# Patient Record
Sex: MALE | Race: BLACK OR AFRICAN AMERICAN | Employment: FULL TIME | URBAN - METROPOLITAN AREA
[De-identification: names, ages, dates, MRNs, and addresses within clinical notes are randomized per-mention and may not be internally consistent; named-entity substitution may affect disease eponyms.]

---

## 2018-06-10 ENCOUNTER — APPOINTMENT (OUTPATIENT)
Dept: CT IMAGING | Age: 43
End: 2018-06-10
Attending: PHYSICIAN ASSISTANT
Payer: COMMERCIAL

## 2018-06-10 ENCOUNTER — HOSPITAL ENCOUNTER (EMERGENCY)
Age: 43
Discharge: HOME | End: 2018-06-10
Attending: EMERGENCY MEDICINE
Payer: COMMERCIAL

## 2018-06-10 VITALS
HEART RATE: 57 BPM | RESPIRATION RATE: 16 BRPM | WEIGHT: 257 LBS | DIASTOLIC BLOOD PRESSURE: 108 MMHG | SYSTOLIC BLOOD PRESSURE: 156 MMHG | OXYGEN SATURATION: 99 % | BODY MASS INDEX: 36.79 KG/M2 | HEIGHT: 70 IN | TEMPERATURE: 97.8 F

## 2018-06-10 DIAGNOSIS — R22.0 FACIAL SWELLING: Primary | ICD-10-CM

## 2018-06-10 PROCEDURE — 99283 EMERGENCY DEPT VISIT LOW MDM: CPT

## 2018-06-10 PROCEDURE — 70486 CT MAXILLOFACIAL W/O DYE: CPT

## 2018-06-10 NOTE — ED TRIAGE NOTES
Pt reports having left jaw swelling that began last night @ 2230. Wife gave pt Benadryl 25 mg PO last night with good results. Pt states he woke up this morning with increase in swelling but not as significant as last night. Pt denies dental pain or injury.

## 2018-06-10 NOTE — DISCHARGE INSTRUCTIONS
Salivary Gland Stone: Care Instructions  Your Care Instructions    Salivary glands make saliva, or spit. A salivary gland stone is a piece of hard material, usually calcium, that can form in any of the three main salivary glands in the mouth. Salivary gland stones are also called salivary duct stones. Stones form most often in the gland that releases saliva below the tongue. A stone can block saliva from flowing out of the gland. When saliva backs up behind the stone, it can make the gland swell. The gland swells while you are eating, and then the swelling goes down slowly afterward. The swelling and pain may be under the jaw or in the area in front of the ear, depending on which gland is affected. Your doctor will ask you about your symptoms. He or she may be able to see and feel the gland under your skin or in the floor of your mouth. You may get an imaging test, such as a CT scan or ultrasound. This will help your doctor know if you have a stone and not some other problem. Most stones come out into the mouth on their own. While the stone is in the gland, your doctor may have you take medicine for pain. There are also some things you can do at home to help move the stone. If the stone in your gland hasn't come out within a few weeks, your doctor will discuss treatment options with you. Follow-up care is a key part of your treatment and safety. Be sure to make and go to all appointments, and call your doctor if you are having problems. It's also a good idea to know your test results and keep a list of the medicines you take. How can you care for yourself at home? · Be safe with medicines. Read and follow all instructions on the label. ¨ If the doctor gave you a prescription medicine for pain, take it as prescribed. ¨ If you are not taking a prescription pain medicine, ask your doctor if you can take an over-the-counter medicine. · If your doctor prescribed antibiotics, take them as directed.  Do not stop taking them just because you feel better. You need to take the full course of antibiotics. · Use sugar-free gum or candies such as lemon drops, or suck on a lemon wedge. They increase saliva, which may help push the stone out. · Gently massage the affected gland to help move the stone. When should you call for help? Call your doctor now or seek immediate medical care if:  ? · You have symptoms of infection, such as:  ¨ Increased pain, swelling, warmth, or redness. ¨ Red streaks leading from the salivary gland. ¨ Pus draining from the area where the saliva comes out into the mouth. ¨ A fever. ? Watch closely for changes in your health, and be sure to contact your doctor if:  ? · You do not get better as expected. Where can you learn more? Go to http://rose mary-jennifer.info/. Enter S425 in the search box to learn more about \"Salivary Gland Stone: Care Instructions. \"  Current as of: May 12, 2017  Content Version: 11.4  © 1901-1912 [employer]. Care instructions adapted under license by [employer] (which disclaims liability or warranty for this information). If you have questions about a medical condition or this instruction, always ask your healthcare professional. [address] any warranty or liability for your use of this information.

## 2018-06-10 NOTE — ED NOTES
I have reviewed discharge instructions with the patient. The patient verbalized understanding. Pt ambulatory to exit upon discharge no acute distress noted. Education provided on the follow up care for hypertension.

## 2021-01-01 ENCOUNTER — APPOINTMENT (OUTPATIENT)
Dept: VASCULAR SURGERY | Age: 46
DRG: 870 | End: 2021-01-01
Attending: NURSE PRACTITIONER
Payer: COMMERCIAL

## 2021-01-01 ENCOUNTER — APPOINTMENT (OUTPATIENT)
Dept: GENERAL RADIOLOGY | Age: 46
DRG: 870 | End: 2021-01-01
Attending: ANESTHESIOLOGY
Payer: COMMERCIAL

## 2021-01-01 ENCOUNTER — APPOINTMENT (OUTPATIENT)
Dept: GENERAL RADIOLOGY | Age: 46
DRG: 870 | End: 2021-01-01
Attending: INTERNAL MEDICINE
Payer: COMMERCIAL

## 2021-01-01 ENCOUNTER — APPOINTMENT (OUTPATIENT)
Dept: GENERAL RADIOLOGY | Age: 46
DRG: 870 | End: 2021-01-01
Attending: STUDENT IN AN ORGANIZED HEALTH CARE EDUCATION/TRAINING PROGRAM
Payer: COMMERCIAL

## 2021-01-01 ENCOUNTER — APPOINTMENT (OUTPATIENT)
Dept: NON INVASIVE DIAGNOSTICS | Age: 46
DRG: 870 | End: 2021-01-01
Attending: HOSPITALIST
Payer: COMMERCIAL

## 2021-01-01 ENCOUNTER — APPOINTMENT (OUTPATIENT)
Dept: GENERAL RADIOLOGY | Age: 46
DRG: 870 | End: 2021-01-01
Attending: NURSE PRACTITIONER
Payer: COMMERCIAL

## 2021-01-01 ENCOUNTER — APPOINTMENT (OUTPATIENT)
Dept: GENERAL RADIOLOGY | Age: 46
DRG: 870 | End: 2021-01-01
Payer: COMMERCIAL

## 2021-01-01 ENCOUNTER — HOSPITAL ENCOUNTER (INPATIENT)
Age: 46
LOS: 23 days | DRG: 870 | End: 2021-09-06
Attending: STUDENT IN AN ORGANIZED HEALTH CARE EDUCATION/TRAINING PROGRAM | Admitting: HOSPITALIST
Payer: COMMERCIAL

## 2021-01-01 ENCOUNTER — PATIENT OUTREACH (OUTPATIENT)
Dept: CASE MANAGEMENT | Age: 46
End: 2021-01-01

## 2021-01-01 ENCOUNTER — HOSPITAL ENCOUNTER (EMERGENCY)
Age: 46
Discharge: HOME | DRG: 870 | End: 2021-08-12
Attending: EMERGENCY MEDICINE
Payer: COMMERCIAL

## 2021-01-01 ENCOUNTER — APPOINTMENT (OUTPATIENT)
Dept: CT IMAGING | Age: 46
DRG: 870 | End: 2021-01-01
Attending: STUDENT IN AN ORGANIZED HEALTH CARE EDUCATION/TRAINING PROGRAM
Payer: COMMERCIAL

## 2021-01-01 ENCOUNTER — APPOINTMENT (OUTPATIENT)
Dept: GENERAL RADIOLOGY | Age: 46
DRG: 870 | End: 2021-01-01
Attending: HOSPITALIST
Payer: COMMERCIAL

## 2021-01-01 VITALS
DIASTOLIC BLOOD PRESSURE: 72 MMHG | OXYGEN SATURATION: 96 % | TEMPERATURE: 97.3 F | SYSTOLIC BLOOD PRESSURE: 116 MMHG | HEART RATE: 94 BPM | RESPIRATION RATE: 28 BRPM

## 2021-01-01 VITALS
RESPIRATION RATE: 24 BRPM | SYSTOLIC BLOOD PRESSURE: 68 MMHG | OXYGEN SATURATION: 41 % | WEIGHT: 240.52 LBS | BODY MASS INDEX: 34.43 KG/M2 | DIASTOLIC BLOOD PRESSURE: 25 MMHG | HEIGHT: 70 IN | TEMPERATURE: 97.1 F

## 2021-01-01 DIAGNOSIS — J96.00 ACUTE RESPIRATORY FAILURE DUE TO COVID-19 (HCC): Primary | ICD-10-CM

## 2021-01-01 DIAGNOSIS — U07.1 PNEUMONIA DUE TO COVID-19 VIRUS: ICD-10-CM

## 2021-01-01 DIAGNOSIS — J06.9 ACUTE URI: ICD-10-CM

## 2021-01-01 DIAGNOSIS — U07.1 ACUTE RESPIRATORY FAILURE DUE TO COVID-19 (HCC): Primary | ICD-10-CM

## 2021-01-01 DIAGNOSIS — U07.1 COVID-19: Primary | ICD-10-CM

## 2021-01-01 DIAGNOSIS — R09.02 HYPOXIA: ICD-10-CM

## 2021-01-01 DIAGNOSIS — J12.82 PNEUMONIA DUE TO COVID-19 VIRUS: ICD-10-CM

## 2021-01-01 LAB
ALBUMIN SERPL-MCNC: 1.7 G/DL (ref 3.5–5)
ALBUMIN SERPL-MCNC: 2.2 G/DL (ref 3.5–5)
ALBUMIN SERPL-MCNC: 2.3 G/DL (ref 3.5–5)
ALBUMIN SERPL-MCNC: 2.4 G/DL (ref 3.5–5)
ALBUMIN SERPL-MCNC: 2.5 G/DL (ref 3.5–5)
ALBUMIN SERPL-MCNC: 2.5 G/DL (ref 3.5–5)
ALBUMIN SERPL-MCNC: 2.6 G/DL (ref 3.5–5)
ALBUMIN SERPL-MCNC: 2.8 G/DL (ref 3.5–5)
ALBUMIN SERPL-MCNC: 2.8 G/DL (ref 3.5–5)
ALBUMIN SERPL-MCNC: 2.9 G/DL (ref 3.5–5)
ALBUMIN SERPL-MCNC: 3.2 G/DL (ref 3.5–5)
ALBUMIN SERPL-MCNC: 3.3 G/DL (ref 3.5–5)
ALBUMIN SERPL-MCNC: 3.4 G/DL (ref 3.5–5)
ALBUMIN SERPL-MCNC: 3.5 G/DL (ref 3.5–5)
ALBUMIN SERPL-MCNC: 3.6 G/DL (ref 3.5–5)
ALBUMIN SERPL-MCNC: 3.6 G/DL (ref 3.5–5)
ALBUMIN SERPL-MCNC: 4 G/DL (ref 3.5–5)
ALBUMIN/GLOB SERPL: 0.6 {RATIO} (ref 1.1–2.2)
ALBUMIN/GLOB SERPL: 0.7 {RATIO} (ref 1.1–2.2)
ALBUMIN/GLOB SERPL: 0.8 {RATIO} (ref 1.1–2.2)
ALBUMIN/GLOB SERPL: 0.9 {RATIO} (ref 1.1–2.2)
ALBUMIN/GLOB SERPL: 0.9 {RATIO} (ref 1.1–2.2)
ALBUMIN/GLOB SERPL: 1 {RATIO} (ref 1.1–2.2)
ALBUMIN/GLOB SERPL: 1.1 {RATIO} (ref 1.1–2.2)
ALBUMIN/GLOB SERPL: 1.2 {RATIO} (ref 1.1–2.2)
ALBUMIN/GLOB SERPL: 1.2 {RATIO} (ref 1.1–2.2)
ALBUMIN/GLOB SERPL: 1.3 {RATIO} (ref 1.1–2.2)
ALP SERPL-CCNC: 102 U/L (ref 45–117)
ALP SERPL-CCNC: 112 U/L (ref 45–117)
ALP SERPL-CCNC: 117 U/L (ref 45–117)
ALP SERPL-CCNC: 126 U/L (ref 45–117)
ALP SERPL-CCNC: 136 U/L (ref 45–117)
ALP SERPL-CCNC: 151 U/L (ref 45–117)
ALP SERPL-CCNC: 66 U/L (ref 45–117)
ALP SERPL-CCNC: 68 U/L (ref 45–117)
ALP SERPL-CCNC: 69 U/L (ref 45–117)
ALP SERPL-CCNC: 70 U/L (ref 45–117)
ALP SERPL-CCNC: 75 U/L (ref 45–117)
ALP SERPL-CCNC: 76 U/L (ref 45–117)
ALP SERPL-CCNC: 77 U/L (ref 45–117)
ALP SERPL-CCNC: 78 U/L (ref 45–117)
ALP SERPL-CCNC: 80 U/L (ref 45–117)
ALP SERPL-CCNC: 80 U/L (ref 45–117)
ALP SERPL-CCNC: 81 U/L (ref 45–117)
ALP SERPL-CCNC: 82 U/L (ref 45–117)
ALP SERPL-CCNC: 84 U/L (ref 45–117)
ALP SERPL-CCNC: 88 U/L (ref 45–117)
ALT SERPL-CCNC: 101 U/L (ref 12–78)
ALT SERPL-CCNC: 101 U/L (ref 12–78)
ALT SERPL-CCNC: 107 U/L (ref 12–78)
ALT SERPL-CCNC: 109 U/L (ref 12–78)
ALT SERPL-CCNC: 124 U/L (ref 12–78)
ALT SERPL-CCNC: 125 U/L (ref 12–78)
ALT SERPL-CCNC: 128 U/L (ref 12–78)
ALT SERPL-CCNC: 132 U/L (ref 12–78)
ALT SERPL-CCNC: 133 U/L (ref 12–78)
ALT SERPL-CCNC: 139 U/L (ref 12–78)
ALT SERPL-CCNC: 142 U/L (ref 12–78)
ALT SERPL-CCNC: 160 U/L (ref 12–78)
ALT SERPL-CCNC: 234 U/L (ref 12–78)
ALT SERPL-CCNC: 58 U/L (ref 12–78)
ALT SERPL-CCNC: 60 U/L (ref 12–78)
ALT SERPL-CCNC: 67 U/L (ref 12–78)
ALT SERPL-CCNC: 72 U/L (ref 12–78)
ALT SERPL-CCNC: 74 U/L (ref 12–78)
ALT SERPL-CCNC: 75 U/L (ref 12–78)
ALT SERPL-CCNC: 77 U/L (ref 12–78)
ALT SERPL-CCNC: 83 U/L (ref 12–78)
ALT SERPL-CCNC: 99 U/L (ref 12–78)
ANION GAP SERPL CALC-SCNC: 10 MMOL/L (ref 5–15)
ANION GAP SERPL CALC-SCNC: 3 MMOL/L (ref 5–15)
ANION GAP SERPL CALC-SCNC: 4 MMOL/L (ref 5–15)
ANION GAP SERPL CALC-SCNC: 5 MMOL/L (ref 5–15)
ANION GAP SERPL CALC-SCNC: 6 MMOL/L (ref 5–15)
ANION GAP SERPL CALC-SCNC: 7 MMOL/L (ref 5–15)
ANION GAP SERPL CALC-SCNC: 8 MMOL/L (ref 5–15)
ANION GAP SERPL CALC-SCNC: ABNORMAL MMOL/L (ref 5–15)
APTT PPP: 28.5 SEC (ref 22.1–31)
ARTERIAL PATENCY WRIST A: ABNORMAL
ARTERIAL PATENCY WRIST A: ABNORMAL
ARTERIAL PATENCY WRIST A: NO
ARTERIAL PATENCY WRIST A: POSITIVE
ARTERIAL PATENCY WRIST A: YES
AST SERPL-CCNC: 107 U/L (ref 15–37)
AST SERPL-CCNC: 136 U/L (ref 15–37)
AST SERPL-CCNC: 170 U/L (ref 15–37)
AST SERPL-CCNC: 174 U/L (ref 15–37)
AST SERPL-CCNC: 18 U/L (ref 15–37)
AST SERPL-CCNC: 20 U/L (ref 15–37)
AST SERPL-CCNC: 20 U/L (ref 15–37)
AST SERPL-CCNC: 21 U/L (ref 15–37)
AST SERPL-CCNC: 23 U/L (ref 15–37)
AST SERPL-CCNC: 23 U/L (ref 15–37)
AST SERPL-CCNC: 242 U/L (ref 15–37)
AST SERPL-CCNC: 265 U/L (ref 15–37)
AST SERPL-CCNC: 27 U/L (ref 15–37)
AST SERPL-CCNC: 28 U/L (ref 15–37)
AST SERPL-CCNC: 306 U/L (ref 15–37)
AST SERPL-CCNC: 31 U/L (ref 15–37)
AST SERPL-CCNC: 31 U/L (ref 15–37)
AST SERPL-CCNC: 40 U/L (ref 15–37)
AST SERPL-CCNC: 57 U/L (ref 15–37)
AST SERPL-CCNC: 65 U/L (ref 15–37)
AST SERPL-CCNC: 76 U/L (ref 15–37)
AST SERPL-CCNC: 77 U/L (ref 15–37)
ATRIAL RATE: 104 BPM
BACTERIA SPEC CULT: NORMAL
BASE DEFICIT BLD-SCNC: 1 MMOL/L
BASE DEFICIT BLD-SCNC: 1.1 MMOL/L
BASE DEFICIT BLD-SCNC: 3.4 MMOL/L
BASE DEFICIT BLDA-SCNC: 0.3 MMOL/L
BASE DEFICIT BLDA-SCNC: 1.5 MMOL/L
BASE DEFICIT BLDA-SCNC: 1.5 MMOL/L
BASE DEFICIT BLDA-SCNC: 2.5 MMOL/L
BASE DEFICIT BLDA-SCNC: 2.5 MMOL/L
BASE DEFICIT BLDA-SCNC: 3.1 MMOL/L
BASE DEFICIT BLDA-SCNC: 3.5 MMOL/L
BASE DEFICIT BLDA-SCNC: 3.9 MMOL/L
BASE DEFICIT BLDA-SCNC: 9 MMOL/L
BASE EXCESS BLD CALC-SCNC: 1.2 MMOL/L
BASE EXCESS BLD CALC-SCNC: 1.2 MMOL/L
BASE EXCESS BLD CALC-SCNC: 1.8 MMOL/L
BASE EXCESS BLD CALC-SCNC: 2.1 MMOL/L
BASE EXCESS BLDA CALC-SCNC: 0 MMOL/L
BASE EXCESS BLDA CALC-SCNC: 0.2 MMOL/L
BASE EXCESS BLDA CALC-SCNC: 0.3 MMOL/L
BASE EXCESS BLDA CALC-SCNC: 0.6 MMOL/L
BASE EXCESS BLDA CALC-SCNC: 0.9 MMOL/L
BASE EXCESS BLDA CALC-SCNC: 1.1 MMOL/L
BASE EXCESS BLDA CALC-SCNC: 2.3 MMOL/L
BASOPHILS # BLD: 0 K/UL (ref 0–0.1)
BASOPHILS NFR BLD: 0 % (ref 0–1)
BDY SITE: ABNORMAL
BDY SITE: NORMAL
BILIRUB SERPL-MCNC: 0.4 MG/DL (ref 0.2–1)
BILIRUB SERPL-MCNC: 0.5 MG/DL (ref 0.2–1)
BILIRUB SERPL-MCNC: 0.6 MG/DL (ref 0.2–1)
BILIRUB SERPL-MCNC: 0.6 MG/DL (ref 0.2–1)
BILIRUB SERPL-MCNC: 0.7 MG/DL (ref 0.2–1)
BILIRUB SERPL-MCNC: 0.7 MG/DL (ref 0.2–1)
BILIRUB SERPL-MCNC: 0.8 MG/DL (ref 0.2–1)
BILIRUB SERPL-MCNC: 0.9 MG/DL (ref 0.2–1)
BILIRUB SERPL-MCNC: 0.9 MG/DL (ref 0.2–1)
BILIRUB SERPL-MCNC: 1 MG/DL (ref 0.2–1)
BILIRUB SERPL-MCNC: 1 MG/DL (ref 0.2–1)
BILIRUB SERPL-MCNC: 1.2 MG/DL (ref 0.2–1)
BNP SERPL-MCNC: 58 PG/ML
BODY TEMPERATURE: 37
BODY TEMPERATURE: 37
BUN SERPL-MCNC: 22 MG/DL (ref 6–20)
BUN SERPL-MCNC: 23 MG/DL (ref 6–20)
BUN SERPL-MCNC: 23 MG/DL (ref 6–20)
BUN SERPL-MCNC: 24 MG/DL (ref 6–20)
BUN SERPL-MCNC: 24 MG/DL (ref 6–20)
BUN SERPL-MCNC: 25 MG/DL (ref 6–20)
BUN SERPL-MCNC: 26 MG/DL (ref 6–20)
BUN SERPL-MCNC: 27 MG/DL (ref 6–20)
BUN SERPL-MCNC: 27 MG/DL (ref 6–20)
BUN SERPL-MCNC: 29 MG/DL (ref 6–20)
BUN SERPL-MCNC: 29 MG/DL (ref 6–20)
BUN SERPL-MCNC: 31 MG/DL (ref 6–20)
BUN SERPL-MCNC: 31 MG/DL (ref 6–20)
BUN SERPL-MCNC: 38 MG/DL (ref 6–20)
BUN SERPL-MCNC: 42 MG/DL (ref 6–20)
BUN SERPL-MCNC: 47 MG/DL (ref 6–20)
BUN SERPL-MCNC: 51 MG/DL (ref 6–20)
BUN SERPL-MCNC: 54 MG/DL (ref 6–20)
BUN SERPL-MCNC: 61 MG/DL (ref 6–20)
BUN SERPL-MCNC: 62 MG/DL (ref 6–20)
BUN SERPL-MCNC: 65 MG/DL (ref 6–20)
BUN SERPL-MCNC: 73 MG/DL (ref 6–20)
BUN SERPL-MCNC: 73 MG/DL (ref 6–20)
BUN SERPL-MCNC: 80 MG/DL (ref 6–20)
BUN SERPL-MCNC: 82 MG/DL (ref 6–20)
BUN SERPL-MCNC: 83 MG/DL (ref 6–20)
BUN SERPL-MCNC: 84 MG/DL (ref 6–20)
BUN SERPL-MCNC: 85 MG/DL (ref 6–20)
BUN SERPL-MCNC: 85 MG/DL (ref 6–20)
BUN SERPL-MCNC: 88 MG/DL (ref 6–20)
BUN SERPL-MCNC: 89 MG/DL (ref 6–20)
BUN SERPL-MCNC: 92 MG/DL (ref 6–20)
BUN SERPL-MCNC: 97 MG/DL (ref 6–20)
BUN/CREAT SERPL: 12 (ref 12–20)
BUN/CREAT SERPL: 19 (ref 12–20)
BUN/CREAT SERPL: 19 (ref 12–20)
BUN/CREAT SERPL: 20 (ref 12–20)
BUN/CREAT SERPL: 21 (ref 12–20)
BUN/CREAT SERPL: 22 (ref 12–20)
BUN/CREAT SERPL: 22 (ref 12–20)
BUN/CREAT SERPL: 23 (ref 12–20)
BUN/CREAT SERPL: 24 (ref 12–20)
BUN/CREAT SERPL: 25 (ref 12–20)
BUN/CREAT SERPL: 25 (ref 12–20)
BUN/CREAT SERPL: 26 (ref 12–20)
BUN/CREAT SERPL: 26 (ref 12–20)
BUN/CREAT SERPL: 27 (ref 12–20)
BUN/CREAT SERPL: 29 (ref 12–20)
BUN/CREAT SERPL: 29 (ref 12–20)
BUN/CREAT SERPL: 30 (ref 12–20)
BUN/CREAT SERPL: 32 (ref 12–20)
BUN/CREAT SERPL: 35 (ref 12–20)
BUN/CREAT SERPL: 39 (ref 12–20)
BUN/CREAT SERPL: 41 (ref 12–20)
BUN/CREAT SERPL: 42 (ref 12–20)
BUN/CREAT SERPL: 42 (ref 12–20)
BUN/CREAT SERPL: 43 (ref 12–20)
BUN/CREAT SERPL: 43 (ref 12–20)
BUN/CREAT SERPL: 44 (ref 12–20)
BUN/CREAT SERPL: 44 (ref 12–20)
CA-I BLD-SCNC: 1.14 MMOL/L (ref 1.12–1.32)
CA-I BLD-SCNC: 1.22 MMOL/L (ref 1.13–1.32)
CA-I BLD-SCNC: 1.28 MMOL/L (ref 1.12–1.32)
CALCIUM SERPL-MCNC: 6.4 MG/DL (ref 8.5–10.1)
CALCIUM SERPL-MCNC: 7.4 MG/DL (ref 8.5–10.1)
CALCIUM SERPL-MCNC: 7.8 MG/DL (ref 8.5–10.1)
CALCIUM SERPL-MCNC: 8 MG/DL (ref 8.5–10.1)
CALCIUM SERPL-MCNC: 8.1 MG/DL (ref 8.5–10.1)
CALCIUM SERPL-MCNC: 8.2 MG/DL (ref 8.5–10.1)
CALCIUM SERPL-MCNC: 8.2 MG/DL (ref 8.5–10.1)
CALCIUM SERPL-MCNC: 8.3 MG/DL (ref 8.5–10.1)
CALCIUM SERPL-MCNC: 8.4 MG/DL (ref 8.5–10.1)
CALCIUM SERPL-MCNC: 8.5 MG/DL (ref 8.5–10.1)
CALCIUM SERPL-MCNC: 8.5 MG/DL (ref 8.5–10.1)
CALCIUM SERPL-MCNC: 8.6 MG/DL (ref 8.5–10.1)
CALCIUM SERPL-MCNC: 8.7 MG/DL (ref 8.5–10.1)
CALCIUM SERPL-MCNC: 8.8 MG/DL (ref 8.5–10.1)
CALCIUM SERPL-MCNC: 8.8 MG/DL (ref 8.5–10.1)
CALCIUM SERPL-MCNC: 9.1 MG/DL (ref 8.5–10.1)
CALCULATED P AXIS, ECG09: 21 DEGREES
CALCULATED R AXIS, ECG10: -33 DEGREES
CALCULATED T AXIS, ECG11: 28 DEGREES
CHLORIDE SERPL-SCNC: 101 MMOL/L (ref 97–108)
CHLORIDE SERPL-SCNC: 102 MMOL/L (ref 97–108)
CHLORIDE SERPL-SCNC: 104 MMOL/L (ref 97–108)
CHLORIDE SERPL-SCNC: 105 MMOL/L (ref 97–108)
CHLORIDE SERPL-SCNC: 105 MMOL/L (ref 97–108)
CHLORIDE SERPL-SCNC: 106 MMOL/L (ref 97–108)
CHLORIDE SERPL-SCNC: 107 MMOL/L (ref 97–108)
CHLORIDE SERPL-SCNC: 107 MMOL/L (ref 97–108)
CHLORIDE SERPL-SCNC: 108 MMOL/L (ref 97–108)
CHLORIDE SERPL-SCNC: 108 MMOL/L (ref 97–108)
CHLORIDE SERPL-SCNC: 109 MMOL/L (ref 97–108)
CHLORIDE SERPL-SCNC: 110 MMOL/L (ref 97–108)
CHLORIDE SERPL-SCNC: 111 MMOL/L (ref 97–108)
CHLORIDE SERPL-SCNC: 112 MMOL/L (ref 97–108)
CHLORIDE SERPL-SCNC: 112 MMOL/L (ref 97–108)
CHLORIDE SERPL-SCNC: 115 MMOL/L (ref 97–108)
CHLORIDE SERPL-SCNC: 99 MMOL/L (ref 97–108)
CHLORIDE SERPL-SCNC: 99 MMOL/L (ref 97–108)
CHLORIDE UR-SCNC: 55 MMOL/L
CK SERPL-CCNC: 187 U/L (ref 39–308)
CO2 SERPL-SCNC: 20 MMOL/L (ref 21–32)
CO2 SERPL-SCNC: 20 MMOL/L (ref 21–32)
CO2 SERPL-SCNC: 21 MMOL/L (ref 21–32)
CO2 SERPL-SCNC: 22 MMOL/L (ref 21–32)
CO2 SERPL-SCNC: 22 MMOL/L (ref 21–32)
CO2 SERPL-SCNC: 23 MMOL/L (ref 21–32)
CO2 SERPL-SCNC: 24 MMOL/L (ref 21–32)
CO2 SERPL-SCNC: 24 MMOL/L (ref 21–32)
CO2 SERPL-SCNC: 25 MMOL/L (ref 21–32)
CO2 SERPL-SCNC: 25 MMOL/L (ref 21–32)
CO2 SERPL-SCNC: 26 MMOL/L (ref 21–32)
CO2 SERPL-SCNC: 27 MMOL/L (ref 21–32)
CO2 SERPL-SCNC: 28 MMOL/L (ref 21–32)
CO2 SERPL-SCNC: 28 MMOL/L (ref 21–32)
CO2 SERPL-SCNC: 29 MMOL/L (ref 21–32)
CO2 SERPL-SCNC: 31 MMOL/L (ref 21–32)
COHGB MFR BLD: 0.3 % (ref 1–2)
COHGB MFR BLD: 0.4 % (ref 1–2)
COHGB MFR BLD: 0.5 % (ref 1–2)
COHGB MFR BLD: 0.8 % (ref 1–2)
COHGB MFR BLD: 0.8 % (ref 1–2)
COMMENT, HOLDF: NORMAL
COVID-19 RAPID TEST, COVR: DETECTED
CREAT SERPL-MCNC: 0.74 MG/DL (ref 0.7–1.3)
CREAT SERPL-MCNC: 0.89 MG/DL (ref 0.7–1.3)
CREAT SERPL-MCNC: 0.94 MG/DL (ref 0.7–1.3)
CREAT SERPL-MCNC: 0.98 MG/DL (ref 0.7–1.3)
CREAT SERPL-MCNC: 0.99 MG/DL (ref 0.7–1.3)
CREAT SERPL-MCNC: 0.99 MG/DL (ref 0.7–1.3)
CREAT SERPL-MCNC: 1.02 MG/DL (ref 0.7–1.3)
CREAT SERPL-MCNC: 1.04 MG/DL (ref 0.7–1.3)
CREAT SERPL-MCNC: 1.08 MG/DL (ref 0.7–1.3)
CREAT SERPL-MCNC: 1.1 MG/DL (ref 0.7–1.3)
CREAT SERPL-MCNC: 1.13 MG/DL (ref 0.7–1.3)
CREAT SERPL-MCNC: 1.15 MG/DL (ref 0.7–1.3)
CREAT SERPL-MCNC: 1.16 MG/DL (ref 0.7–1.3)
CREAT SERPL-MCNC: 1.3 MG/DL (ref 0.7–1.3)
CREAT SERPL-MCNC: 1.8 MG/DL (ref 0.7–1.3)
CREAT SERPL-MCNC: 1.85 MG/DL (ref 0.7–1.3)
CREAT SERPL-MCNC: 1.89 MG/DL (ref 0.7–1.3)
CREAT SERPL-MCNC: 1.91 MG/DL (ref 0.7–1.3)
CREAT SERPL-MCNC: 1.94 MG/DL (ref 0.7–1.3)
CREAT SERPL-MCNC: 1.94 MG/DL (ref 0.7–1.3)
CREAT SERPL-MCNC: 2.01 MG/DL (ref 0.7–1.3)
CREAT SERPL-MCNC: 2.03 MG/DL (ref 0.7–1.3)
CREAT SERPL-MCNC: 2.07 MG/DL (ref 0.7–1.3)
CREAT SERPL-MCNC: 2.1 MG/DL (ref 0.7–1.3)
CREAT SERPL-MCNC: 2.16 MG/DL (ref 0.7–1.3)
CREAT SERPL-MCNC: 2.35 MG/DL (ref 0.7–1.3)
CREAT SERPL-MCNC: 2.41 MG/DL (ref 0.7–1.3)
CREAT SERPL-MCNC: 2.41 MG/DL (ref 0.7–1.3)
CREAT SERPL-MCNC: 2.52 MG/DL (ref 0.7–1.3)
CREAT SERPL-MCNC: 2.54 MG/DL (ref 0.7–1.3)
CREAT SERPL-MCNC: 2.56 MG/DL (ref 0.7–1.3)
CREAT SERPL-MCNC: 2.6 MG/DL (ref 0.7–1.3)
CREAT SERPL-MCNC: 2.64 MG/DL (ref 0.7–1.3)
CREAT SERPL-MCNC: 2.67 MG/DL (ref 0.7–1.3)
CREAT SERPL-MCNC: 4.3 MG/DL (ref 0.7–1.3)
CREAT UR-MCNC: 83.3 MG/DL
CRP SERPL-MCNC: 0.34 MG/DL (ref 0–0.6)
CRP SERPL-MCNC: 0.59 MG/DL (ref 0–0.6)
CRP SERPL-MCNC: 1.02 MG/DL (ref 0–0.6)
CRP SERPL-MCNC: 1.39 MG/DL (ref 0–0.6)
CRP SERPL-MCNC: 10.3 MG/DL (ref 0–0.6)
CRP SERPL-MCNC: 4.1 MG/DL (ref 0–0.6)
CRP SERPL-MCNC: 8.3 MG/DL (ref 0–0.6)
CRP SERPL-MCNC: <0.29 MG/DL (ref 0–0.6)
D DIMER PPP FEU-MCNC: 0.36 MG/L FEU (ref 0–0.65)
D DIMER PPP FEU-MCNC: 0.5 MG/L FEU (ref 0–0.65)
D DIMER PPP FEU-MCNC: 0.53 MG/L FEU (ref 0–0.65)
D DIMER PPP FEU-MCNC: 0.57 MG/L FEU (ref 0–0.65)
D DIMER PPP FEU-MCNC: 11.14 MG/L FEU (ref 0–0.65)
D DIMER PPP FEU-MCNC: 14.3 MG/L FEU (ref 0–0.65)
D DIMER PPP FEU-MCNC: 3.96 MG/L FEU (ref 0–0.65)
D DIMER PPP FEU-MCNC: 4.67 MG/L FEU (ref 0–0.65)
D DIMER PPP FEU-MCNC: 8.53 MG/L FEU (ref 0–0.65)
DIAGNOSIS, 93000: NORMAL
DIFFERENTIAL METHOD BLD: ABNORMAL
ECHO AO ROOT DIAM: 2.58 CM
ECHO AV AREA PEAK VELOCITY: 3.46 CM2
ECHO AV AREA/BSA PEAK VELOCITY: 1.6 CM2/M2
ECHO AV PEAK GRADIENT: 11.14 MMHG
ECHO AV PEAK VELOCITY: 166.9 CM/S
ECHO LA AREA 4C: 18.1 CM2
ECHO LA MAJOR AXIS: 4.58 CM
ECHO LA MINOR AXIS: 2.05 CM
ECHO LA VOL 2C: 50.06 ML (ref 18–58)
ECHO LA VOL 4C: 43.9 ML (ref 18–58)
ECHO LA VOL BP: 51.88 ML (ref 18–58)
ECHO LA VOL/BSA BIPLANE: 23.26 ML/M2 (ref 16–28)
ECHO LA VOLUME INDEX A2C: 22.45 ML/M2 (ref 16–28)
ECHO LA VOLUME INDEX A4C: 19.69 ML/M2 (ref 16–28)
ECHO LV INTERNAL DIMENSION DIASTOLIC: 4.42 CM (ref 4.2–5.9)
ECHO LV INTERNAL DIMENSION SYSTOLIC: 3.03 CM
ECHO LV IVSD: 1.34 CM (ref 0.6–1)
ECHO LV MASS 2D: 230.3 G (ref 88–224)
ECHO LV MASS INDEX 2D: 103.3 G/M2 (ref 49–115)
ECHO LV POSTERIOR WALL DIASTOLIC: 1.37 CM (ref 0.6–1)
ECHO LVOT DIAM: 2.15 CM
ECHO LVOT PEAK GRADIENT: 10.2 MMHG
ECHO LVOT PEAK VELOCITY: 159.65 CM/S
ECHO PV MAX VELOCITY: 134.56 CM/S
ECHO PV PEAK INSTANTANEOUS GRADIENT SYSTOLIC: 7.24 MMHG
ECHO PV REGURGITANT MAX VELOCITY: 139.09 CM/S
ECHO RV TAPSE: 2.88 CM (ref 1.5–2)
EOSINOPHIL # BLD: 0 K/UL (ref 0–0.4)
EOSINOPHIL #/AREA URNS HPF: NEGATIVE /[HPF]
EOSINOPHIL NFR BLD: 0 % (ref 0–7)
ERYTHROCYTE [DISTWIDTH] IN BLOOD BY AUTOMATED COUNT: 12.7 % (ref 11.5–14.5)
ERYTHROCYTE [DISTWIDTH] IN BLOOD BY AUTOMATED COUNT: 12.7 % (ref 11.5–14.5)
ERYTHROCYTE [DISTWIDTH] IN BLOOD BY AUTOMATED COUNT: 12.8 % (ref 11.5–14.5)
ERYTHROCYTE [DISTWIDTH] IN BLOOD BY AUTOMATED COUNT: 13 % (ref 11.5–14.5)
ERYTHROCYTE [DISTWIDTH] IN BLOOD BY AUTOMATED COUNT: 13.2 % (ref 11.5–14.5)
ERYTHROCYTE [DISTWIDTH] IN BLOOD BY AUTOMATED COUNT: 13.3 % (ref 11.5–14.5)
ERYTHROCYTE [DISTWIDTH] IN BLOOD BY AUTOMATED COUNT: 13.5 % (ref 11.5–14.5)
ERYTHROCYTE [DISTWIDTH] IN BLOOD BY AUTOMATED COUNT: 13.7 % (ref 11.5–14.5)
ERYTHROCYTE [DISTWIDTH] IN BLOOD BY AUTOMATED COUNT: 13.7 % (ref 11.5–14.5)
ERYTHROCYTE [DISTWIDTH] IN BLOOD BY AUTOMATED COUNT: 13.9 % (ref 11.5–14.5)
ERYTHROCYTE [DISTWIDTH] IN BLOOD BY AUTOMATED COUNT: 14.1 % (ref 11.5–14.5)
ERYTHROCYTE [DISTWIDTH] IN BLOOD BY AUTOMATED COUNT: 14.1 % (ref 11.5–14.5)
ERYTHROCYTE [DISTWIDTH] IN BLOOD BY AUTOMATED COUNT: 14.2 % (ref 11.5–14.5)
ERYTHROCYTE [DISTWIDTH] IN BLOOD BY AUTOMATED COUNT: 14.4 % (ref 11.5–14.5)
ERYTHROCYTE [DISTWIDTH] IN BLOOD BY AUTOMATED COUNT: 14.5 % (ref 11.5–14.5)
ERYTHROCYTE [DISTWIDTH] IN BLOOD BY AUTOMATED COUNT: 14.6 % (ref 11.5–14.5)
ERYTHROCYTE [DISTWIDTH] IN BLOOD BY AUTOMATED COUNT: 14.6 % (ref 11.5–14.5)
FERRITIN SERPL-MCNC: 2783 NG/ML (ref 26–388)
FIBRINOGEN PPP-MCNC: 434 MG/DL (ref 200–475)
FIBRINOGEN PPP-MCNC: 95 MG/DL (ref 200–475)
FIO2 ON VENT: 100 %
FLUID CULTURE, SPNG2: NORMAL
GAS FLOW.O2 O2 DELIVERY SYS: ABNORMAL L/MIN
GAS FLOW.O2 SETTING OXYMISER: 18 BPM
GAS FLOW.O2 SETTING OXYMISER: 20 L/MIN
GAS FLOW.O2 SETTING OXYMISER: 24 L/MIN
GAS FLOW.O2 SETTING OXYMISER: 24 L/MIN
GAS FLOW.O2 SETTING OXYMISER: 27 BPM
GAS FLOW.O2 SETTING OXYMISER: 28 L/MIN
GAS FLOW.O2 SETTING OXYMISER: 30 BPM
GAS FLOW.O2 SETTING OXYMISER: 30 L/MIN
GAS FLOW.O2 SETTING OXYMISER: 32 L/MIN
GLOBULIN SER CALC-MCNC: 2.6 G/DL (ref 2–4)
GLOBULIN SER CALC-MCNC: 2.9 G/DL (ref 2–4)
GLOBULIN SER CALC-MCNC: 3.1 G/DL (ref 2–4)
GLOBULIN SER CALC-MCNC: 3.2 G/DL (ref 2–4)
GLOBULIN SER CALC-MCNC: 3.3 G/DL (ref 2–4)
GLOBULIN SER CALC-MCNC: 3.6 G/DL (ref 2–4)
GLOBULIN SER CALC-MCNC: 3.6 G/DL (ref 2–4)
GLOBULIN SER CALC-MCNC: 3.8 G/DL (ref 2–4)
GLOBULIN SER CALC-MCNC: 3.8 G/DL (ref 2–4)
GLOBULIN SER CALC-MCNC: 4 G/DL (ref 2–4)
GLOBULIN SER CALC-MCNC: 4.1 G/DL (ref 2–4)
GLUCOSE BLD STRIP.AUTO-MCNC: 127 MG/DL (ref 65–117)
GLUCOSE BLD STRIP.AUTO-MCNC: 128 MG/DL (ref 65–117)
GLUCOSE BLD STRIP.AUTO-MCNC: 130 MG/DL (ref 65–117)
GLUCOSE BLD STRIP.AUTO-MCNC: 131 MG/DL (ref 65–117)
GLUCOSE BLD STRIP.AUTO-MCNC: 132 MG/DL (ref 65–117)
GLUCOSE BLD STRIP.AUTO-MCNC: 135 MG/DL (ref 65–117)
GLUCOSE BLD STRIP.AUTO-MCNC: 136 MG/DL (ref 65–117)
GLUCOSE BLD STRIP.AUTO-MCNC: 139 MG/DL (ref 65–117)
GLUCOSE BLD STRIP.AUTO-MCNC: 140 MG/DL (ref 65–117)
GLUCOSE BLD STRIP.AUTO-MCNC: 143 MG/DL (ref 65–117)
GLUCOSE BLD STRIP.AUTO-MCNC: 147 MG/DL (ref 65–117)
GLUCOSE BLD STRIP.AUTO-MCNC: 148 MG/DL (ref 65–117)
GLUCOSE BLD STRIP.AUTO-MCNC: 149 MG/DL (ref 65–117)
GLUCOSE BLD STRIP.AUTO-MCNC: 149 MG/DL (ref 65–117)
GLUCOSE BLD STRIP.AUTO-MCNC: 152 MG/DL (ref 65–117)
GLUCOSE BLD STRIP.AUTO-MCNC: 155 MG/DL (ref 65–117)
GLUCOSE BLD STRIP.AUTO-MCNC: 156 MG/DL (ref 65–117)
GLUCOSE BLD STRIP.AUTO-MCNC: 156 MG/DL (ref 65–117)
GLUCOSE BLD STRIP.AUTO-MCNC: 157 MG/DL (ref 65–117)
GLUCOSE BLD STRIP.AUTO-MCNC: 158 MG/DL (ref 65–117)
GLUCOSE BLD STRIP.AUTO-MCNC: 158 MG/DL (ref 65–117)
GLUCOSE BLD STRIP.AUTO-MCNC: 159 MG/DL (ref 65–117)
GLUCOSE BLD STRIP.AUTO-MCNC: 165 MG/DL (ref 65–117)
GLUCOSE BLD STRIP.AUTO-MCNC: 166 MG/DL (ref 65–117)
GLUCOSE BLD STRIP.AUTO-MCNC: 167 MG/DL (ref 65–117)
GLUCOSE BLD STRIP.AUTO-MCNC: 168 MG/DL (ref 65–117)
GLUCOSE BLD STRIP.AUTO-MCNC: 170 MG/DL (ref 65–117)
GLUCOSE BLD STRIP.AUTO-MCNC: 171 MG/DL (ref 65–117)
GLUCOSE BLD STRIP.AUTO-MCNC: 173 MG/DL (ref 65–117)
GLUCOSE BLD STRIP.AUTO-MCNC: 174 MG/DL (ref 65–117)
GLUCOSE BLD STRIP.AUTO-MCNC: 177 MG/DL (ref 65–117)
GLUCOSE BLD STRIP.AUTO-MCNC: 178 MG/DL (ref 65–117)
GLUCOSE BLD STRIP.AUTO-MCNC: 179 MG/DL (ref 65–117)
GLUCOSE BLD STRIP.AUTO-MCNC: 179 MG/DL (ref 65–117)
GLUCOSE BLD STRIP.AUTO-MCNC: 180 MG/DL (ref 65–117)
GLUCOSE BLD STRIP.AUTO-MCNC: 180 MG/DL (ref 65–117)
GLUCOSE BLD STRIP.AUTO-MCNC: 181 MG/DL (ref 65–117)
GLUCOSE BLD STRIP.AUTO-MCNC: 181 MG/DL (ref 65–117)
GLUCOSE BLD STRIP.AUTO-MCNC: 182 MG/DL (ref 65–117)
GLUCOSE BLD STRIP.AUTO-MCNC: 185 MG/DL (ref 65–117)
GLUCOSE BLD STRIP.AUTO-MCNC: 186 MG/DL (ref 65–117)
GLUCOSE BLD STRIP.AUTO-MCNC: 188 MG/DL (ref 65–117)
GLUCOSE BLD STRIP.AUTO-MCNC: 190 MG/DL (ref 65–117)
GLUCOSE BLD STRIP.AUTO-MCNC: 190 MG/DL (ref 65–117)
GLUCOSE BLD STRIP.AUTO-MCNC: 191 MG/DL (ref 65–117)
GLUCOSE BLD STRIP.AUTO-MCNC: 191 MG/DL (ref 65–117)
GLUCOSE BLD STRIP.AUTO-MCNC: 192 MG/DL (ref 65–117)
GLUCOSE BLD STRIP.AUTO-MCNC: 193 MG/DL (ref 65–117)
GLUCOSE BLD STRIP.AUTO-MCNC: 201 MG/DL (ref 65–117)
GLUCOSE BLD STRIP.AUTO-MCNC: 201 MG/DL (ref 65–117)
GLUCOSE BLD STRIP.AUTO-MCNC: 202 MG/DL (ref 65–117)
GLUCOSE BLD STRIP.AUTO-MCNC: 204 MG/DL (ref 65–117)
GLUCOSE BLD STRIP.AUTO-MCNC: 204 MG/DL (ref 65–117)
GLUCOSE BLD STRIP.AUTO-MCNC: 209 MG/DL (ref 65–117)
GLUCOSE BLD STRIP.AUTO-MCNC: 211 MG/DL (ref 65–117)
GLUCOSE BLD STRIP.AUTO-MCNC: 212 MG/DL (ref 65–117)
GLUCOSE BLD STRIP.AUTO-MCNC: 214 MG/DL (ref 65–117)
GLUCOSE BLD STRIP.AUTO-MCNC: 216 MG/DL (ref 65–117)
GLUCOSE BLD STRIP.AUTO-MCNC: 217 MG/DL (ref 65–117)
GLUCOSE BLD STRIP.AUTO-MCNC: 220 MG/DL (ref 65–117)
GLUCOSE BLD STRIP.AUTO-MCNC: 224 MG/DL (ref 65–117)
GLUCOSE BLD STRIP.AUTO-MCNC: 237 MG/DL (ref 65–117)
GLUCOSE BLD STRIP.AUTO-MCNC: 250 MG/DL (ref 65–117)
GLUCOSE BLD STRIP.AUTO-MCNC: 283 MG/DL (ref 65–117)
GLUCOSE SERPL-MCNC: 106 MG/DL (ref 65–100)
GLUCOSE SERPL-MCNC: 110 MG/DL (ref 65–100)
GLUCOSE SERPL-MCNC: 120 MG/DL (ref 65–100)
GLUCOSE SERPL-MCNC: 121 MG/DL (ref 65–100)
GLUCOSE SERPL-MCNC: 121 MG/DL (ref 65–100)
GLUCOSE SERPL-MCNC: 125 MG/DL (ref 65–100)
GLUCOSE SERPL-MCNC: 132 MG/DL (ref 65–100)
GLUCOSE SERPL-MCNC: 139 MG/DL (ref 65–100)
GLUCOSE SERPL-MCNC: 147 MG/DL (ref 65–100)
GLUCOSE SERPL-MCNC: 149 MG/DL (ref 65–100)
GLUCOSE SERPL-MCNC: 156 MG/DL (ref 65–100)
GLUCOSE SERPL-MCNC: 163 MG/DL (ref 65–100)
GLUCOSE SERPL-MCNC: 166 MG/DL (ref 65–100)
GLUCOSE SERPL-MCNC: 175 MG/DL (ref 65–100)
GLUCOSE SERPL-MCNC: 177 MG/DL (ref 65–100)
GLUCOSE SERPL-MCNC: 178 MG/DL (ref 65–100)
GLUCOSE SERPL-MCNC: 183 MG/DL (ref 65–100)
GLUCOSE SERPL-MCNC: 183 MG/DL (ref 65–100)
GLUCOSE SERPL-MCNC: 186 MG/DL (ref 65–100)
GLUCOSE SERPL-MCNC: 188 MG/DL (ref 65–100)
GLUCOSE SERPL-MCNC: 189 MG/DL (ref 65–100)
GLUCOSE SERPL-MCNC: 192 MG/DL (ref 65–100)
GLUCOSE SERPL-MCNC: 194 MG/DL (ref 65–100)
GLUCOSE SERPL-MCNC: 195 MG/DL (ref 65–100)
GLUCOSE SERPL-MCNC: 198 MG/DL (ref 65–100)
GLUCOSE SERPL-MCNC: 201 MG/DL (ref 65–100)
GLUCOSE SERPL-MCNC: 203 MG/DL (ref 65–100)
GLUCOSE SERPL-MCNC: 207 MG/DL (ref 65–100)
GLUCOSE SERPL-MCNC: 208 MG/DL (ref 65–100)
GLUCOSE SERPL-MCNC: 212 MG/DL (ref 65–100)
GLUCOSE SERPL-MCNC: 215 MG/DL (ref 65–100)
GLUCOSE SERPL-MCNC: 219 MG/DL (ref 65–100)
GLUCOSE SERPL-MCNC: 231 MG/DL (ref 65–100)
GLUCOSE SERPL-MCNC: 236 MG/DL (ref 65–100)
GLUCOSE SERPL-MCNC: 242 MG/DL (ref 65–100)
GRAM STN SPEC: NORMAL
HCO3 BLD-SCNC: 23.2 MMOL/L (ref 22–26)
HCO3 BLD-SCNC: 24.4 MMOL/L (ref 22–26)
HCO3 BLD-SCNC: 25.9 MMOL/L (ref 22–26)
HCO3 BLD-SCNC: 25.9 MMOL/L (ref 22–26)
HCO3 BLD-SCNC: 27.9 MMOL/L (ref 22–26)
HCO3 BLD-SCNC: 28.4 MMOL/L (ref 22–26)
HCO3 BLD-SCNC: 29.1 MMOL/L (ref 22–26)
HCO3 BLDA-SCNC: 22 MMOL/L (ref 22–26)
HCO3 BLDA-SCNC: 23 MMOL/L (ref 22–26)
HCO3 BLDA-SCNC: 24 MMOL/L (ref 22–26)
HCO3 BLDA-SCNC: 25 MMOL/L (ref 22–26)
HCO3 BLDA-SCNC: 25 MMOL/L (ref 22–26)
HCO3 BLDA-SCNC: 26 MMOL/L (ref 22–26)
HCO3 BLDA-SCNC: 26 MMOL/L (ref 22–26)
HCO3 BLDA-SCNC: 27 MMOL/L (ref 22–26)
HCO3 BLDA-SCNC: 27 MMOL/L (ref 22–26)
HCO3 BLDA-SCNC: 28 MMOL/L (ref 22–26)
HCO3 BLDA-SCNC: 28 MMOL/L (ref 22–26)
HCO3 BLDA-SCNC: 29 MMOL/L (ref 22–26)
HCT VFR BLD AUTO: 29.8 % (ref 36.6–50.3)
HCT VFR BLD AUTO: 35.3 % (ref 36.6–50.3)
HCT VFR BLD AUTO: 35.7 % (ref 36.6–50.3)
HCT VFR BLD AUTO: 35.9 % (ref 36.6–50.3)
HCT VFR BLD AUTO: 36.2 % (ref 36.6–50.3)
HCT VFR BLD AUTO: 37.1 % (ref 36.6–50.3)
HCT VFR BLD AUTO: 39.5 % (ref 36.6–50.3)
HCT VFR BLD AUTO: 40.1 % (ref 36.6–50.3)
HCT VFR BLD AUTO: 41.8 % (ref 36.6–50.3)
HCT VFR BLD AUTO: 42.8 % (ref 36.6–50.3)
HCT VFR BLD AUTO: 45.7 % (ref 36.6–50.3)
HCT VFR BLD AUTO: 45.7 % (ref 36.6–50.3)
HCT VFR BLD AUTO: 46.5 % (ref 36.6–50.3)
HCT VFR BLD AUTO: 46.9 % (ref 36.6–50.3)
HCT VFR BLD AUTO: 47.2 % (ref 36.6–50.3)
HCT VFR BLD AUTO: 47.3 % (ref 36.6–50.3)
HCT VFR BLD AUTO: 47.3 % (ref 36.6–50.3)
HCT VFR BLD AUTO: 48.1 % (ref 36.6–50.3)
HCT VFR BLD AUTO: 48.8 % (ref 36.6–50.3)
HCT VFR BLD AUTO: 49.2 % (ref 36.6–50.3)
HCT VFR BLD AUTO: 51 % (ref 36.6–50.3)
HGB BLD OXIMETRY-MCNC: 13.8 G/DL (ref 14–17)
HGB BLD OXIMETRY-MCNC: 13.8 G/DL (ref 14–17)
HGB BLD OXIMETRY-MCNC: 14.3 G/DL (ref 14–17)
HGB BLD OXIMETRY-MCNC: 14.4 G/DL (ref 14–17)
HGB BLD OXIMETRY-MCNC: 15.8 G/DL (ref 14–17)
HGB BLD-MCNC: 11.5 G/DL (ref 12.1–17)
HGB BLD-MCNC: 11.5 G/DL (ref 12.1–17)
HGB BLD-MCNC: 11.6 G/DL (ref 12.1–17)
HGB BLD-MCNC: 11.7 G/DL (ref 12.1–17)
HGB BLD-MCNC: 12.1 G/DL (ref 12.1–17)
HGB BLD-MCNC: 12.8 G/DL (ref 12.1–17)
HGB BLD-MCNC: 12.9 G/DL (ref 12.1–17)
HGB BLD-MCNC: 13.2 G/DL (ref 12.1–17)
HGB BLD-MCNC: 13.4 G/DL (ref 12.1–17)
HGB BLD-MCNC: 15.2 G/DL (ref 12.1–17)
HGB BLD-MCNC: 15.3 G/DL (ref 12.1–17)
HGB BLD-MCNC: 15.4 G/DL (ref 12.1–17)
HGB BLD-MCNC: 15.4 G/DL (ref 12.1–17)
HGB BLD-MCNC: 15.5 G/DL (ref 12.1–17)
HGB BLD-MCNC: 15.6 G/DL (ref 12.1–17)
HGB BLD-MCNC: 15.6 G/DL (ref 12.1–17)
HGB BLD-MCNC: 16.1 G/DL (ref 12.1–17)
HGB BLD-MCNC: 16.2 G/DL (ref 12.1–17)
HGB BLD-MCNC: 16.4 G/DL (ref 12.1–17)
HGB BLD-MCNC: 16.8 G/DL (ref 12.1–17)
HGB BLD-MCNC: 9.7 G/DL (ref 12.1–17)
IMM GRANULOCYTES # BLD AUTO: 0 K/UL (ref 0–0.04)
IMM GRANULOCYTES # BLD AUTO: 0 K/UL (ref 0–0.04)
IMM GRANULOCYTES # BLD AUTO: 0.1 K/UL (ref 0–0.04)
IMM GRANULOCYTES NFR BLD AUTO: 0 % (ref 0–0.5)
IMM GRANULOCYTES NFR BLD AUTO: 1 % (ref 0–0.5)
IMM GRANULOCYTES NFR BLD AUTO: 2 % (ref 0–0.5)
INR PPP: 1.1 (ref 0.9–1.1)
INSPIRATION.DURATION SETTING TIME VENT: 1.12 SEC
INSPIRATION.DURATION SETTING TIME VENT: 1.33 SEC
IPAP/PIP, IPAPIP: 16
IPAP/PIP, IPAPIP: 28
L PNEUMO1 AG UR QL IA: NEGATIVE
LACTATE SERPL-SCNC: 1.1 MMOL/L (ref 0.4–2)
LACTATE SERPL-SCNC: 1.2 MMOL/L (ref 0.4–2)
LACTATE SERPL-SCNC: 1.2 MMOL/L (ref 0.4–2)
LACTATE SERPL-SCNC: 1.6 MMOL/L (ref 0.4–2)
LACTATE SERPL-SCNC: 1.7 MMOL/L (ref 0.4–2)
LACTATE SERPL-SCNC: 2 MMOL/L (ref 0.4–2)
LACTATE SERPL-SCNC: 2.1 MMOL/L (ref 0.4–2)
LACTATE SERPL-SCNC: 2.2 MMOL/L (ref 0.4–2)
LACTATE SERPL-SCNC: 2.2 MMOL/L (ref 0.4–2)
LACTATE SERPL-SCNC: 2.3 MMOL/L (ref 0.4–2)
LACTATE SERPL-SCNC: 2.5 MMOL/L (ref 0.4–2)
LACTATE SERPL-SCNC: 2.6 MMOL/L (ref 0.4–2)
LACTATE SERPL-SCNC: 2.7 MMOL/L (ref 0.4–2)
LACTATE SERPL-SCNC: 3.1 MMOL/L (ref 0.4–2)
LACTATE SERPL-SCNC: NORMAL MMOL/L (ref 0.4–2)
LDH SERPL L TO P-CCNC: 1248 U/L (ref 85–241)
LDH SERPL L TO P-CCNC: 1386 U/L (ref 85–241)
LYMPHOCYTES # BLD: 0.8 K/UL (ref 0.8–3.5)
LYMPHOCYTES # BLD: 0.8 K/UL (ref 0.8–3.5)
LYMPHOCYTES # BLD: 1 K/UL (ref 0.8–3.5)
LYMPHOCYTES # BLD: 1 K/UL (ref 0.8–3.5)
LYMPHOCYTES # BLD: 1.4 K/UL (ref 0.8–3.5)
LYMPHOCYTES # BLD: 1.6 K/UL (ref 0.8–3.5)
LYMPHOCYTES NFR BLD: 10 % (ref 12–49)
LYMPHOCYTES NFR BLD: 20 % (ref 12–49)
LYMPHOCYTES NFR BLD: 24 % (ref 12–49)
LYMPHOCYTES NFR BLD: 25 % (ref 12–49)
LYMPHOCYTES NFR BLD: 26 % (ref 12–49)
LYMPHOCYTES NFR BLD: 31 % (ref 12–49)
MAGNESIUM SERPL-MCNC: 1.8 MG/DL (ref 1.6–2.4)
MAGNESIUM SERPL-MCNC: 2.2 MG/DL (ref 1.6–2.4)
MAGNESIUM SERPL-MCNC: 2.2 MG/DL (ref 1.6–2.4)
MAGNESIUM SERPL-MCNC: 2.3 MG/DL (ref 1.6–2.4)
MAGNESIUM SERPL-MCNC: 2.3 MG/DL (ref 1.6–2.4)
MAGNESIUM SERPL-MCNC: 2.4 MG/DL (ref 1.6–2.4)
MAGNESIUM SERPL-MCNC: 2.5 MG/DL (ref 1.6–2.4)
MAGNESIUM SERPL-MCNC: 2.6 MG/DL (ref 1.6–2.4)
MAGNESIUM SERPL-MCNC: 2.7 MG/DL (ref 1.6–2.4)
MAGNESIUM SERPL-MCNC: 2.8 MG/DL (ref 1.6–2.4)
MAGNESIUM SERPL-MCNC: 2.8 MG/DL (ref 1.6–2.4)
MAGNESIUM SERPL-MCNC: 2.9 MG/DL (ref 1.6–2.4)
MAGNESIUM SERPL-MCNC: 3.1 MG/DL (ref 1.6–2.4)
MCH RBC QN AUTO: 29 PG (ref 26–34)
MCH RBC QN AUTO: 29.2 PG (ref 26–34)
MCH RBC QN AUTO: 29.3 PG (ref 26–34)
MCH RBC QN AUTO: 29.4 PG (ref 26–34)
MCH RBC QN AUTO: 29.4 PG (ref 26–34)
MCH RBC QN AUTO: 29.5 PG (ref 26–34)
MCH RBC QN AUTO: 29.6 PG (ref 26–34)
MCH RBC QN AUTO: 29.7 PG (ref 26–34)
MCH RBC QN AUTO: 29.8 PG (ref 26–34)
MCH RBC QN AUTO: 29.9 PG (ref 26–34)
MCH RBC QN AUTO: 29.9 PG (ref 26–34)
MCH RBC QN AUTO: 30 PG (ref 26–34)
MCH RBC QN AUTO: 30.1 PG (ref 26–34)
MCHC RBC AUTO-ENTMCNC: 31.1 G/DL (ref 30–36.5)
MCHC RBC AUTO-ENTMCNC: 31.3 G/DL (ref 30–36.5)
MCHC RBC AUTO-ENTMCNC: 31.6 G/DL (ref 30–36.5)
MCHC RBC AUTO-ENTMCNC: 31.9 G/DL (ref 30–36.5)
MCHC RBC AUTO-ENTMCNC: 32 G/DL (ref 30–36.5)
MCHC RBC AUTO-ENTMCNC: 32.3 G/DL (ref 30–36.5)
MCHC RBC AUTO-ENTMCNC: 32.5 G/DL (ref 30–36.5)
MCHC RBC AUTO-ENTMCNC: 32.6 G/DL (ref 30–36.5)
MCHC RBC AUTO-ENTMCNC: 32.7 G/DL (ref 30–36.5)
MCHC RBC AUTO-ENTMCNC: 32.8 G/DL (ref 30–36.5)
MCHC RBC AUTO-ENTMCNC: 32.9 G/DL (ref 30–36.5)
MCHC RBC AUTO-ENTMCNC: 32.9 G/DL (ref 30–36.5)
MCHC RBC AUTO-ENTMCNC: 33.1 G/DL (ref 30–36.5)
MCHC RBC AUTO-ENTMCNC: 33.5 G/DL (ref 30–36.5)
MCHC RBC AUTO-ENTMCNC: 33.7 G/DL (ref 30–36.5)
MCHC RBC AUTO-ENTMCNC: 35 G/DL (ref 30–36.5)
MCV RBC AUTO: 85.7 FL (ref 80–99)
MCV RBC AUTO: 87.9 FL (ref 80–99)
MCV RBC AUTO: 88.1 FL (ref 80–99)
MCV RBC AUTO: 88.1 FL (ref 80–99)
MCV RBC AUTO: 88.6 FL (ref 80–99)
MCV RBC AUTO: 89.1 FL (ref 80–99)
MCV RBC AUTO: 89.1 FL (ref 80–99)
MCV RBC AUTO: 89.6 FL (ref 80–99)
MCV RBC AUTO: 89.7 FL (ref 80–99)
MCV RBC AUTO: 89.8 FL (ref 80–99)
MCV RBC AUTO: 90.5 FL (ref 80–99)
MCV RBC AUTO: 90.8 FL (ref 80–99)
MCV RBC AUTO: 91.2 FL (ref 80–99)
MCV RBC AUTO: 91.3 FL (ref 80–99)
MCV RBC AUTO: 91.8 FL (ref 80–99)
MCV RBC AUTO: 91.9 FL (ref 80–99)
MCV RBC AUTO: 92.5 FL (ref 80–99)
MCV RBC AUTO: 93 FL (ref 80–99)
MCV RBC AUTO: 93.9 FL (ref 80–99)
MCV RBC AUTO: 94.8 FL (ref 80–99)
MCV RBC AUTO: 95.3 FL (ref 80–99)
METHGB MFR BLD: 0.1 % (ref 0–1.4)
METHGB MFR BLD: 0.5 % (ref 0–1.4)
METHGB MFR BLD: 0.5 % (ref 0–1.4)
METHGB MFR BLD: 0.6 % (ref 0–1.4)
METHGB MFR BLD: 0.6 % (ref 0–1.4)
MONOCYTES # BLD: 0.2 K/UL (ref 0–1)
MONOCYTES # BLD: 0.4 K/UL (ref 0–1)
MONOCYTES # BLD: 0.4 K/UL (ref 0–1)
MONOCYTES # BLD: 0.5 K/UL (ref 0–1)
MONOCYTES # BLD: 0.6 K/UL (ref 0–1)
MONOCYTES # BLD: 0.7 K/UL (ref 0–1)
MONOCYTES NFR BLD: 12 % (ref 5–13)
MONOCYTES NFR BLD: 12 % (ref 5–13)
MONOCYTES NFR BLD: 14 % (ref 5–13)
MONOCYTES NFR BLD: 5 % (ref 5–13)
MONOCYTES NFR BLD: 7 % (ref 5–13)
MONOCYTES NFR BLD: 7 % (ref 5–13)
NEUTS SEG # BLD: 1.9 K/UL (ref 1.8–8)
NEUTS SEG # BLD: 2.1 K/UL (ref 1.8–8)
NEUTS SEG # BLD: 2.4 K/UL (ref 1.8–8)
NEUTS SEG # BLD: 3.5 K/UL (ref 1.8–8)
NEUTS SEG # BLD: 5.6 K/UL (ref 1.8–8)
NEUTS SEG # BLD: 6.3 K/UL (ref 1.8–8)
NEUTS SEG NFR BLD: 56 % (ref 32–75)
NEUTS SEG NFR BLD: 60 % (ref 32–75)
NEUTS SEG NFR BLD: 62 % (ref 32–75)
NEUTS SEG NFR BLD: 67 % (ref 32–75)
NEUTS SEG NFR BLD: 72 % (ref 32–75)
NEUTS SEG NFR BLD: 84 % (ref 32–75)
NRBC # BLD: 0 K/UL (ref 0–0.01)
NRBC # BLD: 0.02 K/UL (ref 0–0.01)
NRBC BLD-RTO: 0 PER 100 WBC
NRBC BLD-RTO: 0.1 PER 100 WBC
O2/TOTAL GAS SETTING VFR VENT: 100 %
ORGANISM ID, SPNG3: NORMAL
OXYHGB MFR BLD: 88 % (ref 94–97)
OXYHGB MFR BLD: 97.3 % (ref 94–97)
OXYHGB MFR BLD: 98.1 % (ref 94–97)
OXYHGB MFR BLD: 98.2 % (ref 94–97)
OXYHGB MFR BLD: 98.3 % (ref 94–97)
P-R INTERVAL, ECG05: 154 MS
PCO2 BLD: 37.3 MMHG (ref 35–45)
PCO2 BLD: 37.9 MMHG (ref 35–45)
PCO2 BLD: 40.8 MMHG (ref 35–45)
PCO2 BLD: 42.4 MMHG (ref 35–45)
PCO2 BLD: 50.9 MMHG (ref 35–45)
PCO2 BLD: 51 MMHG (ref 35–45)
PCO2 BLD: 93.7 MMHG (ref 35–45)
PCO2 BLDA: 33 MMHG (ref 35–45)
PCO2 BLDA: 37 MMHG (ref 35–45)
PCO2 BLDA: 37 MMHG (ref 35–45)
PCO2 BLDA: 38 MMHG (ref 35–45)
PCO2 BLDA: 38 MMHG (ref 35–45)
PCO2 BLDA: 39 MMHG (ref 35–45)
PCO2 BLDA: 41 MMHG (ref 35–45)
PCO2 BLDA: 49 MMHG (ref 35–45)
PCO2 BLDA: 51 MMHG (ref 35–45)
PCO2 BLDA: 52 MMHG (ref 35–45)
PCO2 BLDA: 57 MMHG (ref 35–45)
PCO2 BLDA: 64 MMHG (ref 35–45)
PCO2 BLDA: 70 MMHG (ref 35–45)
PCO2 BLDA: 72 MMHG (ref 35–45)
PCO2 BLDA: 75 MMHG (ref 35–45)
PCO2 BLDA: 94 MMHG (ref 35–45)
PEEP MAX SETTING VENT: 35 CM[H2O]
PEEP MAX SETTING VENT: 35 CM[H2O]
PEEP RESPIRATORY: 0 CM[H2O]
PEEP RESPIRATORY: 0 CM[H2O]
PEEP RESPIRATORY: 10 CM[H2O]
PEEP RESPIRATORY: 10 CM[H2O]
PEEP RESPIRATORY: 14 CMH2O
PEEP RESPIRATORY: 14 CMH2O
PEEP RESPIRATORY: 14 CM[H2O]
PEEP RESPIRATORY: 15 CMH2O
PEEP RESPIRATORY: 16 CMH2O
PEEP RESPIRATORY: 16 CMH2O
PEEP RESPIRATORY: 16 CM[H2O]
PEEP RESPIRATORY: 19 CM[H2O]
PH BLD: 7.1 [PH] (ref 7.35–7.45)
PH BLD: 7.35 [PH] (ref 7.35–7.45)
PH BLD: 7.35 [PH] (ref 7.35–7.45)
PH BLD: 7.37 [PH] (ref 7.35–7.45)
PH BLD: 7.4 [PH] (ref 7.35–7.45)
PH BLD: 7.41 [PH] (ref 7.35–7.45)
PH BLD: 7.44 [PH] (ref 7.35–7.45)
PH BLDA: 7.03 [PH] (ref 7.35–7.45)
PH BLDA: 7.19 [PH] (ref 7.35–7.45)
PH BLDA: 7.19 [PH] (ref 7.35–7.45)
PH BLDA: 7.2 [PH] (ref 7.35–7.45)
PH BLDA: 7.25 [PH] (ref 7.35–7.45)
PH BLDA: 7.28 [PH] (ref 7.35–7.45)
PH BLDA: 7.29 [PH] (ref 7.35–7.45)
PH BLDA: 7.33 [PH] (ref 7.35–7.45)
PH BLDA: 7.35 [PH] (ref 7.35–7.45)
PH BLDA: 7.39 [PH] (ref 7.35–7.45)
PH BLDA: 7.42 [PH] (ref 7.35–7.45)
PH BLDA: 7.43 [PH] (ref 7.35–7.45)
PH BLDA: 7.43 [PH] (ref 7.35–7.45)
PH BLDA: 7.44 [PH] (ref 7.35–7.45)
PHOSPHATE SERPL-MCNC: 2.3 MG/DL (ref 2.6–4.7)
PHOSPHATE SERPL-MCNC: 2.5 MG/DL (ref 2.6–4.7)
PHOSPHATE SERPL-MCNC: 3 MG/DL (ref 2.6–4.7)
PHOSPHATE SERPL-MCNC: 3.2 MG/DL (ref 2.6–4.7)
PHOSPHATE SERPL-MCNC: 3.3 MG/DL (ref 2.6–4.7)
PHOSPHATE SERPL-MCNC: 3.4 MG/DL (ref 2.6–4.7)
PHOSPHATE SERPL-MCNC: 3.4 MG/DL (ref 2.6–4.7)
PHOSPHATE SERPL-MCNC: 3.5 MG/DL (ref 2.6–4.7)
PHOSPHATE SERPL-MCNC: 3.5 MG/DL (ref 2.6–4.7)
PHOSPHATE SERPL-MCNC: 3.6 MG/DL (ref 2.6–4.7)
PHOSPHATE SERPL-MCNC: 3.6 MG/DL (ref 2.6–4.7)
PHOSPHATE SERPL-MCNC: 3.7 MG/DL (ref 2.6–4.7)
PHOSPHATE SERPL-MCNC: 3.8 MG/DL (ref 2.6–4.7)
PHOSPHATE SERPL-MCNC: 3.9 MG/DL (ref 2.6–4.7)
PHOSPHATE SERPL-MCNC: 3.9 MG/DL (ref 2.6–4.7)
PHOSPHATE SERPL-MCNC: 4 MG/DL (ref 2.6–4.7)
PHOSPHATE SERPL-MCNC: 4.2 MG/DL (ref 2.6–4.7)
PHOSPHATE SERPL-MCNC: 4.3 MG/DL (ref 2.6–4.7)
PHOSPHATE SERPL-MCNC: 5.1 MG/DL (ref 2.6–4.7)
PHOSPHATE SERPL-MCNC: 5.6 MG/DL (ref 2.6–4.7)
PHOSPHATE SERPL-MCNC: 5.9 MG/DL (ref 2.6–4.7)
PHOSPHATE SERPL-MCNC: 6 MG/DL (ref 2.6–4.7)
PHOSPHATE SERPL-MCNC: 8.4 MG/DL (ref 2.6–4.7)
PIP ISTAT,IPIP: 15
PIP ISTAT,IPIP: 24
PIP ISTAT,IPIP: 28
PLATELET # BLD AUTO: 122 K/UL (ref 150–400)
PLATELET # BLD AUTO: 125 K/UL (ref 150–400)
PLATELET # BLD AUTO: 125 K/UL (ref 150–400)
PLATELET # BLD AUTO: 126 K/UL (ref 150–400)
PLATELET # BLD AUTO: 127 K/UL (ref 150–400)
PLATELET # BLD AUTO: 129 K/UL (ref 150–400)
PLATELET # BLD AUTO: 132 K/UL (ref 150–400)
PLATELET # BLD AUTO: 134 K/UL (ref 150–400)
PLATELET # BLD AUTO: 135 K/UL (ref 150–400)
PLATELET # BLD AUTO: 139 K/UL (ref 150–400)
PLATELET # BLD AUTO: 140 K/UL (ref 150–400)
PLATELET # BLD AUTO: 144 K/UL (ref 150–400)
PLATELET # BLD AUTO: 144 K/UL (ref 150–400)
PLATELET # BLD AUTO: 150 K/UL (ref 150–400)
PLATELET # BLD AUTO: 165 K/UL (ref 150–400)
PLATELET # BLD AUTO: 176 K/UL (ref 150–400)
PLATELET # BLD AUTO: 187 K/UL (ref 150–400)
PLATELET # BLD AUTO: 210 K/UL (ref 150–400)
PLATELET # BLD AUTO: 223 K/UL (ref 150–400)
PLATELET # BLD AUTO: 237 K/UL (ref 150–400)
PLATELET # BLD AUTO: 85 K/UL (ref 150–400)
PLEASE NOTE, SPNG4: NORMAL
PMV BLD AUTO: 10.5 FL (ref 8.9–12.9)
PMV BLD AUTO: 10.7 FL (ref 8.9–12.9)
PMV BLD AUTO: 10.7 FL (ref 8.9–12.9)
PMV BLD AUTO: 10.9 FL (ref 8.9–12.9)
PMV BLD AUTO: 11.2 FL (ref 8.9–12.9)
PMV BLD AUTO: 11.4 FL (ref 8.9–12.9)
PMV BLD AUTO: 11.5 FL (ref 8.9–12.9)
PMV BLD AUTO: 11.5 FL (ref 8.9–12.9)
PMV BLD AUTO: 11.6 FL (ref 8.9–12.9)
PMV BLD AUTO: 11.7 FL (ref 8.9–12.9)
PMV BLD AUTO: 11.7 FL (ref 8.9–12.9)
PMV BLD AUTO: 11.8 FL (ref 8.9–12.9)
PMV BLD AUTO: 11.9 FL (ref 8.9–12.9)
PMV BLD AUTO: 12.1 FL (ref 8.9–12.9)
PMV BLD AUTO: 12.1 FL (ref 8.9–12.9)
PMV BLD AUTO: 12.2 FL (ref 8.9–12.9)
PMV BLD AUTO: 12.3 FL (ref 8.9–12.9)
PMV BLD AUTO: 12.4 FL (ref 8.9–12.9)
PO2 BLD: 108 MMHG (ref 80–100)
PO2 BLD: 115 MMHG (ref 80–100)
PO2 BLD: 199 MMHG (ref 80–100)
PO2 BLD: 54 MMHG (ref 80–100)
PO2 BLD: 58 MMHG (ref 80–100)
PO2 BLD: 66 MMHG (ref 80–100)
PO2 BLD: 76 MMHG (ref 80–100)
PO2 BLDA: 103 MMHG (ref 80–100)
PO2 BLDA: 118 MMHG (ref 80–100)
PO2 BLDA: 129 MMHG (ref 80–100)
PO2 BLDA: 132 MMHG (ref 80–100)
PO2 BLDA: 149 MMHG (ref 80–100)
PO2 BLDA: 168 MMHG (ref 80–100)
PO2 BLDA: 172 MMHG (ref 80–100)
PO2 BLDA: 203 MMHG (ref 80–100)
PO2 BLDA: 204 MMHG (ref 80–100)
PO2 BLDA: 21 MMHG (ref 80–100)
PO2 BLDA: 46 MMHG (ref 80–100)
PO2 BLDA: 51 MMHG (ref 80–100)
PO2 BLDA: 53 MMHG (ref 80–100)
PO2 BLDA: 58 MMHG (ref 80–100)
PO2 BLDA: 66 MMHG (ref 80–100)
PO2 BLDA: 85 MMHG (ref 80–100)
POTASSIUM SERPL-SCNC: 3.3 MMOL/L (ref 3.5–5.1)
POTASSIUM SERPL-SCNC: 3.6 MMOL/L (ref 3.5–5.1)
POTASSIUM SERPL-SCNC: 4 MMOL/L (ref 3.5–5.1)
POTASSIUM SERPL-SCNC: 4.1 MMOL/L (ref 3.5–5.1)
POTASSIUM SERPL-SCNC: 4.2 MMOL/L (ref 3.5–5.1)
POTASSIUM SERPL-SCNC: 4.2 MMOL/L (ref 3.5–5.1)
POTASSIUM SERPL-SCNC: 4.4 MMOL/L (ref 3.5–5.1)
POTASSIUM SERPL-SCNC: 4.5 MMOL/L (ref 3.5–5.1)
POTASSIUM SERPL-SCNC: 4.6 MMOL/L (ref 3.5–5.1)
POTASSIUM SERPL-SCNC: 4.7 MMOL/L (ref 3.5–5.1)
POTASSIUM SERPL-SCNC: 4.7 MMOL/L (ref 3.5–5.1)
POTASSIUM SERPL-SCNC: 4.8 MMOL/L (ref 3.5–5.1)
POTASSIUM SERPL-SCNC: 4.9 MMOL/L (ref 3.5–5.1)
POTASSIUM SERPL-SCNC: 5 MMOL/L (ref 3.5–5.1)
POTASSIUM SERPL-SCNC: 5 MMOL/L (ref 3.5–5.1)
POTASSIUM SERPL-SCNC: 5.1 MMOL/L (ref 3.5–5.1)
POTASSIUM SERPL-SCNC: 5.3 MMOL/L (ref 3.5–5.1)
POTASSIUM SERPL-SCNC: 5.4 MMOL/L (ref 3.5–5.1)
POTASSIUM SERPL-SCNC: 5.7 MMOL/L (ref 3.5–5.1)
POTASSIUM SERPL-SCNC: 5.7 MMOL/L (ref 3.5–5.1)
POTASSIUM SERPL-SCNC: 6.2 MMOL/L (ref 3.5–5.1)
POTASSIUM SERPL-SCNC: 6.5 MMOL/L (ref 3.5–5.1)
POTASSIUM SERPL-SCNC: 6.8 MMOL/L (ref 3.5–5.1)
PRESSURE SUPPORT SETTING VENT: 0 CM[H2O]
PRESSURE SUPPORT SETTING VENT: 0 CM[H2O]
PRESSURE SUPPORT SETTING VENT: 16 CM[H2O]
PRESSURE SUPPORT SETTING VENT: 28 CM[H2O]
PROCALCITONIN SERPL-MCNC: 0.05 NG/ML
PROCALCITONIN SERPL-MCNC: 0.09 NG/ML
PROCALCITONIN SERPL-MCNC: 0.1 NG/ML
PROCALCITONIN SERPL-MCNC: 0.11 NG/ML
PROCALCITONIN SERPL-MCNC: 0.11 NG/ML
PROCALCITONIN SERPL-MCNC: 0.14 NG/ML
PROCALCITONIN SERPL-MCNC: 0.14 NG/ML
PROCALCITONIN SERPL-MCNC: 0.17 NG/ML
PROCALCITONIN SERPL-MCNC: 0.26 NG/ML
PROCALCITONIN SERPL-MCNC: 0.29 NG/ML
PROCALCITONIN SERPL-MCNC: 0.36 NG/ML
PROCALCITONIN SERPL-MCNC: 0.36 NG/ML
PROCALCITONIN SERPL-MCNC: 0.46 NG/ML
PROCALCITONIN SERPL-MCNC: 1.88 NG/ML
PROCALCITONIN SERPL-MCNC: 3.29 NG/ML
PROCALCITONIN SERPL-MCNC: 49.47 NG/ML
PROCALCITONIN SERPL-MCNC: <0.05 NG/ML
PROT SERPL-MCNC: 5.5 G/DL (ref 6.4–8.2)
PROT SERPL-MCNC: 5.5 G/DL (ref 6.4–8.2)
PROT SERPL-MCNC: 5.6 G/DL (ref 6.4–8.2)
PROT SERPL-MCNC: 5.6 G/DL (ref 6.4–8.2)
PROT SERPL-MCNC: 5.7 G/DL (ref 6.4–8.2)
PROT SERPL-MCNC: 5.7 G/DL (ref 6.4–8.2)
PROT SERPL-MCNC: 5.8 G/DL (ref 6.4–8.2)
PROT SERPL-MCNC: 5.9 G/DL (ref 6.4–8.2)
PROT SERPL-MCNC: 5.9 G/DL (ref 6.4–8.2)
PROT SERPL-MCNC: 6 G/DL (ref 6.4–8.2)
PROT SERPL-MCNC: 6.1 G/DL (ref 6.4–8.2)
PROT SERPL-MCNC: 6.1 G/DL (ref 6.4–8.2)
PROT SERPL-MCNC: 6.2 G/DL (ref 6.4–8.2)
PROT SERPL-MCNC: 6.3 G/DL (ref 6.4–8.2)
PROT SERPL-MCNC: 6.4 G/DL (ref 6.4–8.2)
PROT SERPL-MCNC: 6.4 G/DL (ref 6.4–8.2)
PROT SERPL-MCNC: 6.6 G/DL (ref 6.4–8.2)
PROT SERPL-MCNC: 6.7 G/DL (ref 6.4–8.2)
PROT SERPL-MCNC: 6.7 G/DL (ref 6.4–8.2)
PROT SERPL-MCNC: 6.8 G/DL (ref 6.4–8.2)
PROT SERPL-MCNC: 7 G/DL (ref 6.4–8.2)
PROT SERPL-MCNC: 7.8 G/DL (ref 6.4–8.2)
PROTHROMBIN TIME: 11.3 SEC (ref 9–11.1)
Q-T INTERVAL, ECG07: 316 MS
QRS DURATION, ECG06: 82 MS
QTC CALCULATION (BEZET), ECG08: 415 MS
RBC # BLD AUTO: 3.22 M/UL (ref 4.1–5.7)
RBC # BLD AUTO: 3.84 M/UL (ref 4.1–5.7)
RBC # BLD AUTO: 3.86 M/UL (ref 4.1–5.7)
RBC # BLD AUTO: 3.89 M/UL (ref 4.1–5.7)
RBC # BLD AUTO: 4 M/UL (ref 4.1–5.7)
RBC # BLD AUTO: 4.07 M/UL (ref 4.1–5.7)
RBC # BLD AUTO: 4.39 M/UL (ref 4.1–5.7)
RBC # BLD AUTO: 4.41 M/UL (ref 4.1–5.7)
RBC # BLD AUTO: 4.41 M/UL (ref 4.1–5.7)
RBC # BLD AUTO: 4.56 M/UL (ref 4.1–5.7)
RBC # BLD AUTO: 5.12 M/UL (ref 4.1–5.7)
RBC # BLD AUTO: 5.13 M/UL (ref 4.1–5.7)
RBC # BLD AUTO: 5.19 M/UL (ref 4.1–5.7)
RBC # BLD AUTO: 5.21 M/UL (ref 4.1–5.7)
RBC # BLD AUTO: 5.26 M/UL (ref 4.1–5.7)
RBC # BLD AUTO: 5.27 M/UL (ref 4.1–5.7)
RBC # BLD AUTO: 5.28 M/UL (ref 4.1–5.7)
RBC # BLD AUTO: 5.43 M/UL (ref 4.1–5.7)
RBC # BLD AUTO: 5.47 M/UL (ref 4.1–5.7)
RBC # BLD AUTO: 5.52 M/UL (ref 4.1–5.7)
RBC # BLD AUTO: 5.8 M/UL (ref 4.1–5.7)
RBC MORPH BLD: ABNORMAL
S PNEUM AG SPEC QL LA: NEGATIVE
SAMPLES BEING HELD,HOLD: NORMAL
SAO2 % BLD: 100 % (ref 95–99)
SAO2 % BLD: 24 % (ref 92–97)
SAO2 % BLD: 67 % (ref 92–97)
SAO2 % BLD: 82 % (ref 92–97)
SAO2 % BLD: 87.7 % (ref 92–97)
SAO2 % BLD: 87.8 % (ref 92–97)
SAO2 % BLD: 88 % (ref 92–97)
SAO2 % BLD: 89 % (ref 95–99)
SAO2 % BLD: 91 % (ref 92–97)
SAO2 % BLD: 91.2 % (ref 92–97)
SAO2 % BLD: 95.1 % (ref 92–97)
SAO2 % BLD: 97 % (ref 92–97)
SAO2 % BLD: 97 % (ref 92–97)
SAO2 % BLD: 98 % (ref 92–97)
SAO2 % BLD: 98 % (ref 95–99)
SAO2 % BLD: 98.7 % (ref 92–97)
SAO2 % BLD: 99 % (ref 92–97)
SAO2 % BLD: 99 % (ref 95–99)
SAO2 % BLD: 99 % (ref 95–99)
SAO2 % BLD: 99.2 % (ref 92–97)
SAO2% DEVICE SAO2% SENSOR NAME: ABNORMAL
SAO2% DEVICE SAO2% SENSOR NAME: NORMAL
SERVICE CMNT-IMP: ABNORMAL
SERVICE CMNT-IMP: NORMAL
SODIUM SERPL-SCNC: 128 MMOL/L (ref 136–145)
SODIUM SERPL-SCNC: 129 MMOL/L (ref 136–145)
SODIUM SERPL-SCNC: 129 MMOL/L (ref 136–145)
SODIUM SERPL-SCNC: 130 MMOL/L (ref 136–145)
SODIUM SERPL-SCNC: 131 MMOL/L (ref 136–145)
SODIUM SERPL-SCNC: 131 MMOL/L (ref 136–145)
SODIUM SERPL-SCNC: 133 MMOL/L (ref 136–145)
SODIUM SERPL-SCNC: 135 MMOL/L (ref 136–145)
SODIUM SERPL-SCNC: 135 MMOL/L (ref 136–145)
SODIUM SERPL-SCNC: 136 MMOL/L (ref 136–145)
SODIUM SERPL-SCNC: 136 MMOL/L (ref 136–145)
SODIUM SERPL-SCNC: 137 MMOL/L (ref 136–145)
SODIUM SERPL-SCNC: 138 MMOL/L (ref 136–145)
SODIUM SERPL-SCNC: 139 MMOL/L (ref 136–145)
SODIUM SERPL-SCNC: 140 MMOL/L (ref 136–145)
SODIUM SERPL-SCNC: 141 MMOL/L (ref 136–145)
SODIUM SERPL-SCNC: 142 MMOL/L (ref 136–145)
SODIUM SERPL-SCNC: 143 MMOL/L (ref 136–145)
SODIUM SERPL-SCNC: 143 MMOL/L (ref 136–145)
SODIUM SERPL-SCNC: 144 MMOL/L (ref 136–145)
SODIUM SERPL-SCNC: 144 MMOL/L (ref 136–145)
SODIUM UR-SCNC: 26 MMOL/L
SOURCE, COVRS: ABNORMAL
SPECIMEN SITE: ABNORMAL
SPECIMEN SITE: NORMAL
SPECIMEN SOURCE: NORMAL
SPECIMEN SOURCE: NORMAL
SPECIMEN TYPE: ABNORMAL
SPECIMEN, SPNG1: NORMAL
THERAPEUTIC RANGE,PTTT: NORMAL SECS (ref 58–77)
TRIGL SERPL-MCNC: 251 MG/DL (ref ?–150)
TRIGL SERPL-MCNC: 303 MG/DL (ref ?–150)
TRIGL SERPL-MCNC: 352 MG/DL (ref ?–150)
TROPONIN I SERPL-MCNC: <0.05 NG/ML
TROPONIN I SERPL-MCNC: <0.05 NG/ML
VANCOMYCIN SERPL-MCNC: 1.7 UG/ML
VANCOMYCIN SERPL-MCNC: 34.4 UG/ML
VENTILATION MODE VENT: ABNORMAL
VENTILATION MODE VENT: NORMAL
VENTRICULAR RATE, ECG03: 104 BPM
VT SETTING VENT: 240 ML
WBC # BLD AUTO: 12.9 K/UL (ref 4.1–11.1)
WBC # BLD AUTO: 14.3 K/UL (ref 4.1–11.1)
WBC # BLD AUTO: 14.3 K/UL (ref 4.1–11.1)
WBC # BLD AUTO: 14.4 K/UL (ref 4.1–11.1)
WBC # BLD AUTO: 14.6 K/UL (ref 4.1–11.1)
WBC # BLD AUTO: 15.4 K/UL (ref 4.1–11.1)
WBC # BLD AUTO: 15.7 K/UL (ref 4.1–11.1)
WBC # BLD AUTO: 15.9 K/UL (ref 4.1–11.1)
WBC # BLD AUTO: 16 K/UL (ref 4.1–11.1)
WBC # BLD AUTO: 16.2 K/UL (ref 4.1–11.1)
WBC # BLD AUTO: 16.2 K/UL (ref 4.1–11.1)
WBC # BLD AUTO: 16.3 K/UL (ref 4.1–11.1)
WBC # BLD AUTO: 17.2 K/UL (ref 4.1–11.1)
WBC # BLD AUTO: 21.4 K/UL (ref 4.1–11.1)
WBC # BLD AUTO: 21.6 K/UL (ref 4.1–11.1)
WBC # BLD AUTO: 3.2 K/UL (ref 4.1–11.1)
WBC # BLD AUTO: 3.3 K/UL (ref 4.1–11.1)
WBC # BLD AUTO: 4 K/UL (ref 4.1–11.1)
WBC # BLD AUTO: 5.7 K/UL (ref 4.1–11.1)
WBC # BLD AUTO: 7.6 K/UL (ref 4.1–11.1)
WBC # BLD AUTO: 7.9 K/UL (ref 4.1–11.1)

## 2021-01-01 PROCEDURE — 87205 SMEAR GRAM STAIN: CPT

## 2021-01-01 PROCEDURE — 82803 BLOOD GASES ANY COMBINATION: CPT

## 2021-01-01 PROCEDURE — 74011000258 HC RX REV CODE- 258: Performed by: INTERNAL MEDICINE

## 2021-01-01 PROCEDURE — 74011000250 HC RX REV CODE- 250: Performed by: INTERNAL MEDICINE

## 2021-01-01 PROCEDURE — 74011250636 HC RX REV CODE- 250/636

## 2021-01-01 PROCEDURE — 74011000250 HC RX REV CODE- 250: Performed by: NURSE PRACTITIONER

## 2021-01-01 PROCEDURE — 74011250636 HC RX REV CODE- 250/636: Performed by: INTERNAL MEDICINE

## 2021-01-01 PROCEDURE — 84145 PROCALCITONIN (PCT): CPT

## 2021-01-01 PROCEDURE — 85379 FIBRIN DEGRADATION QUANT: CPT

## 2021-01-01 PROCEDURE — 74011250637 HC RX REV CODE- 250/637: Performed by: ANESTHESIOLOGY

## 2021-01-01 PROCEDURE — 73610000026 HC INO THERAPY EACH HOUR

## 2021-01-01 PROCEDURE — 74011250636 HC RX REV CODE- 250/636: Performed by: ANESTHESIOLOGY

## 2021-01-01 PROCEDURE — 74011250636 HC RX REV CODE- 250/636: Performed by: STUDENT IN AN ORGANIZED HEALTH CARE EDUCATION/TRAINING PROGRAM

## 2021-01-01 PROCEDURE — 82436 ASSAY OF URINE CHLORIDE: CPT

## 2021-01-01 PROCEDURE — 36600 WITHDRAWAL OF ARTERIAL BLOOD: CPT

## 2021-01-01 PROCEDURE — 74011250637 HC RX REV CODE- 250/637: Performed by: FAMILY MEDICINE

## 2021-01-01 PROCEDURE — 82962 GLUCOSE BLOOD TEST: CPT

## 2021-01-01 PROCEDURE — 74011000258 HC RX REV CODE- 258: Performed by: HOSPITALIST

## 2021-01-01 PROCEDURE — 71045 X-RAY EXAM CHEST 1 VIEW: CPT

## 2021-01-01 PROCEDURE — 84100 ASSAY OF PHOSPHORUS: CPT

## 2021-01-01 PROCEDURE — 85610 PROTHROMBIN TIME: CPT

## 2021-01-01 PROCEDURE — 74018 RADEX ABDOMEN 1 VIEW: CPT

## 2021-01-01 PROCEDURE — 85027 COMPLETE CBC AUTOMATED: CPT

## 2021-01-01 PROCEDURE — 74011250637 HC RX REV CODE- 250/637: Performed by: HOSPITALIST

## 2021-01-01 PROCEDURE — 36415 COLL VENOUS BLD VENIPUNCTURE: CPT

## 2021-01-01 PROCEDURE — 94003 VENT MGMT INPAT SUBQ DAY: CPT

## 2021-01-01 PROCEDURE — 84484 ASSAY OF TROPONIN QUANT: CPT

## 2021-01-01 PROCEDURE — 74011250637 HC RX REV CODE- 250/637: Performed by: NURSE PRACTITIONER

## 2021-01-01 PROCEDURE — 65610000006 HC RM INTENSIVE CARE

## 2021-01-01 PROCEDURE — 74011250637 HC RX REV CODE- 250/637: Performed by: INTERNAL MEDICINE

## 2021-01-01 PROCEDURE — 74011250636 HC RX REV CODE- 250/636: Performed by: HOSPITALIST

## 2021-01-01 PROCEDURE — 0W9B30Z DRAINAGE OF LEFT PLEURAL CAVITY WITH DRAINAGE DEVICE, PERCUTANEOUS APPROACH: ICD-10-PCS | Performed by: INTERNAL MEDICINE

## 2021-01-01 PROCEDURE — 80053 COMPREHEN METABOLIC PANEL: CPT

## 2021-01-01 PROCEDURE — 73610000005 HC INO THERAPY INITIAL

## 2021-01-01 PROCEDURE — 74011636637 HC RX REV CODE- 636/637: Performed by: STUDENT IN AN ORGANIZED HEALTH CARE EDUCATION/TRAINING PROGRAM

## 2021-01-01 PROCEDURE — 74011000250 HC RX REV CODE- 250: Performed by: HOSPITALIST

## 2021-01-01 PROCEDURE — 87635 SARS-COV-2 COVID-19 AMP PRB: CPT

## 2021-01-01 PROCEDURE — 87040 BLOOD CULTURE FOR BACTERIA: CPT

## 2021-01-01 PROCEDURE — 77030012390 HC DRN CHST BTL GTNG -B

## 2021-01-01 PROCEDURE — 94640 AIRWAY INHALATION TREATMENT: CPT

## 2021-01-01 PROCEDURE — 86140 C-REACTIVE PROTEIN: CPT

## 2021-01-01 PROCEDURE — 83615 LACTATE (LD) (LDH) ENZYME: CPT

## 2021-01-01 PROCEDURE — 83735 ASSAY OF MAGNESIUM: CPT

## 2021-01-01 PROCEDURE — 94660 CPAP INITIATION&MGMT: CPT

## 2021-01-01 PROCEDURE — 85384 FIBRINOGEN ACTIVITY: CPT

## 2021-01-01 PROCEDURE — C9113 INJ PANTOPRAZOLE SODIUM, VIA: HCPCS | Performed by: ANESTHESIOLOGY

## 2021-01-01 PROCEDURE — 74011636637 HC RX REV CODE- 636/637: Performed by: NURSE PRACTITIONER

## 2021-01-01 PROCEDURE — 74011250636 HC RX REV CODE- 250/636: Performed by: NURSE PRACTITIONER

## 2021-01-01 PROCEDURE — 85025 COMPLETE CBC W/AUTO DIFF WBC: CPT

## 2021-01-01 PROCEDURE — 80069 RENAL FUNCTION PANEL: CPT

## 2021-01-01 PROCEDURE — C9113 INJ PANTOPRAZOLE SODIUM, VIA: HCPCS | Performed by: INTERNAL MEDICINE

## 2021-01-01 PROCEDURE — C1751 CATH, INF, PER/CENT/MIDLINE: HCPCS

## 2021-01-01 PROCEDURE — 74011000250 HC RX REV CODE- 250: Performed by: ANESTHESIOLOGY

## 2021-01-01 PROCEDURE — 80048 BASIC METABOLIC PNL TOTAL CA: CPT

## 2021-01-01 PROCEDURE — 94002 VENT MGMT INPAT INIT DAY: CPT

## 2021-01-01 PROCEDURE — 87449 NOS EACH ORGANISM AG IA: CPT

## 2021-01-01 PROCEDURE — 84478 ASSAY OF TRIGLYCERIDES: CPT

## 2021-01-01 PROCEDURE — 77030020365 HC SOL INJ SOD CL 0.9% 50ML

## 2021-01-01 PROCEDURE — 83605 ASSAY OF LACTIC ACID: CPT

## 2021-01-01 PROCEDURE — 82550 ASSAY OF CK (CPK): CPT

## 2021-01-01 PROCEDURE — 99285 EMERGENCY DEPT VISIT HI MDM: CPT

## 2021-01-01 PROCEDURE — 77010033711 HC HIGH FLOW OXYGEN

## 2021-01-01 PROCEDURE — 36573 INSJ PICC RS&I 5 YR+: CPT | Performed by: INTERNAL MEDICINE

## 2021-01-01 PROCEDURE — 74011000258 HC RX REV CODE- 258: Performed by: ANESTHESIOLOGY

## 2021-01-01 PROCEDURE — 74011636637 HC RX REV CODE- 636/637: Performed by: INTERNAL MEDICINE

## 2021-01-01 PROCEDURE — 99222 1ST HOSP IP/OBS MODERATE 55: CPT | Performed by: NURSE PRACTITIONER

## 2021-01-01 PROCEDURE — 94664 DEMO&/EVAL PT USE INHALER: CPT

## 2021-01-01 PROCEDURE — 74011000636 HC RX REV CODE- 636: Performed by: RADIOLOGY

## 2021-01-01 PROCEDURE — 74011000258 HC RX REV CODE- 258: Performed by: NURSE PRACTITIONER

## 2021-01-01 PROCEDURE — 5A09557 ASSISTANCE WITH RESPIRATORY VENTILATION, GREATER THAN 96 CONSECUTIVE HOURS, CONTINUOUS POSITIVE AIRWAY PRESSURE: ICD-10-PCS | Performed by: INTERNAL MEDICINE

## 2021-01-01 PROCEDURE — 36592 COLLECT BLOOD FROM PICC: CPT

## 2021-01-01 PROCEDURE — 74011000250 HC RX REV CODE- 250: Performed by: STUDENT IN AN ORGANIZED HEALTH CARE EDUCATION/TRAINING PROGRAM

## 2021-01-01 PROCEDURE — 5A1955Z RESPIRATORY VENTILATION, GREATER THAN 96 CONSECUTIVE HOURS: ICD-10-PCS | Performed by: INTERNAL MEDICINE

## 2021-01-01 PROCEDURE — 87070 CULTURE OTHR SPECIMN AEROBIC: CPT

## 2021-01-01 PROCEDURE — 74011000250 HC RX REV CODE- 250

## 2021-01-01 PROCEDURE — 31500 INSERT EMERGENCY AIRWAY: CPT

## 2021-01-01 PROCEDURE — 93306 TTE W/DOPPLER COMPLETE: CPT | Performed by: SPECIALIST

## 2021-01-01 PROCEDURE — 76937 US GUIDE VASCULAR ACCESS: CPT

## 2021-01-01 PROCEDURE — 93306 TTE W/DOPPLER COMPLETE: CPT

## 2021-01-01 PROCEDURE — 87899 AGENT NOS ASSAY W/OPTIC: CPT

## 2021-01-01 PROCEDURE — 84300 ASSAY OF URINE SODIUM: CPT

## 2021-01-01 PROCEDURE — 02HV33Z INSERTION OF INFUSION DEVICE INTO SUPERIOR VENA CAVA, PERCUTANEOUS APPROACH: ICD-10-PCS | Performed by: INTERNAL MEDICINE

## 2021-01-01 PROCEDURE — XW033H5 INTRODUCTION OF TOCILIZUMAB INTO PERIPHERAL VEIN, PERCUTANEOUS APPROACH, NEW TECHNOLOGY GROUP 5: ICD-10-PCS | Performed by: INTERNAL MEDICINE

## 2021-01-01 PROCEDURE — 99283 EMERGENCY DEPT VISIT LOW MDM: CPT

## 2021-01-01 PROCEDURE — 94762 N-INVAS EAR/PLS OXIMTRY CONT: CPT

## 2021-01-01 PROCEDURE — XW033E5 INTRODUCTION OF REMDESIVIR ANTI-INFECTIVE INTO PERIPHERAL VEIN, PERCUTANEOUS APPROACH, NEW TECHNOLOGY GROUP 5: ICD-10-PCS | Performed by: HOSPITALIST

## 2021-01-01 PROCEDURE — 82570 ASSAY OF URINE CREATININE: CPT

## 2021-01-01 PROCEDURE — 71275 CT ANGIOGRAPHY CHEST: CPT

## 2021-01-01 PROCEDURE — 65660000001 HC RM ICU INTERMED STEPDOWN

## 2021-01-01 PROCEDURE — 82728 ASSAY OF FERRITIN: CPT

## 2021-01-01 PROCEDURE — 65660000000 HC RM CCU STEPDOWN

## 2021-01-01 PROCEDURE — 0BH17EZ INSERTION OF ENDOTRACHEAL AIRWAY INTO TRACHEA, VIA NATURAL OR ARTIFICIAL OPENING: ICD-10-PCS | Performed by: INTERNAL MEDICINE

## 2021-01-01 PROCEDURE — 77030020847 HC STATLOK BARD -A

## 2021-01-01 PROCEDURE — 93005 ELECTROCARDIOGRAM TRACING: CPT

## 2021-01-01 PROCEDURE — 83880 ASSAY OF NATRIURETIC PEPTIDE: CPT

## 2021-01-01 PROCEDURE — 82375 ASSAY CARBOXYHB QUANT: CPT

## 2021-01-01 PROCEDURE — 93970 EXTREMITY STUDY: CPT

## 2021-01-01 PROCEDURE — 85730 THROMBOPLASTIN TIME PARTIAL: CPT

## 2021-01-01 PROCEDURE — 80202 ASSAY OF VANCOMYCIN: CPT

## 2021-01-01 PROCEDURE — P9047 ALBUMIN (HUMAN), 25%, 50ML: HCPCS | Performed by: INTERNAL MEDICINE

## 2021-01-01 RX ORDER — FUROSEMIDE 10 MG/ML
80 INJECTION INTRAMUSCULAR; INTRAVENOUS ONCE
Status: COMPLETED | OUTPATIENT
Start: 2021-01-01 | End: 2021-01-01

## 2021-01-01 RX ORDER — CHLORHEXIDINE GLUCONATE 0.12 MG/ML
15 RINSE ORAL EVERY 12 HOURS
Status: DISCONTINUED | OUTPATIENT
Start: 2021-01-01 | End: 2021-01-01 | Stop reason: HOSPADM

## 2021-01-01 RX ORDER — KETOROLAC TROMETHAMINE 30 MG/ML
15 INJECTION, SOLUTION INTRAMUSCULAR; INTRAVENOUS
Status: COMPLETED | OUTPATIENT
Start: 2021-01-01 | End: 2021-01-01

## 2021-01-01 RX ORDER — DEXAMETHASONE SODIUM PHOSPHATE 100 MG/10ML
6 INJECTION INTRAMUSCULAR; INTRAVENOUS EVERY 24 HOURS
Status: DISCONTINUED | OUTPATIENT
Start: 2021-01-01 | End: 2021-01-01

## 2021-01-01 RX ORDER — AZITHROMYCIN 250 MG/1
TABLET, FILM COATED ORAL
Qty: 6 TABLET | Refills: 0 | Status: SHIPPED | OUTPATIENT
Start: 2021-01-01

## 2021-01-01 RX ORDER — FENTANYL CITRATE 50 UG/ML
100 INJECTION, SOLUTION INTRAMUSCULAR; INTRAVENOUS ONCE
Status: COMPLETED | OUTPATIENT
Start: 2021-01-01 | End: 2021-01-01

## 2021-01-01 RX ORDER — ACETAMINOPHEN 325 MG/1
650 TABLET ORAL
Status: DISCONTINUED | OUTPATIENT
Start: 2021-01-01 | End: 2021-01-01 | Stop reason: HOSPADM

## 2021-01-01 RX ORDER — MELATONIN
2000 DAILY
Status: DISCONTINUED | OUTPATIENT
Start: 2021-01-01 | End: 2021-01-01

## 2021-01-01 RX ORDER — DEXTROSE MONOHYDRATE 50 MG/ML
25 INJECTION, SOLUTION INTRAVENOUS CONTINUOUS
Status: DISCONTINUED | OUTPATIENT
Start: 2021-01-01 | End: 2021-01-01

## 2021-01-01 RX ORDER — HYDRALAZINE HYDROCHLORIDE 20 MG/ML
10 INJECTION INTRAMUSCULAR; INTRAVENOUS
Status: DISCONTINUED | OUTPATIENT
Start: 2021-01-01 | End: 2021-01-01

## 2021-01-01 RX ORDER — DEXTROSE 50 % IN WATER (D50W) INTRAVENOUS SYRINGE
25 ONCE
Status: COMPLETED | OUTPATIENT
Start: 2021-01-01 | End: 2021-01-01

## 2021-01-01 RX ORDER — OXYCODONE HYDROCHLORIDE 5 MG/1
20 TABLET ORAL EVERY 8 HOURS
Status: DISCONTINUED | OUTPATIENT
Start: 2021-01-01 | End: 2021-01-01

## 2021-01-01 RX ORDER — SENNOSIDES 8.6 MG/1
2 TABLET ORAL DAILY
Status: DISCONTINUED | OUTPATIENT
Start: 2021-01-01 | End: 2021-01-01 | Stop reason: HOSPADM

## 2021-01-01 RX ORDER — ONDANSETRON 2 MG/ML
4 INJECTION INTRAMUSCULAR; INTRAVENOUS
Status: DISCONTINUED | OUTPATIENT
Start: 2021-01-01 | End: 2021-01-01 | Stop reason: HOSPADM

## 2021-01-01 RX ORDER — ALBUTEROL SULFATE 90 UG/1
2 AEROSOL, METERED RESPIRATORY (INHALATION)
Status: DISCONTINUED | OUTPATIENT
Start: 2021-01-01 | End: 2021-01-01

## 2021-01-01 RX ORDER — VANCOMYCIN HYDROCHLORIDE
1250 EVERY 12 HOURS
Status: DISCONTINUED | OUTPATIENT
Start: 2021-01-01 | End: 2021-01-01

## 2021-01-01 RX ORDER — BENZONATATE 100 MG/1
100 CAPSULE ORAL
Qty: 30 CAPSULE | Refills: 0 | Status: SHIPPED | OUTPATIENT
Start: 2021-01-01 | End: 2021-01-01

## 2021-01-01 RX ORDER — DEXMEDETOMIDINE HYDROCHLORIDE 4 UG/ML
.1-1.5 INJECTION, SOLUTION INTRAVENOUS
Status: DISCONTINUED | OUTPATIENT
Start: 2021-01-01 | End: 2021-01-01

## 2021-01-01 RX ORDER — MIDAZOLAM HYDROCHLORIDE 1 MG/ML
INJECTION, SOLUTION INTRAMUSCULAR; INTRAVENOUS
Status: DISPENSED
Start: 2021-01-01 | End: 2021-01-01

## 2021-01-01 RX ORDER — INSULIN LISPRO 100 [IU]/ML
INJECTION, SOLUTION INTRAVENOUS; SUBCUTANEOUS
Status: DISCONTINUED | OUTPATIENT
Start: 2021-01-01 | End: 2021-01-01

## 2021-01-01 RX ORDER — DEXTROSE 50 % IN WATER (D50W) INTRAVENOUS SYRINGE
12.5-25 AS NEEDED
Status: DISCONTINUED | OUTPATIENT
Start: 2021-01-01 | End: 2021-01-01 | Stop reason: HOSPADM

## 2021-01-01 RX ORDER — MIDAZOLAM HYDROCHLORIDE 1 MG/ML
5 INJECTION, SOLUTION INTRAMUSCULAR; INTRAVENOUS ONCE
Status: COMPLETED | OUTPATIENT
Start: 2021-01-01 | End: 2021-01-01

## 2021-01-01 RX ORDER — LANOLIN ALCOHOL/MO/W.PET/CERES
3 CREAM (GRAM) TOPICAL
Status: DISCONTINUED | OUTPATIENT
Start: 2021-01-01 | End: 2021-01-01

## 2021-01-01 RX ORDER — SODIUM CHLORIDE 9 MG/ML
1000 INJECTION, SOLUTION INTRAVENOUS CONTINUOUS
Status: DISCONTINUED | OUTPATIENT
Start: 2021-01-01 | End: 2021-01-01

## 2021-01-01 RX ORDER — BENZONATATE 100 MG/1
200 CAPSULE ORAL 3 TIMES DAILY
Status: DISCONTINUED | OUTPATIENT
Start: 2021-01-01 | End: 2021-01-01

## 2021-01-01 RX ORDER — ZINC SULFATE 50(220)MG
1 CAPSULE ORAL DAILY
Status: DISCONTINUED | OUTPATIENT
Start: 2021-01-01 | End: 2021-01-01

## 2021-01-01 RX ORDER — FUROSEMIDE 10 MG/ML
20 INJECTION INTRAMUSCULAR; INTRAVENOUS ONCE
Status: COMPLETED | OUTPATIENT
Start: 2021-01-01 | End: 2021-01-01

## 2021-01-01 RX ORDER — CALCIUM GLUCONATE 20 MG/ML
1 INJECTION, SOLUTION INTRAVENOUS ONCE
Status: COMPLETED | OUTPATIENT
Start: 2021-01-01 | End: 2021-01-01

## 2021-01-01 RX ORDER — SODIUM CHLORIDE 9 MG/ML
10 INJECTION, SOLUTION INTRAVENOUS CONTINUOUS
Status: DISCONTINUED | OUTPATIENT
Start: 2021-01-01 | End: 2021-01-01

## 2021-01-01 RX ORDER — SODIUM BICARBONATE 1 MEQ/ML
SYRINGE (ML) INTRAVENOUS
Status: COMPLETED | OUTPATIENT
Start: 2021-01-01 | End: 2021-01-01

## 2021-01-01 RX ORDER — MIDAZOLAM HYDROCHLORIDE 1 MG/ML
10 INJECTION, SOLUTION INTRAMUSCULAR; INTRAVENOUS ONCE
Status: COMPLETED | OUTPATIENT
Start: 2021-01-01 | End: 2021-01-01

## 2021-01-01 RX ORDER — PROPOFOL 10 MG/ML
0-50 INJECTION, EMULSION INTRAVENOUS
Status: DISCONTINUED | OUTPATIENT
Start: 2021-01-01 | End: 2021-01-01

## 2021-01-01 RX ORDER — ASCORBIC ACID 500 MG
500 TABLET ORAL 2 TIMES DAILY
Status: DISCONTINUED | OUTPATIENT
Start: 2021-01-01 | End: 2021-01-01

## 2021-01-01 RX ORDER — DOCUSATE SODIUM 50 MG/5ML
100 LIQUID ORAL DAILY
Status: DISCONTINUED | OUTPATIENT
Start: 2021-01-01 | End: 2021-01-01 | Stop reason: HOSPADM

## 2021-01-01 RX ORDER — SODIUM CHLORIDE 0.9 % (FLUSH) 0.9 %
5-40 SYRINGE (ML) INJECTION EVERY 8 HOURS
Status: DISCONTINUED | OUTPATIENT
Start: 2021-01-01 | End: 2021-01-01 | Stop reason: HOSPADM

## 2021-01-01 RX ORDER — ENOXAPARIN SODIUM 100 MG/ML
30 INJECTION SUBCUTANEOUS EVERY 12 HOURS
Status: DISCONTINUED | OUTPATIENT
Start: 2021-01-01 | End: 2021-01-01

## 2021-01-01 RX ORDER — MAGNESIUM SULFATE 100 %
4 CRYSTALS MISCELLANEOUS AS NEEDED
Status: DISCONTINUED | OUTPATIENT
Start: 2021-01-01 | End: 2021-01-01 | Stop reason: HOSPADM

## 2021-01-01 RX ORDER — ENOXAPARIN SODIUM 100 MG/ML
40 INJECTION SUBCUTANEOUS EVERY 12 HOURS
Status: DISCONTINUED | OUTPATIENT
Start: 2021-01-01 | End: 2021-01-01 | Stop reason: HOSPADM

## 2021-01-01 RX ORDER — ROCURONIUM BROMIDE 10 MG/ML
50 INJECTION, SOLUTION INTRAVENOUS
Status: COMPLETED | OUTPATIENT
Start: 2021-01-01 | End: 2021-01-01

## 2021-01-01 RX ORDER — NOREPINEPHRINE BITARTRATE/D5W 8 MG/250ML
1-100 PLASTIC BAG, INJECTION (ML) INTRAVENOUS
Status: DISCONTINUED | OUTPATIENT
Start: 2021-01-01 | End: 2021-01-01 | Stop reason: SDUPTHER

## 2021-01-01 RX ORDER — SODIUM BICARBONATE 84 MG/ML
100 INJECTION, SOLUTION INTRAVENOUS
Status: DISCONTINUED | OUTPATIENT
Start: 2021-01-01 | End: 2021-01-01

## 2021-01-01 RX ORDER — ROCURONIUM BROMIDE 10 MG/ML
INJECTION, SOLUTION INTRAVENOUS
Status: DISPENSED
Start: 2021-01-01 | End: 2021-01-01

## 2021-01-01 RX ORDER — CALCIUM GLUCONATE 20 MG/ML
2 INJECTION, SOLUTION INTRAVENOUS ONCE
Status: COMPLETED | OUTPATIENT
Start: 2021-01-01 | End: 2021-01-01

## 2021-01-01 RX ORDER — CODEINE PHOSPHATE AND GUAIFENESIN 10; 100 MG/5ML; MG/5ML
10 SOLUTION ORAL ONCE
Status: COMPLETED | OUTPATIENT
Start: 2021-01-01 | End: 2021-01-01

## 2021-01-01 RX ORDER — HYDRALAZINE HYDROCHLORIDE 20 MG/ML
INJECTION INTRAMUSCULAR; INTRAVENOUS
Status: COMPLETED
Start: 2021-01-01 | End: 2021-01-01

## 2021-01-01 RX ORDER — BENZONATATE 100 MG/1
100 CAPSULE ORAL 3 TIMES DAILY
Status: DISCONTINUED | OUTPATIENT
Start: 2021-01-01 | End: 2021-01-01

## 2021-01-01 RX ORDER — BUMETANIDE 0.25 MG/ML
2 INJECTION INTRAMUSCULAR; INTRAVENOUS ONCE
Status: COMPLETED | OUTPATIENT
Start: 2021-01-01 | End: 2021-01-01

## 2021-01-01 RX ORDER — BUMETANIDE 0.25 MG/ML
1 INJECTION INTRAMUSCULAR; INTRAVENOUS DAILY
Status: DISCONTINUED | OUTPATIENT
Start: 2021-01-01 | End: 2021-01-01

## 2021-01-01 RX ORDER — DEXTROMETHORPHAN POLISTIREX 30 MG/5ML
30 SUSPENSION ORAL
Status: DISCONTINUED | OUTPATIENT
Start: 2021-01-01 | End: 2021-01-01

## 2021-01-01 RX ORDER — EPINEPHRINE 0.1 MG/ML
INJECTION INTRACARDIAC; INTRAVENOUS
Status: DISCONTINUED
Start: 2021-01-01 | End: 2021-01-01 | Stop reason: HOSPADM

## 2021-01-01 RX ORDER — DIAZEPAM 5 MG/1
20 TABLET ORAL EVERY 8 HOURS
Status: DISCONTINUED | OUTPATIENT
Start: 2021-01-01 | End: 2021-01-01

## 2021-01-01 RX ORDER — DOCUSATE SODIUM 100 MG/1
100 CAPSULE, LIQUID FILLED ORAL DAILY
Status: DISCONTINUED | OUTPATIENT
Start: 2021-01-01 | End: 2021-01-01

## 2021-01-01 RX ORDER — PROPOFOL 10 MG/ML
0-50 VIAL (ML) INTRAVENOUS
Status: DISCONTINUED | OUTPATIENT
Start: 2021-01-01 | End: 2021-01-01 | Stop reason: HOSPADM

## 2021-01-01 RX ORDER — ROCURONIUM BROMIDE 10 MG/ML
INJECTION, SOLUTION INTRAVENOUS
Status: COMPLETED
Start: 2021-01-01 | End: 2021-01-01

## 2021-01-01 RX ORDER — OXYCODONE HYDROCHLORIDE 5 MG/1
15 TABLET ORAL EVERY 8 HOURS
Status: DISCONTINUED | OUTPATIENT
Start: 2021-01-01 | End: 2021-01-01

## 2021-01-01 RX ORDER — GUAIFENESIN 600 MG/1
1200 TABLET, EXTENDED RELEASE ORAL 2 TIMES DAILY
Status: DISCONTINUED | OUTPATIENT
Start: 2021-01-01 | End: 2021-01-01

## 2021-01-01 RX ORDER — DEXAMETHASONE SODIUM PHOSPHATE 4 MG/ML
6 INJECTION, SOLUTION INTRA-ARTICULAR; INTRALESIONAL; INTRAMUSCULAR; INTRAVENOUS; SOFT TISSUE EVERY 24 HOURS
Status: DISCONTINUED | OUTPATIENT
Start: 2021-01-01 | End: 2021-01-01

## 2021-01-01 RX ORDER — DEXMEDETOMIDINE HYDROCHLORIDE 4 UG/ML
.1-1.5 INJECTION, SOLUTION INTRAVENOUS
Status: DISCONTINUED | OUTPATIENT
Start: 2021-01-01 | End: 2021-01-01 | Stop reason: HOSPADM

## 2021-01-01 RX ORDER — FUROSEMIDE 10 MG/ML
40 INJECTION INTRAMUSCULAR; INTRAVENOUS ONCE
Status: COMPLETED | OUTPATIENT
Start: 2021-01-01 | End: 2021-01-01

## 2021-01-01 RX ORDER — ACETAMINOPHEN 650 MG/1
650 SUPPOSITORY RECTAL
Status: DISCONTINUED | OUTPATIENT
Start: 2021-01-01 | End: 2021-01-01

## 2021-01-01 RX ORDER — BUMETANIDE 0.25 MG/ML
1 INJECTION INTRAMUSCULAR; INTRAVENOUS EVERY 12 HOURS
Status: DISCONTINUED | OUTPATIENT
Start: 2021-01-01 | End: 2021-01-01

## 2021-01-01 RX ORDER — CODEINE PHOSPHATE AND GUAIFENESIN 10; 100 MG/5ML; MG/5ML
10 SOLUTION ORAL
Status: DISCONTINUED | OUTPATIENT
Start: 2021-01-01 | End: 2021-01-01

## 2021-01-01 RX ORDER — MIDAZOLAM HYDROCHLORIDE 1 MG/ML
INJECTION, SOLUTION INTRAMUSCULAR; INTRAVENOUS
Status: COMPLETED
Start: 2021-01-01 | End: 2021-01-01

## 2021-01-01 RX ORDER — SODIUM CHLORIDE 9 MG/ML
150 INJECTION, SOLUTION INTRAVENOUS CONTINUOUS
Status: DISCONTINUED | OUTPATIENT
Start: 2021-01-01 | End: 2021-01-01

## 2021-01-01 RX ORDER — SODIUM BICARBONATE 1 MEQ/ML
SYRINGE (ML) INTRAVENOUS
Status: DISCONTINUED
Start: 2021-01-01 | End: 2021-01-01 | Stop reason: HOSPADM

## 2021-01-01 RX ORDER — LORAZEPAM 2 MG/ML
2 INJECTION INTRAMUSCULAR ONCE
Status: DISCONTINUED | OUTPATIENT
Start: 2021-01-01 | End: 2021-01-01

## 2021-01-01 RX ORDER — PREDNISONE 50 MG/1
50 TABLET ORAL DAILY
Qty: 3 TABLET | Refills: 0 | Status: SHIPPED | OUTPATIENT
Start: 2021-01-01 | End: 2021-01-01

## 2021-01-01 RX ORDER — PHENYLEPHRINE HCL IN 0.9% NACL 0.4MG/10ML
SYRINGE (ML) INTRAVENOUS
Status: COMPLETED
Start: 2021-01-01 | End: 2021-01-01

## 2021-01-01 RX ORDER — INSULIN LISPRO 100 [IU]/ML
INJECTION, SOLUTION INTRAVENOUS; SUBCUTANEOUS EVERY 6 HOURS
Status: DISCONTINUED | OUTPATIENT
Start: 2021-01-01 | End: 2021-01-01

## 2021-01-01 RX ORDER — IPRATROPIUM BROMIDE AND ALBUTEROL SULFATE 2.5; .5 MG/3ML; MG/3ML
3 SOLUTION RESPIRATORY (INHALATION)
Status: DISCONTINUED | OUTPATIENT
Start: 2021-01-01 | End: 2021-01-01 | Stop reason: CLARIF

## 2021-01-01 RX ORDER — ALBUMIN HUMAN 250 G/1000ML
25 SOLUTION INTRAVENOUS EVERY 6 HOURS
Status: DISCONTINUED | OUTPATIENT
Start: 2021-01-01 | End: 2021-01-01 | Stop reason: HOSPADM

## 2021-01-01 RX ORDER — NOREPINEPHRINE BITARTRATE/D5W 8 MG/250ML
.5-16 PLASTIC BAG, INJECTION (ML) INTRAVENOUS
Status: DISCONTINUED | OUTPATIENT
Start: 2021-01-01 | End: 2021-01-01

## 2021-01-01 RX ORDER — INSULIN LISPRO 100 [IU]/ML
INJECTION, SOLUTION INTRAVENOUS; SUBCUTANEOUS EVERY 6 HOURS
Status: DISCONTINUED | OUTPATIENT
Start: 2021-01-01 | End: 2021-01-01 | Stop reason: HOSPADM

## 2021-01-01 RX ORDER — HYDRALAZINE HYDROCHLORIDE 20 MG/ML
20 INJECTION INTRAMUSCULAR; INTRAVENOUS
Status: DISCONTINUED | OUTPATIENT
Start: 2021-01-01 | End: 2021-01-01 | Stop reason: HOSPADM

## 2021-01-01 RX ORDER — ROCURONIUM BROMIDE 10 MG/ML
100 INJECTION, SOLUTION INTRAVENOUS
Status: COMPLETED | OUTPATIENT
Start: 2021-01-01 | End: 2021-01-01

## 2021-01-01 RX ORDER — SODIUM BICARBONATE IN D5W 150/1000ML
PLASTIC BAG, INJECTION (ML) INTRAVENOUS CONTINUOUS
Status: DISCONTINUED | OUTPATIENT
Start: 2021-01-01 | End: 2021-01-01

## 2021-01-01 RX ORDER — CHLORTHALIDONE 25 MG/1
25 TABLET ORAL DAILY
Status: DISCONTINUED | OUTPATIENT
Start: 2021-01-01 | End: 2021-01-01

## 2021-01-01 RX ORDER — DIAZEPAM 5 MG/1
5 TABLET ORAL EVERY 8 HOURS
Status: DISCONTINUED | OUTPATIENT
Start: 2021-01-01 | End: 2021-01-01

## 2021-01-01 RX ORDER — SODIUM CHLORIDE 0.9 % (FLUSH) 0.9 %
5-40 SYRINGE (ML) INJECTION AS NEEDED
Status: DISCONTINUED | OUTPATIENT
Start: 2021-01-01 | End: 2021-01-01 | Stop reason: HOSPADM

## 2021-01-01 RX ORDER — ROCURONIUM BROMIDE 10 MG/ML
0.6 INJECTION, SOLUTION INTRAVENOUS
Status: COMPLETED | OUTPATIENT
Start: 2021-01-01 | End: 2021-01-01

## 2021-01-01 RX ORDER — EPINEPHRINE 0.1 MG/ML
INJECTION INTRACARDIAC; INTRAVENOUS
Status: COMPLETED | OUTPATIENT
Start: 2021-01-01 | End: 2021-01-01

## 2021-01-01 RX ORDER — PROPOFOL 10 MG/ML
100 INJECTION, EMULSION INTRAVENOUS
Status: COMPLETED | OUTPATIENT
Start: 2021-01-01 | End: 2021-01-01

## 2021-01-01 RX ORDER — ONDANSETRON 4 MG/1
4 TABLET, ORALLY DISINTEGRATING ORAL
Status: DISCONTINUED | OUTPATIENT
Start: 2021-01-01 | End: 2021-01-01

## 2021-01-01 RX ORDER — MIDAZOLAM IN 0.9 % SOD.CHLORID 1 MG/ML
0-20 PLASTIC BAG, INJECTION (ML) INTRAVENOUS
Status: DISCONTINUED | OUTPATIENT
Start: 2021-01-01 | End: 2021-01-01 | Stop reason: HOSPADM

## 2021-01-01 RX ORDER — SODIUM POLYSTYRENE SULFONATE 15 G/60ML
60 SUSPENSION ORAL; RECTAL
Status: COMPLETED | OUTPATIENT
Start: 2021-01-01 | End: 2021-01-01

## 2021-01-01 RX ORDER — FENTANYL CITRATE 50 UG/ML
INJECTION, SOLUTION INTRAMUSCULAR; INTRAVENOUS
Status: DISPENSED
Start: 2021-01-01 | End: 2021-01-01

## 2021-01-01 RX ORDER — BALSAM PERU/CASTOR OIL
OINTMENT (GRAM) TOPICAL AS NEEDED
Status: DISCONTINUED | OUTPATIENT
Start: 2021-01-01 | End: 2021-01-01 | Stop reason: HOSPADM

## 2021-01-01 RX ORDER — POLYETHYLENE GLYCOL 3350 17 G/17G
17 POWDER, FOR SOLUTION ORAL DAILY PRN
Status: DISCONTINUED | OUTPATIENT
Start: 2021-01-01 | End: 2021-01-01 | Stop reason: HOSPADM

## 2021-01-01 RX ADMIN — INSULIN LISPRO 2 UNITS: 100 INJECTION, SOLUTION INTRAVENOUS; SUBCUTANEOUS at 18:03

## 2021-01-01 RX ADMIN — DEXMEDETOMIDINE HYDROCHLORIDE 0.4 MCG/KG/HR: 400 INJECTION INTRAVENOUS at 16:04

## 2021-01-01 RX ADMIN — ZINC SULFATE 220 MG (50 MG) CAPSULE 1 CAPSULE: CAPSULE at 09:21

## 2021-01-01 RX ADMIN — ENOXAPARIN SODIUM 40 MG: 100 INJECTION SUBCUTANEOUS at 14:42

## 2021-01-01 RX ADMIN — CISATRACURIUM BESYLATE 3 MCG/KG/MIN: 2 INJECTION INTRAVENOUS at 06:38

## 2021-01-01 RX ADMIN — ENOXAPARIN SODIUM 40 MG: 100 INJECTION SUBCUTANEOUS at 02:34

## 2021-01-01 RX ADMIN — DOCUSATE SODIUM 100 MG: 50 LIQUID ORAL at 08:35

## 2021-01-01 RX ADMIN — Medication 300 MCG/HR: at 12:54

## 2021-01-01 RX ADMIN — PROPOFOL 50 MCG/KG/MIN: 10 INJECTION, EMULSION INTRAVENOUS at 08:43

## 2021-01-01 RX ADMIN — DIAZEPAM 5 MG: 5 TABLET ORAL at 11:12

## 2021-01-01 RX ADMIN — DEXAMETHASONE SODIUM PHOSPHATE 6 MG: 4 INJECTION, SOLUTION INTRAMUSCULAR; INTRAVENOUS at 13:54

## 2021-01-01 RX ADMIN — Medication 10 ML: at 06:45

## 2021-01-01 RX ADMIN — METHYLPREDNISOLONE SODIUM SUCCINATE 20 MG: 40 INJECTION, POWDER, FOR SOLUTION INTRAMUSCULAR; INTRAVENOUS at 16:08

## 2021-01-01 RX ADMIN — CEFEPIME HYDROCHLORIDE 2 G: 2 INJECTION, POWDER, FOR SOLUTION INTRAVENOUS at 22:32

## 2021-01-01 RX ADMIN — PROPOFOL 50 MCG/KG/MIN: 10 INJECTION, EMULSION INTRAVENOUS at 21:37

## 2021-01-01 RX ADMIN — Medication 10 ML: at 06:20

## 2021-01-01 RX ADMIN — ENOXAPARIN SODIUM 40 MG: 100 INJECTION SUBCUTANEOUS at 03:31

## 2021-01-01 RX ADMIN — PROPOFOL 50 MCG/KG/MIN: 10 INJECTION, EMULSION INTRAVENOUS at 20:51

## 2021-01-01 RX ADMIN — Medication 300 MCG/HR: at 00:10

## 2021-01-01 RX ADMIN — HYDRALAZINE HYDROCHLORIDE 10 MG: 20 INJECTION INTRAMUSCULAR; INTRAVENOUS at 15:38

## 2021-01-01 RX ADMIN — GUAIFENESIN 1200 MG: 600 TABLET, EXTENDED RELEASE ORAL at 18:00

## 2021-01-01 RX ADMIN — SODIUM POLYSTYRENE SULFONATE 60 G: 15 SUSPENSION ORAL; RECTAL at 22:25

## 2021-01-01 RX ADMIN — DEXAMETHASONE SODIUM PHOSPHATE 6 MG: 4 INJECTION, SOLUTION INTRAMUSCULAR; INTRAVENOUS at 13:00

## 2021-01-01 RX ADMIN — SODIUM CHLORIDE 40 MG: 9 INJECTION, SOLUTION INTRAMUSCULAR; INTRAVENOUS; SUBCUTANEOUS at 16:49

## 2021-01-01 RX ADMIN — DIAZEPAM 20 MG: 5 TABLET ORAL at 04:00

## 2021-01-01 RX ADMIN — MIDAZOLAM 20 MG/HR: 5 INJECTION, SOLUTION INTRAMUSCULAR; INTRAVENOUS at 09:27

## 2021-01-01 RX ADMIN — Medication 3 MG: at 00:35

## 2021-01-01 RX ADMIN — CHLORTHALIDONE 25 MG: 25 TABLET ORAL at 09:00

## 2021-01-01 RX ADMIN — CISATRACURIUM BESYLATE 3 MCG/KG/MIN: 2 INJECTION INTRAVENOUS at 10:56

## 2021-01-01 RX ADMIN — DEXMEDETOMIDINE HYDROCHLORIDE 1.5 MCG/KG/HR: 400 INJECTION INTRAVENOUS at 22:19

## 2021-01-01 RX ADMIN — DEXMEDETOMIDINE HYDROCHLORIDE 1.5 MCG/KG/HR: 400 INJECTION INTRAVENOUS at 21:22

## 2021-01-01 RX ADMIN — PROPOFOL 50 MCG/KG/MIN: 10 INJECTION, EMULSION INTRAVENOUS at 09:09

## 2021-01-01 RX ADMIN — OXYCODONE 15 MG: 5 TABLET ORAL at 18:50

## 2021-01-01 RX ADMIN — BENZONATATE 200 MG: 100 CAPSULE ORAL at 16:31

## 2021-01-01 RX ADMIN — I.V. FAT EMULSION 500 ML: 20 EMULSION INTRAVENOUS at 21:25

## 2021-01-01 RX ADMIN — CISATRACURIUM BESYLATE 3 MCG/KG/MIN: 2 INJECTION INTRAVENOUS at 03:05

## 2021-01-01 RX ADMIN — Medication 17.2 MG: at 10:05

## 2021-01-01 RX ADMIN — DEXMEDETOMIDINE HYDROCHLORIDE 1.5 MCG/KG/HR: 400 INJECTION INTRAVENOUS at 12:14

## 2021-01-01 RX ADMIN — Medication 10 ML: at 13:27

## 2021-01-01 RX ADMIN — Medication 17.2 MG: at 08:43

## 2021-01-01 RX ADMIN — PROPOFOL 50 MCG/KG/MIN: 10 INJECTION, EMULSION INTRAVENOUS at 07:20

## 2021-01-01 RX ADMIN — METHYLPREDNISOLONE SODIUM SUCCINATE 40 MG: 40 INJECTION, POWDER, FOR SOLUTION INTRAMUSCULAR; INTRAVENOUS at 21:24

## 2021-01-01 RX ADMIN — Medication 10 ML: at 21:00

## 2021-01-01 RX ADMIN — MIDAZOLAM 20 MG/HR: 5 INJECTION, SOLUTION INTRAMUSCULAR; INTRAVENOUS at 22:30

## 2021-01-01 RX ADMIN — MIDAZOLAM HYDROCHLORIDE 5 MG: 1 INJECTION, SOLUTION INTRAMUSCULAR; INTRAVENOUS at 10:48

## 2021-01-01 RX ADMIN — CISATRACURIUM BESYLATE 3 MCG/KG/MIN: 2 INJECTION INTRAVENOUS at 11:03

## 2021-01-01 RX ADMIN — ACETAMINOPHEN 650 MG: 325 TABLET ORAL at 11:19

## 2021-01-01 RX ADMIN — DEXMEDETOMIDINE HYDROCHLORIDE 0.5 MCG/KG/HR: 400 INJECTION INTRAVENOUS at 00:15

## 2021-01-01 RX ADMIN — DEXMEDETOMIDINE HYDROCHLORIDE 0.5 MCG/KG/HR: 400 INJECTION INTRAVENOUS at 15:44

## 2021-01-01 RX ADMIN — ENOXAPARIN SODIUM 40 MG: 100 INJECTION SUBCUTANEOUS at 15:10

## 2021-01-01 RX ADMIN — HUMAN INSULIN 5 UNITS: 100 INJECTION, SUSPENSION SUBCUTANEOUS at 09:31

## 2021-01-01 RX ADMIN — METHYLPREDNISOLONE SODIUM SUCCINATE 20 MG: 40 INJECTION, POWDER, FOR SOLUTION INTRAMUSCULAR; INTRAVENOUS at 21:40

## 2021-01-01 RX ADMIN — DEXMEDETOMIDINE HYDROCHLORIDE 1.5 MCG/KG/HR: 400 INJECTION INTRAVENOUS at 20:07

## 2021-01-01 RX ADMIN — DEXMEDETOMIDINE HYDROCHLORIDE 1.5 MCG/KG/HR: 400 INJECTION INTRAVENOUS at 16:57

## 2021-01-01 RX ADMIN — METHYLPREDNISOLONE SODIUM SUCCINATE 40 MG: 40 INJECTION, POWDER, FOR SOLUTION INTRAMUSCULAR; INTRAVENOUS at 22:36

## 2021-01-01 RX ADMIN — MINERAL OIL AND PETROLATUM: 150; 830 OINTMENT OPHTHALMIC at 09:07

## 2021-01-01 RX ADMIN — DOCUSATE SODIUM 100 MG: 100 CAPSULE, LIQUID FILLED ORAL at 09:19

## 2021-01-01 RX ADMIN — DOCUSATE SODIUM 100 MG: 100 CAPSULE, LIQUID FILLED ORAL at 09:16

## 2021-01-01 RX ADMIN — Medication 10 ML: at 22:00

## 2021-01-01 RX ADMIN — GUAIFENESIN AND CODEINE PHOSPHATE 10 ML: 100; 10 SOLUTION ORAL at 16:37

## 2021-01-01 RX ADMIN — PROPOFOL 50 MCG/KG/MIN: 10 INJECTION, EMULSION INTRAVENOUS at 00:50

## 2021-01-01 RX ADMIN — Medication 10 ML: at 14:00

## 2021-01-01 RX ADMIN — ENOXAPARIN SODIUM 40 MG: 100 INJECTION SUBCUTANEOUS at 03:32

## 2021-01-01 RX ADMIN — OXYCODONE HYDROCHLORIDE AND ACETAMINOPHEN 500 MG: 500 TABLET ORAL at 21:02

## 2021-01-01 RX ADMIN — SODIUM CHLORIDE 150 ML/HR: 9 INJECTION, SOLUTION INTRAVENOUS at 22:51

## 2021-01-01 RX ADMIN — ROCURONIUM BROMIDE 50 MG: 50 INJECTION, SOLUTION INTRAVENOUS at 22:11

## 2021-01-01 RX ADMIN — INSULIN LISPRO 2 UNITS: 100 INJECTION, SOLUTION INTRAVENOUS; SUBCUTANEOUS at 12:00

## 2021-01-01 RX ADMIN — DEXMEDETOMIDINE HYDROCHLORIDE 1.5 MCG/KG/HR: 400 INJECTION INTRAVENOUS at 04:20

## 2021-01-01 RX ADMIN — Medication 17.2 MG: at 09:21

## 2021-01-01 RX ADMIN — Medication 2000 UNITS: at 09:27

## 2021-01-01 RX ADMIN — CEFEPIME HYDROCHLORIDE 2 G: 2 INJECTION, POWDER, FOR SOLUTION INTRAVENOUS at 08:35

## 2021-01-01 RX ADMIN — CHLORTHALIDONE 25 MG: 25 TABLET ORAL at 09:32

## 2021-01-01 RX ADMIN — INSULIN LISPRO 3 UNITS: 100 INJECTION, SOLUTION INTRAVENOUS; SUBCUTANEOUS at 05:38

## 2021-01-01 RX ADMIN — MINERAL OIL AND PETROLATUM: 150; 830 OINTMENT OPHTHALMIC at 08:22

## 2021-01-01 RX ADMIN — MINERAL OIL AND PETROLATUM: 150; 830 OINTMENT OPHTHALMIC at 20:52

## 2021-01-01 RX ADMIN — FAMOTIDINE 20 MG: 10 INJECTION, SOLUTION INTRAVENOUS at 08:35

## 2021-01-01 RX ADMIN — DEXMEDETOMIDINE HYDROCHLORIDE 1.5 MCG/KG/HR: 400 INJECTION INTRAVENOUS at 14:36

## 2021-01-01 RX ADMIN — DIAZEPAM 5 MG: 5 TABLET ORAL at 18:34

## 2021-01-01 RX ADMIN — INSULIN LISPRO 2 UNITS: 100 INJECTION, SOLUTION INTRAVENOUS; SUBCUTANEOUS at 06:07

## 2021-01-01 RX ADMIN — PROPOFOL 50 MCG/KG/MIN: 10 INJECTION, EMULSION INTRAVENOUS at 03:43

## 2021-01-01 RX ADMIN — FENTANYL CITRATE 100 MCG: 50 INJECTION, SOLUTION INTRAMUSCULAR; INTRAVENOUS at 07:13

## 2021-01-01 RX ADMIN — GUAIFENESIN 1200 MG: 600 TABLET, EXTENDED RELEASE ORAL at 09:20

## 2021-01-01 RX ADMIN — HYDRALAZINE HYDROCHLORIDE 20 MG: 20 INJECTION INTRAMUSCULAR; INTRAVENOUS at 13:55

## 2021-01-01 RX ADMIN — CHLORHEXIDINE GLUCONATE 15 ML: 0.12 RINSE ORAL at 09:07

## 2021-01-01 RX ADMIN — Medication 300 MCG/HR: at 09:19

## 2021-01-01 RX ADMIN — DEXMEDETOMIDINE HYDROCHLORIDE 1.5 MCG/KG/HR: 400 INJECTION INTRAVENOUS at 23:52

## 2021-01-01 RX ADMIN — BENZONATATE 200 MG: 100 CAPSULE ORAL at 12:07

## 2021-01-01 RX ADMIN — MIDAZOLAM 20 MG/HR: 5 INJECTION, SOLUTION INTRAMUSCULAR; INTRAVENOUS at 18:44

## 2021-01-01 RX ADMIN — DEXMEDETOMIDINE HYDROCHLORIDE 1.5 MCG/KG/HR: 400 INJECTION INTRAVENOUS at 05:27

## 2021-01-01 RX ADMIN — CEFEPIME HYDROCHLORIDE 2 G: 2 INJECTION, POWDER, FOR SOLUTION INTRAVENOUS at 21:01

## 2021-01-01 RX ADMIN — Medication 17.2 MG: at 09:15

## 2021-01-01 RX ADMIN — FUROSEMIDE 20 MG: 10 INJECTION, SOLUTION INTRAMUSCULAR; INTRAVENOUS at 10:26

## 2021-01-01 RX ADMIN — FAMOTIDINE 20 MG: 10 INJECTION, SOLUTION INTRAVENOUS at 08:22

## 2021-01-01 RX ADMIN — Medication 10 ML: at 22:51

## 2021-01-01 RX ADMIN — HUMAN INSULIN 5 UNITS: 100 INJECTION, SUSPENSION SUBCUTANEOUS at 22:33

## 2021-01-01 RX ADMIN — Medication 300 MCG/HR: at 13:25

## 2021-01-01 RX ADMIN — ENOXAPARIN SODIUM 40 MG: 100 INJECTION SUBCUTANEOUS at 16:08

## 2021-01-01 RX ADMIN — CEFEPIME HYDROCHLORIDE 2 G: 2 INJECTION, POWDER, FOR SOLUTION INTRAVENOUS at 09:03

## 2021-01-01 RX ADMIN — ENOXAPARIN SODIUM 40 MG: 100 INJECTION SUBCUTANEOUS at 03:02

## 2021-01-01 RX ADMIN — DEXMEDETOMIDINE HYDROCHLORIDE 1.5 MCG/KG/HR: 400 INJECTION INTRAVENOUS at 05:14

## 2021-01-01 RX ADMIN — CISATRACURIUM BESYLATE 3 MCG/KG/MIN: 2 INJECTION INTRAVENOUS at 20:57

## 2021-01-01 RX ADMIN — PROPOFOL 50 MCG/KG/MIN: 10 INJECTION, EMULSION INTRAVENOUS at 21:22

## 2021-01-01 RX ADMIN — CISATRACURIUM BESYLATE 2.5 MCG/KG/MIN: 2 INJECTION INTRAVENOUS at 19:07

## 2021-01-01 RX ADMIN — DEXMEDETOMIDINE HYDROCHLORIDE 1.5 MCG/KG/HR: 400 INJECTION INTRAVENOUS at 01:50

## 2021-01-01 RX ADMIN — DEXMEDETOMIDINE HYDROCHLORIDE 1.5 MCG/KG/HR: 400 INJECTION INTRAVENOUS at 16:52

## 2021-01-01 RX ADMIN — PROPOFOL 50 MCG/KG/MIN: 10 INJECTION, EMULSION INTRAVENOUS at 23:51

## 2021-01-01 RX ADMIN — ENOXAPARIN SODIUM 40 MG: 100 INJECTION SUBCUTANEOUS at 14:09

## 2021-01-01 RX ADMIN — GUAIFENESIN AND CODEINE PHOSPHATE 10 ML: 100; 10 SOLUTION ORAL at 04:46

## 2021-01-01 RX ADMIN — FAMOTIDINE 20 MG: 10 INJECTION, SOLUTION INTRAVENOUS at 20:20

## 2021-01-01 RX ADMIN — VANCOMYCIN HYDROCHLORIDE 2500 MG: 10 INJECTION, POWDER, LYOPHILIZED, FOR SOLUTION INTRAVENOUS at 16:02

## 2021-01-01 RX ADMIN — PROPOFOL 50 MCG/KG/MIN: 10 INJECTION, EMULSION INTRAVENOUS at 21:07

## 2021-01-01 RX ADMIN — PROPOFOL 50 MCG/KG/MIN: 10 INJECTION, EMULSION INTRAVENOUS at 23:44

## 2021-01-01 RX ADMIN — MIDAZOLAM 12 MG/HR: 5 INJECTION, SOLUTION INTRAMUSCULAR; INTRAVENOUS at 21:00

## 2021-01-01 RX ADMIN — KETOROLAC TROMETHAMINE 15 MG: 30 INJECTION, SOLUTION INTRAMUSCULAR; INTRAVENOUS at 06:17

## 2021-01-01 RX ADMIN — PROPOFOL 50 MCG/KG/MIN: 10 INJECTION, EMULSION INTRAVENOUS at 00:39

## 2021-01-01 RX ADMIN — CHLORHEXIDINE GLUCONATE 15 ML: 0.12 RINSE ORAL at 20:05

## 2021-01-01 RX ADMIN — DEXMEDETOMIDINE HYDROCHLORIDE 1.5 MCG/KG/HR: 400 INJECTION INTRAVENOUS at 18:28

## 2021-01-01 RX ADMIN — SODIUM CHLORIDE 40 MG: 9 INJECTION, SOLUTION INTRAMUSCULAR; INTRAVENOUS; SUBCUTANEOUS at 09:21

## 2021-01-01 RX ADMIN — DEXMEDETOMIDINE HYDROCHLORIDE 0.4 MCG/KG/HR: 400 INJECTION INTRAVENOUS at 07:09

## 2021-01-01 RX ADMIN — INSULIN LISPRO 2 UNITS: 100 INJECTION, SOLUTION INTRAVENOUS; SUBCUTANEOUS at 11:14

## 2021-01-01 RX ADMIN — DEXTROSE MONOHYDRATE 25 G: 25 INJECTION, SOLUTION INTRAVENOUS at 20:49

## 2021-01-01 RX ADMIN — Medication 300 MCG/HR: at 20:18

## 2021-01-01 RX ADMIN — INSULIN LISPRO 3 UNITS: 100 INJECTION, SOLUTION INTRAVENOUS; SUBCUTANEOUS at 12:33

## 2021-01-01 RX ADMIN — METHYLPREDNISOLONE SODIUM SUCCINATE 125 MG: 125 INJECTION, POWDER, FOR SOLUTION INTRAMUSCULAR; INTRAVENOUS at 21:23

## 2021-01-01 RX ADMIN — ALBUMIN (HUMAN) 25 G: 0.25 INJECTION, SOLUTION INTRAVENOUS at 12:05

## 2021-01-01 RX ADMIN — METHYLPREDNISOLONE SODIUM SUCCINATE 40 MG: 40 INJECTION, POWDER, FOR SOLUTION INTRAMUSCULAR; INTRAVENOUS at 04:44

## 2021-01-01 RX ADMIN — VANCOMYCIN HYDROCHLORIDE 1250 MG: 10 INJECTION, POWDER, LYOPHILIZED, FOR SOLUTION INTRAVENOUS at 15:52

## 2021-01-01 RX ADMIN — Medication 300 MCG/HR: at 00:17

## 2021-01-01 RX ADMIN — GUAIFENESIN AND CODEINE PHOSPHATE 10 ML: 100; 10 SOLUTION ORAL at 07:06

## 2021-01-01 RX ADMIN — OXYCODONE HYDROCHLORIDE AND ACETAMINOPHEN 500 MG: 500 TABLET ORAL at 09:16

## 2021-01-01 RX ADMIN — CISATRACURIUM BESYLATE 5 MCG/KG/MIN: 2 INJECTION INTRAVENOUS at 06:02

## 2021-01-01 RX ADMIN — TOCILIZUMAB 810 MG: 180 INJECTION, SOLUTION SUBCUTANEOUS at 15:38

## 2021-01-01 RX ADMIN — ZINC SULFATE 220 MG (50 MG) CAPSULE 1 CAPSULE: CAPSULE at 08:45

## 2021-01-01 RX ADMIN — METHYLPREDNISOLONE SODIUM SUCCINATE 40 MG: 40 INJECTION, POWDER, FOR SOLUTION INTRAMUSCULAR; INTRAVENOUS at 21:46

## 2021-01-01 RX ADMIN — OXYCODONE HYDROCHLORIDE AND ACETAMINOPHEN 500 MG: 500 TABLET ORAL at 08:36

## 2021-01-01 RX ADMIN — ROCURONIUM BROMIDE 50 MG: 10 INJECTION, SOLUTION INTRAVENOUS at 10:51

## 2021-01-01 RX ADMIN — CHLORTHALIDONE 25 MG: 25 TABLET ORAL at 08:01

## 2021-01-01 RX ADMIN — ENOXAPARIN SODIUM 40 MG: 100 INJECTION SUBCUTANEOUS at 16:49

## 2021-01-01 RX ADMIN — METHYLPREDNISOLONE SODIUM SUCCINATE 40 MG: 40 INJECTION, POWDER, FOR SOLUTION INTRAMUSCULAR; INTRAVENOUS at 04:05

## 2021-01-01 RX ADMIN — METHYLPREDNISOLONE SODIUM SUCCINATE 40 MG: 40 INJECTION, POWDER, FOR SOLUTION INTRAMUSCULAR; INTRAVENOUS at 15:10

## 2021-01-01 RX ADMIN — Medication 300 MCG/HR: at 18:36

## 2021-01-01 RX ADMIN — Medication 50 MCG/HR: at 07:24

## 2021-01-01 RX ADMIN — DEXTROMETHORPHAN 30 MG: 30 SUSPENSION, EXTENDED RELEASE ORAL at 21:09

## 2021-01-01 RX ADMIN — Medication 300 MCG/HR: at 18:00

## 2021-01-01 RX ADMIN — OXYCODONE 15 MG: 5 TABLET ORAL at 10:57

## 2021-01-01 RX ADMIN — MIDAZOLAM 20 MG/HR: 5 INJECTION, SOLUTION INTRAMUSCULAR; INTRAVENOUS at 22:00

## 2021-01-01 RX ADMIN — DOCUSATE SODIUM 100 MG: 100 CAPSULE, LIQUID FILLED ORAL at 09:27

## 2021-01-01 RX ADMIN — Medication 10 ML: at 05:40

## 2021-01-01 RX ADMIN — MIDAZOLAM 20 MG/HR: 5 INJECTION, SOLUTION INTRAMUSCULAR; INTRAVENOUS at 11:43

## 2021-01-01 RX ADMIN — MIDAZOLAM 20 MG/HR: 5 INJECTION, SOLUTION INTRAMUSCULAR; INTRAVENOUS at 00:45

## 2021-01-01 RX ADMIN — Medication: at 14:10

## 2021-01-01 RX ADMIN — MIDAZOLAM 20 MG/HR: 5 INJECTION, SOLUTION INTRAMUSCULAR; INTRAVENOUS at 15:20

## 2021-01-01 RX ADMIN — Medication 10 ML: at 14:09

## 2021-01-01 RX ADMIN — METHYLPREDNISOLONE SODIUM SUCCINATE 40 MG: 40 INJECTION, POWDER, FOR SOLUTION INTRAMUSCULAR; INTRAVENOUS at 15:01

## 2021-01-01 RX ADMIN — MINERAL OIL AND PETROLATUM: 150; 830 OINTMENT OPHTHALMIC at 20:18

## 2021-01-01 RX ADMIN — PROPOFOL 50 MCG/KG/MIN: 10 INJECTION, EMULSION INTRAVENOUS at 14:05

## 2021-01-01 RX ADMIN — MINERAL OIL AND PETROLATUM: 150; 830 OINTMENT OPHTHALMIC at 20:34

## 2021-01-01 RX ADMIN — MIDAZOLAM 20 MG/HR: 5 INJECTION, SOLUTION INTRAMUSCULAR; INTRAVENOUS at 07:48

## 2021-01-01 RX ADMIN — ENOXAPARIN SODIUM 40 MG: 100 INJECTION SUBCUTANEOUS at 06:46

## 2021-01-01 RX ADMIN — MIDAZOLAM 20 MG/HR: 5 INJECTION, SOLUTION INTRAMUSCULAR; INTRAVENOUS at 20:31

## 2021-01-01 RX ADMIN — DEXMEDETOMIDINE HYDROCHLORIDE 1.5 MCG/KG/HR: 400 INJECTION INTRAVENOUS at 22:20

## 2021-01-01 RX ADMIN — DEXMEDETOMIDINE HYDROCHLORIDE 0.5 MCG/KG/HR: 400 INJECTION INTRAVENOUS at 08:34

## 2021-01-01 RX ADMIN — Medication 5 ML: at 14:26

## 2021-01-01 RX ADMIN — PROPOFOL 50 MCG/KG/MIN: 10 INJECTION, EMULSION INTRAVENOUS at 18:15

## 2021-01-01 RX ADMIN — ROCURONIUM BROMIDE 50 MG: 50 INJECTION, SOLUTION INTRAVENOUS at 22:10

## 2021-01-01 RX ADMIN — OXYCODONE HYDROCHLORIDE AND ACETAMINOPHEN 500 MG: 500 TABLET ORAL at 09:27

## 2021-01-01 RX ADMIN — DIAZEPAM 20 MG: 5 TABLET ORAL at 18:08

## 2021-01-01 RX ADMIN — DEXMEDETOMIDINE HYDROCHLORIDE 1.5 MCG/KG/HR: 400 INJECTION INTRAVENOUS at 06:56

## 2021-01-01 RX ADMIN — METHYLPREDNISOLONE SODIUM SUCCINATE 20 MG: 40 INJECTION, POWDER, FOR SOLUTION INTRAMUSCULAR; INTRAVENOUS at 04:00

## 2021-01-01 RX ADMIN — CISATRACURIUM BESYLATE 3 MCG/KG/MIN: 2 INJECTION INTRAVENOUS at 04:45

## 2021-01-01 RX ADMIN — MIDAZOLAM 20 MG/HR: 5 INJECTION, SOLUTION INTRAMUSCULAR; INTRAVENOUS at 00:10

## 2021-01-01 RX ADMIN — Medication 300 MCG/HR: at 06:00

## 2021-01-01 RX ADMIN — PROPOFOL 50 MCG/KG/MIN: 10 INJECTION, EMULSION INTRAVENOUS at 14:44

## 2021-01-01 RX ADMIN — BENZONATATE 200 MG: 100 CAPSULE ORAL at 10:26

## 2021-01-01 RX ADMIN — DEXMEDETOMIDINE HYDROCHLORIDE 1.5 MCG/KG/HR: 400 INJECTION INTRAVENOUS at 21:58

## 2021-01-01 RX ADMIN — ENOXAPARIN SODIUM 40 MG: 100 INJECTION SUBCUTANEOUS at 15:03

## 2021-01-01 RX ADMIN — Medication 17.2 MG: at 09:12

## 2021-01-01 RX ADMIN — GUAIFENESIN 1200 MG: 600 TABLET, EXTENDED RELEASE ORAL at 17:50

## 2021-01-01 RX ADMIN — METHYLPREDNISOLONE SODIUM SUCCINATE 20 MG: 40 INJECTION, POWDER, FOR SOLUTION INTRAMUSCULAR; INTRAVENOUS at 09:15

## 2021-01-01 RX ADMIN — DEXMEDETOMIDINE HYDROCHLORIDE 1.5 MCG/KG/HR: 400 INJECTION INTRAVENOUS at 14:14

## 2021-01-01 RX ADMIN — DOCUSATE SODIUM 100 MG: 50 LIQUID ORAL at 09:24

## 2021-01-01 RX ADMIN — CISATRACURIUM BESYLATE 3 MCG/KG/MIN: 2 INJECTION INTRAVENOUS at 00:45

## 2021-01-01 RX ADMIN — PROPOFOL 50 MCG/KG/MIN: 10 INJECTION, EMULSION INTRAVENOUS at 12:14

## 2021-01-01 RX ADMIN — METHYLPREDNISOLONE SODIUM SUCCINATE 20 MG: 40 INJECTION, POWDER, FOR SOLUTION INTRAMUSCULAR; INTRAVENOUS at 06:50

## 2021-01-01 RX ADMIN — DEXMEDETOMIDINE HYDROCHLORIDE 1.5 MCG/KG/HR: 400 INJECTION INTRAVENOUS at 06:14

## 2021-01-01 RX ADMIN — SODIUM CHLORIDE 40 MG: 9 INJECTION, SOLUTION INTRAMUSCULAR; INTRAVENOUS; SUBCUTANEOUS at 09:08

## 2021-01-01 RX ADMIN — MINERAL OIL AND PETROLATUM: 150; 830 OINTMENT OPHTHALMIC at 20:14

## 2021-01-01 RX ADMIN — DEXMEDETOMIDINE HYDROCHLORIDE 1.5 MCG/KG/HR: 400 INJECTION INTRAVENOUS at 06:30

## 2021-01-01 RX ADMIN — Medication 300 MCG/HR: at 05:20

## 2021-01-01 RX ADMIN — ZINC SULFATE 220 MG (50 MG) CAPSULE 1 CAPSULE: CAPSULE at 09:58

## 2021-01-01 RX ADMIN — GUAIFENESIN AND CODEINE PHOSPHATE 10 ML: 100; 10 SOLUTION ORAL at 09:39

## 2021-01-01 RX ADMIN — Medication 5 ML: at 06:00

## 2021-01-01 RX ADMIN — INSULIN LISPRO 2 UNITS: 100 INJECTION, SOLUTION INTRAVENOUS; SUBCUTANEOUS at 23:23

## 2021-01-01 RX ADMIN — ENOXAPARIN SODIUM 40 MG: 100 INJECTION SUBCUTANEOUS at 04:04

## 2021-01-01 RX ADMIN — CHLORHEXIDINE GLUCONATE 15 ML: 0.12 RINSE ORAL at 21:00

## 2021-01-01 RX ADMIN — ROCURONIUM BROMIDE 50 MG: 50 INJECTION, SOLUTION INTRAVENOUS at 10:51

## 2021-01-01 RX ADMIN — PROPOFOL 50 MCG/KG/MIN: 10 INJECTION, EMULSION INTRAVENOUS at 17:48

## 2021-01-01 RX ADMIN — GUAIFENESIN AND CODEINE PHOSPHATE 10 ML: 100; 10 SOLUTION ORAL at 19:35

## 2021-01-01 RX ADMIN — METHYLPREDNISOLONE SODIUM SUCCINATE 40 MG: 40 INJECTION, POWDER, FOR SOLUTION INTRAMUSCULAR; INTRAVENOUS at 21:39

## 2021-01-01 RX ADMIN — DEXMEDETOMIDINE HYDROCHLORIDE 1.5 MCG/KG/HR: 400 INJECTION INTRAVENOUS at 23:04

## 2021-01-01 RX ADMIN — DEXMEDETOMIDINE HYDROCHLORIDE 1.5 MCG/KG/HR: 400 INJECTION INTRAVENOUS at 19:14

## 2021-01-01 RX ADMIN — GUAIFENESIN 1200 MG: 600 TABLET, EXTENDED RELEASE ORAL at 17:38

## 2021-01-01 RX ADMIN — CHLORTHALIDONE 25 MG: 25 TABLET ORAL at 08:43

## 2021-01-01 RX ADMIN — OXYCODONE HYDROCHLORIDE AND ACETAMINOPHEN 500 MG: 500 TABLET ORAL at 22:50

## 2021-01-01 RX ADMIN — DEXMEDETOMIDINE HYDROCHLORIDE 1.5 MCG/KG/HR: 400 INJECTION INTRAVENOUS at 11:32

## 2021-01-01 RX ADMIN — SODIUM BICARBONATE 50 MEQ: 84 INJECTION, SOLUTION INTRAVENOUS at 12:24

## 2021-01-01 RX ADMIN — ASCORBIC ACID, VITAMIN A PALMITATE, CHOLECALCIFEROL, THIAMINE HYDROCHLORIDE, RIBOFLAVIN-5 PHOSPHATE SODIUM, PYRIDOXINE HYDROCHLORIDE, NIACINAMIDE, DEXPANTHENOL, ALPHA-TOCOPHEROL ACETATE, VITAMIN K1, FOLIC ACID, BIOTIN, CYANOCOBALAMIN: 200; 3300; 200; 6; 3.6; 6; 40; 15; 10; 150; 600; 60; 5 INJECTION, SOLUTION INTRAVENOUS at 18:01

## 2021-01-01 RX ADMIN — METHYLPREDNISOLONE SODIUM SUCCINATE 20 MG: 40 INJECTION, POWDER, FOR SOLUTION INTRAMUSCULAR; INTRAVENOUS at 09:32

## 2021-01-01 RX ADMIN — Medication 10 ML: at 14:32

## 2021-01-01 RX ADMIN — DEXMEDETOMIDINE HYDROCHLORIDE 1.5 MCG/KG/HR: 400 INJECTION INTRAVENOUS at 13:43

## 2021-01-01 RX ADMIN — METHYLPREDNISOLONE SODIUM SUCCINATE 40 MG: 40 INJECTION, POWDER, FOR SOLUTION INTRAMUSCULAR; INTRAVENOUS at 15:43

## 2021-01-01 RX ADMIN — CHLORHEXIDINE GLUCONATE 15 ML: 0.12 RINSE ORAL at 20:46

## 2021-01-01 RX ADMIN — Medication 300 MCG/HR: at 04:10

## 2021-01-01 RX ADMIN — DOCUSATE SODIUM 100 MG: 100 CAPSULE, LIQUID FILLED ORAL at 09:12

## 2021-01-01 RX ADMIN — DEXMEDETOMIDINE HYDROCHLORIDE 0.5 MCG/KG/HR: 400 INJECTION INTRAVENOUS at 00:28

## 2021-01-01 RX ADMIN — Medication 200 MCG/HR: at 14:00

## 2021-01-01 RX ADMIN — MIDAZOLAM 20 MG/HR: 5 INJECTION, SOLUTION INTRAMUSCULAR; INTRAVENOUS at 04:00

## 2021-01-01 RX ADMIN — CHLORHEXIDINE GLUCONATE 15 ML: 0.12 RINSE ORAL at 20:33

## 2021-01-01 RX ADMIN — PROPOFOL 50 MCG/KG/MIN: 10 INJECTION, EMULSION INTRAVENOUS at 23:34

## 2021-01-01 RX ADMIN — HUMAN INSULIN 5 UNITS: 100 INJECTION, SUSPENSION SUBCUTANEOUS at 09:02

## 2021-01-01 RX ADMIN — ACETAMINOPHEN 650 MG: 325 TABLET ORAL at 20:55

## 2021-01-01 RX ADMIN — IOPAMIDOL 80 ML: 755 INJECTION, SOLUTION INTRAVENOUS at 11:18

## 2021-01-01 RX ADMIN — Medication 300 MCG/HR: at 23:20

## 2021-01-01 RX ADMIN — PROPOFOL 50 MCG/KG/MIN: 10 INJECTION, EMULSION INTRAVENOUS at 20:47

## 2021-01-01 RX ADMIN — INSULIN LISPRO 2 UNITS: 100 INJECTION, SOLUTION INTRAVENOUS; SUBCUTANEOUS at 06:25

## 2021-01-01 RX ADMIN — DEXMEDETOMIDINE HYDROCHLORIDE 1.5 MCG/KG/HR: 400 INJECTION INTRAVENOUS at 20:54

## 2021-01-01 RX ADMIN — METHYLPREDNISOLONE SODIUM SUCCINATE 40 MG: 40 INJECTION, POWDER, FOR SOLUTION INTRAMUSCULAR; INTRAVENOUS at 04:41

## 2021-01-01 RX ADMIN — DEXMEDETOMIDINE HYDROCHLORIDE 1.5 MCG/KG/HR: 400 INJECTION INTRAVENOUS at 10:45

## 2021-01-01 RX ADMIN — METHYLPREDNISOLONE SODIUM SUCCINATE 40 MG: 40 INJECTION, POWDER, FOR SOLUTION INTRAMUSCULAR; INTRAVENOUS at 21:01

## 2021-01-01 RX ADMIN — METHYLPREDNISOLONE SODIUM SUCCINATE 20 MG: 40 INJECTION, POWDER, FOR SOLUTION INTRAMUSCULAR; INTRAVENOUS at 16:52

## 2021-01-01 RX ADMIN — METHYLPREDNISOLONE SODIUM SUCCINATE 40 MG: 40 INJECTION, POWDER, FOR SOLUTION INTRAMUSCULAR; INTRAVENOUS at 04:00

## 2021-01-01 RX ADMIN — Medication 10 ML: at 21:10

## 2021-01-01 RX ADMIN — DIAZEPAM 20 MG: 5 TABLET ORAL at 11:12

## 2021-01-01 RX ADMIN — Medication 2000 UNITS: at 10:05

## 2021-01-01 RX ADMIN — GUAIFENESIN AND CODEINE PHOSPHATE 10 ML: 100; 10 SOLUTION ORAL at 18:00

## 2021-01-01 RX ADMIN — ZINC SULFATE 220 MG (50 MG) CAPSULE 1 CAPSULE: CAPSULE at 09:27

## 2021-01-01 RX ADMIN — BENZONATATE 200 MG: 100 CAPSULE ORAL at 18:43

## 2021-01-01 RX ADMIN — FUROSEMIDE 40 MG: 10 INJECTION, SOLUTION INTRAMUSCULAR; INTRAVENOUS at 11:21

## 2021-01-01 RX ADMIN — OXYCODONE HYDROCHLORIDE AND ACETAMINOPHEN 500 MG: 500 TABLET ORAL at 09:20

## 2021-01-01 RX ADMIN — VASOPRESSIN 0.04 UNITS/MIN: 20 INJECTION INTRAVENOUS at 03:55

## 2021-01-01 RX ADMIN — DEXMEDETOMIDINE HYDROCHLORIDE 1.5 MCG/KG/HR: 400 INJECTION INTRAVENOUS at 16:12

## 2021-01-01 RX ADMIN — METHYLPREDNISOLONE SODIUM SUCCINATE 40 MG: 40 INJECTION, POWDER, FOR SOLUTION INTRAMUSCULAR; INTRAVENOUS at 15:09

## 2021-01-01 RX ADMIN — PROPOFOL 50 MCG/KG/MIN: 10 INJECTION, EMULSION INTRAVENOUS at 02:08

## 2021-01-01 RX ADMIN — GUAIFENESIN AND CODEINE PHOSPHATE 10 ML: 100; 10 SOLUTION ORAL at 00:15

## 2021-01-01 RX ADMIN — Medication 10 ML: at 06:00

## 2021-01-01 RX ADMIN — Medication 300 MCG/HR: at 00:15

## 2021-01-01 RX ADMIN — SODIUM CHLORIDE 1000 ML: 900 INJECTION, SOLUTION INTRAVENOUS at 11:00

## 2021-01-01 RX ADMIN — PROPOFOL 50 MCG/KG/MIN: 10 INJECTION, EMULSION INTRAVENOUS at 07:44

## 2021-01-01 RX ADMIN — DEXAMETHASONE SODIUM PHOSPHATE 6 MG: 4 INJECTION, SOLUTION INTRAMUSCULAR; INTRAVENOUS at 12:29

## 2021-01-01 RX ADMIN — PROPOFOL 50 MCG/KG/MIN: 10 INJECTION, EMULSION INTRAVENOUS at 01:38

## 2021-01-01 RX ADMIN — MIDAZOLAM 20 MG/HR: 5 INJECTION, SOLUTION INTRAMUSCULAR; INTRAVENOUS at 19:12

## 2021-01-01 RX ADMIN — PROPOFOL 50 MCG/KG/MIN: 10 INJECTION, EMULSION INTRAVENOUS at 12:47

## 2021-01-01 RX ADMIN — DIAZEPAM 5 MG: 5 TABLET ORAL at 02:06

## 2021-01-01 RX ADMIN — METHYLPREDNISOLONE SODIUM SUCCINATE 40 MG: 40 INJECTION, POWDER, FOR SOLUTION INTRAMUSCULAR; INTRAVENOUS at 09:58

## 2021-01-01 RX ADMIN — ROCURONIUM BROMIDE 100 MG: 50 INJECTION, SOLUTION INTRAVENOUS at 06:02

## 2021-01-01 RX ADMIN — METHYLPREDNISOLONE SODIUM SUCCINATE 40 MG: 40 INJECTION, POWDER, FOR SOLUTION INTRAMUSCULAR; INTRAVENOUS at 15:37

## 2021-01-01 RX ADMIN — MIDAZOLAM 20 MG/HR: 5 INJECTION, SOLUTION INTRAMUSCULAR; INTRAVENOUS at 11:54

## 2021-01-01 RX ADMIN — ENOXAPARIN SODIUM 40 MG: 100 INJECTION SUBCUTANEOUS at 14:32

## 2021-01-01 RX ADMIN — DEXMEDETOMIDINE HYDROCHLORIDE 1.5 MCG/KG/HR: 400 INJECTION INTRAVENOUS at 23:34

## 2021-01-01 RX ADMIN — CHLORTHALIDONE 25 MG: 25 TABLET ORAL at 09:28

## 2021-01-01 RX ADMIN — CEFEPIME HYDROCHLORIDE 2 G: 2 INJECTION, POWDER, FOR SOLUTION INTRAVENOUS at 00:40

## 2021-01-01 RX ADMIN — ENOXAPARIN SODIUM 40 MG: 100 INJECTION SUBCUTANEOUS at 13:15

## 2021-01-01 RX ADMIN — ENOXAPARIN SODIUM 40 MG: 100 INJECTION SUBCUTANEOUS at 12:07

## 2021-01-01 RX ADMIN — BENZONATATE 200 MG: 100 CAPSULE ORAL at 13:14

## 2021-01-01 RX ADMIN — METHYLPREDNISOLONE SODIUM SUCCINATE 40 MG: 40 INJECTION, POWDER, FOR SOLUTION INTRAMUSCULAR; INTRAVENOUS at 09:20

## 2021-01-01 RX ADMIN — Medication 300 MCG/HR: at 22:26

## 2021-01-01 RX ADMIN — BENZONATATE 100 MG: 100 CAPSULE ORAL at 22:51

## 2021-01-01 RX ADMIN — CHLORHEXIDINE GLUCONATE 15 ML: 0.12 RINSE ORAL at 09:15

## 2021-01-01 RX ADMIN — BENZONATATE 200 MG: 100 CAPSULE ORAL at 09:12

## 2021-01-01 RX ADMIN — INSULIN LISPRO 2 UNITS: 100 INJECTION, SOLUTION INTRAVENOUS; SUBCUTANEOUS at 18:33

## 2021-01-01 RX ADMIN — ENOXAPARIN SODIUM 40 MG: 100 INJECTION SUBCUTANEOUS at 15:52

## 2021-01-01 RX ADMIN — PROPOFOL 50 MCG/KG/MIN: 10 INJECTION, EMULSION INTRAVENOUS at 20:58

## 2021-01-01 RX ADMIN — METHYLPREDNISOLONE SODIUM SUCCINATE 20 MG: 40 INJECTION, POWDER, FOR SOLUTION INTRAMUSCULAR; INTRAVENOUS at 04:32

## 2021-01-01 RX ADMIN — PROPOFOL 50 MCG/KG/MIN: 10 INJECTION, EMULSION INTRAVENOUS at 18:03

## 2021-01-01 RX ADMIN — BENZONATATE 200 MG: 100 CAPSULE ORAL at 16:37

## 2021-01-01 RX ADMIN — METHYLPREDNISOLONE SODIUM SUCCINATE 40 MG: 40 INJECTION, POWDER, FOR SOLUTION INTRAMUSCULAR; INTRAVENOUS at 10:05

## 2021-01-01 RX ADMIN — FAMOTIDINE 20 MG: 10 INJECTION, SOLUTION INTRAVENOUS at 08:01

## 2021-01-01 RX ADMIN — ALBUTEROL SULFATE 2 PUFF: 90 AEROSOL, METERED RESPIRATORY (INHALATION) at 16:56

## 2021-01-01 RX ADMIN — Medication 10 ML: at 22:17

## 2021-01-01 RX ADMIN — Medication 300 MCG/HR: at 02:43

## 2021-01-01 RX ADMIN — Medication 300 MCG/HR: at 09:44

## 2021-01-01 RX ADMIN — Medication 300 MCG/HR: at 12:35

## 2021-01-01 RX ADMIN — ACETAMINOPHEN 650 MG: 325 TABLET ORAL at 04:58

## 2021-01-01 RX ADMIN — Medication 300 MCG/HR: at 17:45

## 2021-01-01 RX ADMIN — INSULIN LISPRO 2 UNITS: 100 INJECTION, SOLUTION INTRAVENOUS; SUBCUTANEOUS at 18:50

## 2021-01-01 RX ADMIN — MIDAZOLAM 10 MG/HR: 5 INJECTION, SOLUTION INTRAMUSCULAR; INTRAVENOUS at 08:40

## 2021-01-01 RX ADMIN — Medication 300 MCG/HR: at 08:47

## 2021-01-01 RX ADMIN — SODIUM ZIRCONIUM CYCLOSILICATE 10 G: 10 POWDER, FOR SUSPENSION ORAL at 08:11

## 2021-01-01 RX ADMIN — ROCURONIUM BROMIDE 69.8 MG: 50 INJECTION, SOLUTION INTRAVENOUS at 09:26

## 2021-01-01 RX ADMIN — MIDAZOLAM 20 MG/HR: 5 INJECTION, SOLUTION INTRAMUSCULAR; INTRAVENOUS at 23:35

## 2021-01-01 RX ADMIN — PROPOFOL 50 MCG/KG/MIN: 10 INJECTION, EMULSION INTRAVENOUS at 10:40

## 2021-01-01 RX ADMIN — PROPOFOL 50 MCG/KG/MIN: 10 INJECTION, EMULSION INTRAVENOUS at 16:12

## 2021-01-01 RX ADMIN — Medication 10 ML: at 13:10

## 2021-01-01 RX ADMIN — REMDESIVIR 200 MG: 100 INJECTION, POWDER, LYOPHILIZED, FOR SOLUTION INTRAVENOUS at 14:00

## 2021-01-01 RX ADMIN — DEXMEDETOMIDINE HYDROCHLORIDE 1.5 MCG/KG/HR: 400 INJECTION INTRAVENOUS at 09:07

## 2021-01-01 RX ADMIN — PROPOFOL 100 MG: 10 INJECTION, EMULSION INTRAVENOUS at 06:02

## 2021-01-01 RX ADMIN — MIDAZOLAM 5 MG: 1 INJECTION INTRAMUSCULAR; INTRAVENOUS at 13:34

## 2021-01-01 RX ADMIN — OXYCODONE 20 MG: 5 TABLET ORAL at 18:08

## 2021-01-01 RX ADMIN — Medication: at 14:08

## 2021-01-01 RX ADMIN — Medication 10 ML: at 13:44

## 2021-01-01 RX ADMIN — METHYLPREDNISOLONE SODIUM SUCCINATE 20 MG: 40 INJECTION, POWDER, FOR SOLUTION INTRAMUSCULAR; INTRAVENOUS at 21:31

## 2021-01-01 RX ADMIN — PROPOFOL 50 MCG/KG/MIN: 10 INJECTION, EMULSION INTRAVENOUS at 18:06

## 2021-01-01 RX ADMIN — PROPOFOL 50 MCG/KG/MIN: 10 INJECTION, EMULSION INTRAVENOUS at 18:28

## 2021-01-01 RX ADMIN — BENZONATATE 200 MG: 100 CAPSULE ORAL at 16:21

## 2021-01-01 RX ADMIN — DOCUSATE SODIUM 100 MG: 100 CAPSULE, LIQUID FILLED ORAL at 10:05

## 2021-01-01 RX ADMIN — PROPOFOL 50 MCG/KG/MIN: 10 INJECTION, EMULSION INTRAVENOUS at 06:25

## 2021-01-01 RX ADMIN — DEXMEDETOMIDINE HYDROCHLORIDE 1.5 MCG/KG/HR: 400 INJECTION INTRAVENOUS at 00:40

## 2021-01-01 RX ADMIN — DEXMEDETOMIDINE HYDROCHLORIDE 1 MCG/KG/HR: 400 INJECTION INTRAVENOUS at 09:54

## 2021-01-01 RX ADMIN — FAMOTIDINE 20 MG: 10 INJECTION, SOLUTION INTRAVENOUS at 08:39

## 2021-01-01 RX ADMIN — FAMOTIDINE 20 MG: 10 INJECTION, SOLUTION INTRAVENOUS at 20:46

## 2021-01-01 RX ADMIN — CEFEPIME HYDROCHLORIDE 2 G: 2 INJECTION, POWDER, FOR SOLUTION INTRAVENOUS at 15:40

## 2021-01-01 RX ADMIN — ZINC SULFATE 220 MG (50 MG) CAPSULE 1 CAPSULE: CAPSULE at 10:05

## 2021-01-01 RX ADMIN — CHLORHEXIDINE GLUCONATE 15 ML: 0.12 RINSE ORAL at 08:33

## 2021-01-01 RX ADMIN — METHYLPREDNISOLONE SODIUM SUCCINATE 40 MG: 40 INJECTION, POWDER, FOR SOLUTION INTRAMUSCULAR; INTRAVENOUS at 21:10

## 2021-01-01 RX ADMIN — Medication 17.2 MG: at 09:28

## 2021-01-01 RX ADMIN — PROPOFOL 50 MCG/KG/MIN: 10 INJECTION, EMULSION INTRAVENOUS at 09:08

## 2021-01-01 RX ADMIN — VANCOMYCIN HYDROCHLORIDE 1250 MG: 10 INJECTION, POWDER, LYOPHILIZED, FOR SOLUTION INTRAVENOUS at 03:12

## 2021-01-01 RX ADMIN — Medication 10 ML: at 21:07

## 2021-01-01 RX ADMIN — METHYLPREDNISOLONE SODIUM SUCCINATE 20 MG: 40 INJECTION, POWDER, FOR SOLUTION INTRAMUSCULAR; INTRAVENOUS at 15:03

## 2021-01-01 RX ADMIN — PROPOFOL 50 MCG/KG/MIN: 10 INJECTION, EMULSION INTRAVENOUS at 04:09

## 2021-01-01 RX ADMIN — GUAIFENESIN 1200 MG: 600 TABLET, EXTENDED RELEASE ORAL at 09:58

## 2021-01-01 RX ADMIN — NOREPINEPHRINE BITARTRATE 25 MCG/MIN: 1 INJECTION, SOLUTION, CONCENTRATE INTRAVENOUS at 00:04

## 2021-01-01 RX ADMIN — HUMAN INSULIN 5 UNITS: 100 INJECTION, SUSPENSION SUBCUTANEOUS at 20:18

## 2021-01-01 RX ADMIN — FAMOTIDINE 20 MG: 10 INJECTION, SOLUTION INTRAVENOUS at 09:28

## 2021-01-01 RX ADMIN — DEXAMETHASONE SODIUM PHOSPHATE 6 MG: 4 INJECTION, SOLUTION INTRAMUSCULAR; INTRAVENOUS at 14:09

## 2021-01-01 RX ADMIN — Medication 300 MCG/HR: at 22:45

## 2021-01-01 RX ADMIN — INSULIN LISPRO 5 UNITS: 100 INJECTION, SOLUTION INTRAVENOUS; SUBCUTANEOUS at 18:18

## 2021-01-01 RX ADMIN — GUAIFENESIN 1200 MG: 600 TABLET, EXTENDED RELEASE ORAL at 19:35

## 2021-01-01 RX ADMIN — MIDAZOLAM 20 MG/HR: 5 INJECTION, SOLUTION INTRAMUSCULAR; INTRAVENOUS at 05:20

## 2021-01-01 RX ADMIN — ENOXAPARIN SODIUM 40 MG: 100 INJECTION SUBCUTANEOUS at 15:42

## 2021-01-01 RX ADMIN — METHYLPREDNISOLONE SODIUM SUCCINATE 40 MG: 40 INJECTION, POWDER, FOR SOLUTION INTRAMUSCULAR; INTRAVENOUS at 03:14

## 2021-01-01 RX ADMIN — ACETAMINOPHEN 650 MG: 325 TABLET ORAL at 13:23

## 2021-01-01 RX ADMIN — REMDESIVIR 100 MG: 5 INJECTION INTRAVENOUS at 14:59

## 2021-01-01 RX ADMIN — PROPOFOL 50 MCG/KG/MIN: 10 INJECTION, EMULSION INTRAVENOUS at 20:41

## 2021-01-01 RX ADMIN — FAMOTIDINE 20 MG: 10 INJECTION, SOLUTION INTRAVENOUS at 09:31

## 2021-01-01 RX ADMIN — MINERAL OIL AND PETROLATUM: 150; 830 OINTMENT OPHTHALMIC at 20:43

## 2021-01-01 RX ADMIN — Medication 10 ML: at 13:00

## 2021-01-01 RX ADMIN — METHYLPREDNISOLONE SODIUM SUCCINATE 40 MG: 40 INJECTION, POWDER, FOR SOLUTION INTRAMUSCULAR; INTRAVENOUS at 16:28

## 2021-01-01 RX ADMIN — DEXMEDETOMIDINE HYDROCHLORIDE 1.5 MCG/KG/HR: 400 INJECTION INTRAVENOUS at 03:55

## 2021-01-01 RX ADMIN — HUMAN INSULIN 10 UNITS: 100 INJECTION, SOLUTION SUBCUTANEOUS at 20:48

## 2021-01-01 RX ADMIN — GUAIFENESIN AND CODEINE PHOSPHATE 10 ML: 100; 10 SOLUTION ORAL at 14:22

## 2021-01-01 RX ADMIN — Medication 10 ML: at 13:26

## 2021-01-01 RX ADMIN — CISATRACURIUM BESYLATE 5 MCG/KG/MIN: 2 INJECTION INTRAVENOUS at 20:30

## 2021-01-01 RX ADMIN — Medication 17.2 MG: at 09:31

## 2021-01-01 RX ADMIN — ENOXAPARIN SODIUM 40 MG: 100 INJECTION SUBCUTANEOUS at 15:00

## 2021-01-01 RX ADMIN — Medication 300 MCG/HR: at 15:22

## 2021-01-01 RX ADMIN — PROPOFOL 50 MCG/KG/MIN: 10 INJECTION, EMULSION INTRAVENOUS at 09:43

## 2021-01-01 RX ADMIN — PROPOFOL 50 MCG/KG/MIN: 10 INJECTION, EMULSION INTRAVENOUS at 03:25

## 2021-01-01 RX ADMIN — CHLORHEXIDINE GLUCONATE 15 ML: 0.12 RINSE ORAL at 20:18

## 2021-01-01 RX ADMIN — DEXMEDETOMIDINE HYDROCHLORIDE 1.5 MCG/KG/HR: 400 INJECTION INTRAVENOUS at 04:46

## 2021-01-01 RX ADMIN — METHYLPREDNISOLONE SODIUM SUCCINATE 20 MG: 40 INJECTION, POWDER, FOR SOLUTION INTRAMUSCULAR; INTRAVENOUS at 13:04

## 2021-01-01 RX ADMIN — OXYCODONE HYDROCHLORIDE AND ACETAMINOPHEN 500 MG: 500 TABLET ORAL at 13:57

## 2021-01-01 RX ADMIN — CISATRACURIUM BESYLATE 2.5 MCG/KG/MIN: 2 INJECTION INTRAVENOUS at 00:00

## 2021-01-01 RX ADMIN — ENOXAPARIN SODIUM 40 MG: 100 INJECTION SUBCUTANEOUS at 02:01

## 2021-01-01 RX ADMIN — BENZONATATE 200 MG: 100 CAPSULE ORAL at 11:32

## 2021-01-01 RX ADMIN — METHYLPREDNISOLONE SODIUM SUCCINATE 40 MG: 40 INJECTION, POWDER, FOR SOLUTION INTRAMUSCULAR; INTRAVENOUS at 04:29

## 2021-01-01 RX ADMIN — MIDAZOLAM 16 MG/HR: 5 INJECTION, SOLUTION INTRAMUSCULAR; INTRAVENOUS at 14:20

## 2021-01-01 RX ADMIN — FAMOTIDINE 20 MG: 10 INJECTION, SOLUTION INTRAVENOUS at 09:16

## 2021-01-01 RX ADMIN — BENZONATATE 200 MG: 100 CAPSULE ORAL at 11:58

## 2021-01-01 RX ADMIN — PROPOFOL 50 MCG/KG/MIN: 10 INJECTION, EMULSION INTRAVENOUS at 02:58

## 2021-01-01 RX ADMIN — MIDAZOLAM 20 MG/HR: 5 INJECTION, SOLUTION INTRAMUSCULAR; INTRAVENOUS at 18:14

## 2021-01-01 RX ADMIN — ROCURONIUM BROMIDE 50 MG: 50 INJECTION, SOLUTION INTRAVENOUS at 09:35

## 2021-01-01 RX ADMIN — INSULIN LISPRO 2 UNITS: 100 INJECTION, SOLUTION INTRAVENOUS; SUBCUTANEOUS at 18:20

## 2021-01-01 RX ADMIN — CEFEPIME HYDROCHLORIDE 2 G: 2 INJECTION, POWDER, FOR SOLUTION INTRAVENOUS at 09:07

## 2021-01-01 RX ADMIN — MIDAZOLAM 20 MG/HR: 5 INJECTION, SOLUTION INTRAMUSCULAR; INTRAVENOUS at 19:34

## 2021-01-01 RX ADMIN — METHYLPREDNISOLONE SODIUM SUCCINATE 40 MG: 40 INJECTION, POWDER, FOR SOLUTION INTRAMUSCULAR; INTRAVENOUS at 09:32

## 2021-01-01 RX ADMIN — METHYLPREDNISOLONE SODIUM SUCCINATE 40 MG: 40 INJECTION, POWDER, FOR SOLUTION INTRAMUSCULAR; INTRAVENOUS at 09:46

## 2021-01-01 RX ADMIN — HUMAN INSULIN 5 UNITS: 100 INJECTION, SUSPENSION SUBCUTANEOUS at 12:03

## 2021-01-01 RX ADMIN — CHLORHEXIDINE GLUCONATE 15 ML: 0.12 RINSE ORAL at 20:40

## 2021-01-01 RX ADMIN — DEXMEDETOMIDINE HYDROCHLORIDE 1.5 MCG/KG/HR: 400 INJECTION INTRAVENOUS at 18:40

## 2021-01-01 RX ADMIN — Medication 300 MCG/HR: at 14:42

## 2021-01-01 RX ADMIN — DEXMEDETOMIDINE HYDROCHLORIDE 1.5 MCG/KG/HR: 400 INJECTION INTRAVENOUS at 16:18

## 2021-01-01 RX ADMIN — ENOXAPARIN SODIUM 40 MG: 100 INJECTION SUBCUTANEOUS at 03:01

## 2021-01-01 RX ADMIN — Medication 10 ML: at 13:28

## 2021-01-01 RX ADMIN — ENOXAPARIN SODIUM 40 MG: 100 INJECTION SUBCUTANEOUS at 02:18

## 2021-01-01 RX ADMIN — Medication 300 MCG/HR: at 02:00

## 2021-01-01 RX ADMIN — DEXMEDETOMIDINE HYDROCHLORIDE 1.5 MCG/KG/HR: 400 INJECTION INTRAVENOUS at 01:03

## 2021-01-01 RX ADMIN — INSULIN LISPRO 2 UNITS: 100 INJECTION, SOLUTION INTRAVENOUS; SUBCUTANEOUS at 17:06

## 2021-01-01 RX ADMIN — PROPOFOL 50 MCG/KG/MIN: 10 INJECTION, EMULSION INTRAVENOUS at 03:55

## 2021-01-01 RX ADMIN — BENZONATATE 200 MG: 100 CAPSULE ORAL at 15:10

## 2021-01-01 RX ADMIN — METHYLPREDNISOLONE SODIUM SUCCINATE 20 MG: 40 INJECTION, POWDER, FOR SOLUTION INTRAMUSCULAR; INTRAVENOUS at 16:46

## 2021-01-01 RX ADMIN — DOCUSATE SODIUM 100 MG: 50 LIQUID ORAL at 09:27

## 2021-01-01 RX ADMIN — OXYCODONE 15 MG: 5 TABLET ORAL at 03:31

## 2021-01-01 RX ADMIN — INSULIN LISPRO 2 UNITS: 100 INJECTION, SOLUTION INTRAVENOUS; SUBCUTANEOUS at 12:03

## 2021-01-01 RX ADMIN — MIDAZOLAM 18 MG/HR: 5 INJECTION, SOLUTION INTRAMUSCULAR; INTRAVENOUS at 15:55

## 2021-01-01 RX ADMIN — CISATRACURIUM BESYLATE 5 MCG/KG/MIN: 2 INJECTION INTRAVENOUS at 00:06

## 2021-01-01 RX ADMIN — PROPOFOL 50 MCG/KG/MIN: 10 INJECTION, EMULSION INTRAVENOUS at 20:40

## 2021-01-01 RX ADMIN — Medication 17.2 MG: at 09:58

## 2021-01-01 RX ADMIN — Medication 3 MG: at 22:18

## 2021-01-01 RX ADMIN — Medication 10 ML: at 21:30

## 2021-01-01 RX ADMIN — DEXMEDETOMIDINE HYDROCHLORIDE 1.5 MCG/KG/HR: 400 INJECTION INTRAVENOUS at 16:04

## 2021-01-01 RX ADMIN — GUAIFENESIN AND CODEINE PHOSPHATE 10 ML: 100; 10 SOLUTION ORAL at 04:52

## 2021-01-01 RX ADMIN — DIAZEPAM 20 MG: 5 TABLET ORAL at 18:46

## 2021-01-01 RX ADMIN — ALBUTEROL SULFATE 2 PUFF: 90 AEROSOL, METERED RESPIRATORY (INHALATION) at 23:32

## 2021-01-01 RX ADMIN — Medication 17.2 MG: at 09:00

## 2021-01-01 RX ADMIN — PROPOFOL 50 MCG/KG/MIN: 10 INJECTION, EMULSION INTRAVENOUS at 11:25

## 2021-01-01 RX ADMIN — CISATRACURIUM BESYLATE 6 MCG/KG/MIN: 2 INJECTION INTRAVENOUS at 17:00

## 2021-01-01 RX ADMIN — PROPOFOL 50 MCG/KG/MIN: 10 INJECTION, EMULSION INTRAVENOUS at 03:32

## 2021-01-01 RX ADMIN — METHYLPREDNISOLONE SODIUM SUCCINATE 40 MG: 40 INJECTION, POWDER, FOR SOLUTION INTRAMUSCULAR; INTRAVENOUS at 21:00

## 2021-01-01 RX ADMIN — ENOXAPARIN SODIUM 40 MG: 100 INJECTION SUBCUTANEOUS at 14:44

## 2021-01-01 RX ADMIN — GUAIFENESIN AND CODEINE PHOSPHATE 10 ML: 100; 10 SOLUTION ORAL at 21:55

## 2021-01-01 RX ADMIN — Medication 10 ML: at 06:48

## 2021-01-01 RX ADMIN — GUAIFENESIN 1200 MG: 600 TABLET, EXTENDED RELEASE ORAL at 09:13

## 2021-01-01 RX ADMIN — DEXMEDETOMIDINE HYDROCHLORIDE 1.5 MCG/KG/HR: 400 INJECTION INTRAVENOUS at 02:31

## 2021-01-01 RX ADMIN — HYDRALAZINE HYDROCHLORIDE 10 MG: 20 INJECTION, SOLUTION INTRAMUSCULAR; INTRAVENOUS at 15:38

## 2021-01-01 RX ADMIN — DOCUSATE SODIUM 100 MG: 50 LIQUID ORAL at 09:00

## 2021-01-01 RX ADMIN — CEFEPIME HYDROCHLORIDE 2 G: 2 INJECTION, POWDER, FOR SOLUTION INTRAVENOUS at 06:26

## 2021-01-01 RX ADMIN — DOCUSATE SODIUM 100 MG: 100 CAPSULE, LIQUID FILLED ORAL at 09:22

## 2021-01-01 RX ADMIN — BENZONATATE 200 MG: 100 CAPSULE ORAL at 09:13

## 2021-01-01 RX ADMIN — Medication 300 MCG/HR: at 01:32

## 2021-01-01 RX ADMIN — CISATRACURIUM BESYLATE 3 MCG/KG/MIN: 2 INJECTION INTRAVENOUS at 05:10

## 2021-01-01 RX ADMIN — ENOXAPARIN SODIUM 40 MG: 100 INJECTION SUBCUTANEOUS at 03:26

## 2021-01-01 RX ADMIN — METHYLPREDNISOLONE SODIUM SUCCINATE 20 MG: 40 INJECTION, POWDER, FOR SOLUTION INTRAMUSCULAR; INTRAVENOUS at 10:39

## 2021-01-01 RX ADMIN — INSULIN LISPRO 2 UNITS: 100 INJECTION, SOLUTION INTRAVENOUS; SUBCUTANEOUS at 05:47

## 2021-01-01 RX ADMIN — PROPOFOL 50 MCG/KG/MIN: 10 INJECTION, EMULSION INTRAVENOUS at 17:07

## 2021-01-01 RX ADMIN — Medication 300 MCG/HR: at 06:41

## 2021-01-01 RX ADMIN — Medication 10 ML: at 21:05

## 2021-01-01 RX ADMIN — Medication 300 MCG/HR: at 22:00

## 2021-01-01 RX ADMIN — PROPOFOL 50 MCG/KG/MIN: 10 INJECTION, EMULSION INTRAVENOUS at 11:53

## 2021-01-01 RX ADMIN — METHYLPREDNISOLONE SODIUM SUCCINATE 40 MG: 40 INJECTION, POWDER, FOR SOLUTION INTRAMUSCULAR; INTRAVENOUS at 15:52

## 2021-01-01 RX ADMIN — MIDAZOLAM 20 MG/HR: 5 INJECTION, SOLUTION INTRAMUSCULAR; INTRAVENOUS at 12:48

## 2021-01-01 RX ADMIN — CHLORHEXIDINE GLUCONATE 15 ML: 0.12 RINSE ORAL at 08:44

## 2021-01-01 RX ADMIN — ACETAMINOPHEN 650 MG: 325 TABLET ORAL at 15:37

## 2021-01-01 RX ADMIN — DEXTROSE MONOHYDRATE 25 G: 25 INJECTION, SOLUTION INTRAVENOUS at 13:32

## 2021-01-01 RX ADMIN — ACETAMINOPHEN 650 MG: 325 TABLET ORAL at 18:13

## 2021-01-01 RX ADMIN — Medication 300 MCG/HR: at 09:25

## 2021-01-01 RX ADMIN — DEXMEDETOMIDINE HYDROCHLORIDE 1.5 MCG/KG/HR: 400 INJECTION INTRAVENOUS at 02:08

## 2021-01-01 RX ADMIN — DIAZEPAM 5 MG: 5 TABLET ORAL at 18:51

## 2021-01-01 RX ADMIN — ENOXAPARIN SODIUM 40 MG: 100 INJECTION SUBCUTANEOUS at 03:25

## 2021-01-01 RX ADMIN — METHYLPREDNISOLONE SODIUM SUCCINATE 40 MG: 40 INJECTION, POWDER, FOR SOLUTION INTRAMUSCULAR; INTRAVENOUS at 03:12

## 2021-01-01 RX ADMIN — ENOXAPARIN SODIUM 40 MG: 100 INJECTION SUBCUTANEOUS at 15:39

## 2021-01-01 RX ADMIN — DEXMEDETOMIDINE HYDROCHLORIDE 1.5 MCG/KG/HR: 400 INJECTION INTRAVENOUS at 15:23

## 2021-01-01 RX ADMIN — PROPOFOL 35 MCG/KG/MIN: 10 INJECTION, EMULSION INTRAVENOUS at 04:28

## 2021-01-01 RX ADMIN — BUMETANIDE 2 MG: 0.25 INJECTION, SOLUTION INTRAMUSCULAR; INTRAVENOUS at 09:13

## 2021-01-01 RX ADMIN — CEFEPIME HYDROCHLORIDE 2 G: 2 INJECTION, POWDER, FOR SOLUTION INTRAVENOUS at 09:41

## 2021-01-01 RX ADMIN — METHYLPREDNISOLONE SODIUM SUCCINATE 40 MG: 40 INJECTION, POWDER, FOR SOLUTION INTRAMUSCULAR; INTRAVENOUS at 09:03

## 2021-01-01 RX ADMIN — Medication 8 ML: at 14:00

## 2021-01-01 RX ADMIN — PROPOFOL 50 MCG/KG/MIN: 10 INJECTION, EMULSION INTRAVENOUS at 14:18

## 2021-01-01 RX ADMIN — DEXMEDETOMIDINE HYDROCHLORIDE 1.5 MCG/KG/HR: 400 INJECTION INTRAVENOUS at 08:19

## 2021-01-01 RX ADMIN — OXYCODONE 20 MG: 5 TABLET ORAL at 03:32

## 2021-01-01 RX ADMIN — INSULIN LISPRO 2 UNITS: 100 INJECTION, SOLUTION INTRAVENOUS; SUBCUTANEOUS at 06:00

## 2021-01-01 RX ADMIN — DEXAMETHASONE SODIUM PHOSPHATE 6 MG: 4 INJECTION, SOLUTION INTRAMUSCULAR; INTRAVENOUS at 13:26

## 2021-01-01 RX ADMIN — ROCURONIUM BROMIDE 50 MG: 10 INJECTION, SOLUTION INTRAVENOUS at 22:10

## 2021-01-01 RX ADMIN — PROPOFOL 50 MCG/KG/MIN: 10 INJECTION, EMULSION INTRAVENOUS at 05:41

## 2021-01-01 RX ADMIN — HUMAN INSULIN 5 UNITS: 100 INJECTION, SUSPENSION SUBCUTANEOUS at 20:55

## 2021-01-01 RX ADMIN — FUROSEMIDE 80 MG: 10 INJECTION, SOLUTION INTRAMUSCULAR; INTRAVENOUS at 20:50

## 2021-01-01 RX ADMIN — DEXMEDETOMIDINE HYDROCHLORIDE 1.5 MCG/KG/HR: 400 INJECTION INTRAVENOUS at 18:52

## 2021-01-01 RX ADMIN — DEXMEDETOMIDINE HYDROCHLORIDE 1.5 MCG/KG/HR: 400 INJECTION INTRAVENOUS at 14:32

## 2021-01-01 RX ADMIN — BENZONATATE 100 MG: 100 CAPSULE ORAL at 15:37

## 2021-01-01 RX ADMIN — ONDANSETRON 4 MG: 2 INJECTION INTRAMUSCULAR; INTRAVENOUS at 15:44

## 2021-01-01 RX ADMIN — MIDAZOLAM 20 MG/HR: 5 INJECTION, SOLUTION INTRAMUSCULAR; INTRAVENOUS at 15:07

## 2021-01-01 RX ADMIN — CHLORHEXIDINE GLUCONATE 15 ML: 0.12 RINSE ORAL at 21:38

## 2021-01-01 RX ADMIN — METHYLPREDNISOLONE SODIUM SUCCINATE 40 MG: 40 INJECTION, POWDER, FOR SOLUTION INTRAMUSCULAR; INTRAVENOUS at 03:01

## 2021-01-01 RX ADMIN — MIDAZOLAM 7 MG/HR: 5 INJECTION, SOLUTION INTRAMUSCULAR; INTRAVENOUS at 07:56

## 2021-01-01 RX ADMIN — BENZONATATE 200 MG: 100 CAPSULE ORAL at 18:21

## 2021-01-01 RX ADMIN — Medication 17.2 MG: at 09:16

## 2021-01-01 RX ADMIN — CISATRACURIUM BESYLATE 10 MCG/KG/MIN: 2 INJECTION INTRAVENOUS at 11:55

## 2021-01-01 RX ADMIN — CEFEPIME HYDROCHLORIDE 2 G: 2 INJECTION, POWDER, FOR SOLUTION INTRAVENOUS at 18:09

## 2021-01-01 RX ADMIN — ENOXAPARIN SODIUM 40 MG: 100 INJECTION SUBCUTANEOUS at 02:06

## 2021-01-01 RX ADMIN — HUMAN INSULIN 5 UNITS: 100 INJECTION, SUSPENSION SUBCUTANEOUS at 23:04

## 2021-01-01 RX ADMIN — DEXMEDETOMIDINE HYDROCHLORIDE 0.4 MCG/KG/HR: 400 INJECTION INTRAVENOUS at 06:42

## 2021-01-01 RX ADMIN — Medication 300 MCG/HR: at 01:57

## 2021-01-01 RX ADMIN — INSULIN LISPRO 2 UNITS: 100 INJECTION, SOLUTION INTRAVENOUS; SUBCUTANEOUS at 18:31

## 2021-01-01 RX ADMIN — HUMAN INSULIN 5 UNITS: 100 INJECTION, SUSPENSION SUBCUTANEOUS at 08:01

## 2021-01-01 RX ADMIN — MINERAL OIL AND PETROLATUM: 150; 830 OINTMENT OPHTHALMIC at 20:21

## 2021-01-01 RX ADMIN — CEFEPIME HYDROCHLORIDE 2 G: 2 INJECTION, POWDER, FOR SOLUTION INTRAVENOUS at 15:29

## 2021-01-01 RX ADMIN — OXYCODONE HYDROCHLORIDE AND ACETAMINOPHEN 500 MG: 500 TABLET ORAL at 21:46

## 2021-01-01 RX ADMIN — DEXMEDETOMIDINE HYDROCHLORIDE 1.5 MCG/KG/HR: 400 INJECTION INTRAVENOUS at 11:55

## 2021-01-01 RX ADMIN — GUAIFENESIN AND CODEINE PHOSPHATE 10 ML: 100; 10 SOLUTION ORAL at 06:06

## 2021-01-01 RX ADMIN — Medication 10 ML: at 21:39

## 2021-01-01 RX ADMIN — GUAIFENESIN AND CODEINE PHOSPHATE 10 ML: 100; 10 SOLUTION ORAL at 18:30

## 2021-01-01 RX ADMIN — DEXMEDETOMIDINE HYDROCHLORIDE 1.5 MCG/KG/HR: 400 INJECTION INTRAVENOUS at 04:12

## 2021-01-01 RX ADMIN — OXYCODONE HYDROCHLORIDE AND ACETAMINOPHEN 500 MG: 500 TABLET ORAL at 08:45

## 2021-01-01 RX ADMIN — MIDAZOLAM 20 MG/HR: 5 INJECTION, SOLUTION INTRAMUSCULAR; INTRAVENOUS at 13:36

## 2021-01-01 RX ADMIN — CHLORTHALIDONE 25 MG: 25 TABLET ORAL at 09:15

## 2021-01-01 RX ADMIN — METHYLPREDNISOLONE SODIUM SUCCINATE 20 MG: 40 INJECTION, POWDER, FOR SOLUTION INTRAMUSCULAR; INTRAVENOUS at 16:12

## 2021-01-01 RX ADMIN — DOCUSATE SODIUM 100 MG: 100 CAPSULE, LIQUID FILLED ORAL at 09:21

## 2021-01-01 RX ADMIN — PROPOFOL 50 MCG/KG/MIN: 10 INJECTION, EMULSION INTRAVENOUS at 19:06

## 2021-01-01 RX ADMIN — Medication 10 ML: at 21:40

## 2021-01-01 RX ADMIN — Medication 2000 UNITS: at 09:58

## 2021-01-01 RX ADMIN — Medication 2000 UNITS: at 09:19

## 2021-01-01 RX ADMIN — DEXMEDETOMIDINE HYDROCHLORIDE 1.5 MCG/KG/HR: 400 INJECTION INTRAVENOUS at 11:44

## 2021-01-01 RX ADMIN — BENZONATATE 200 MG: 100 CAPSULE ORAL at 15:42

## 2021-01-01 RX ADMIN — Medication 300 MCG/HR: at 12:05

## 2021-01-01 RX ADMIN — OXYCODONE 15 MG: 5 TABLET ORAL at 18:20

## 2021-01-01 RX ADMIN — MIDAZOLAM 20 MG/HR: 5 INJECTION, SOLUTION INTRAMUSCULAR; INTRAVENOUS at 10:56

## 2021-01-01 RX ADMIN — Medication 180 MCG: at 06:29

## 2021-01-01 RX ADMIN — BENZONATATE 200 MG: 100 CAPSULE ORAL at 09:16

## 2021-01-01 RX ADMIN — Medication 300 MCG/HR: at 06:58

## 2021-01-01 RX ADMIN — OXYCODONE 15 MG: 5 TABLET ORAL at 11:06

## 2021-01-01 RX ADMIN — METHYLPREDNISOLONE SODIUM SUCCINATE 20 MG: 40 INJECTION, POWDER, FOR SOLUTION INTRAMUSCULAR; INTRAVENOUS at 21:30

## 2021-01-01 RX ADMIN — DOCUSATE SODIUM 100 MG: 50 LIQUID ORAL at 08:43

## 2021-01-01 RX ADMIN — MIDAZOLAM 20 MG/HR: 5 INJECTION, SOLUTION INTRAMUSCULAR; INTRAVENOUS at 03:55

## 2021-01-01 RX ADMIN — Medication 17.2 MG: at 09:22

## 2021-01-01 RX ADMIN — INSULIN LISPRO 3 UNITS: 100 INJECTION, SOLUTION INTRAVENOUS; SUBCUTANEOUS at 11:59

## 2021-01-01 RX ADMIN — DOCUSATE SODIUM 100 MG: 50 LIQUID ORAL at 10:15

## 2021-01-01 RX ADMIN — CHLORHEXIDINE GLUCONATE 15 ML: 0.12 RINSE ORAL at 09:34

## 2021-01-01 RX ADMIN — FAMOTIDINE 20 MG: 10 INJECTION, SOLUTION INTRAVENOUS at 08:59

## 2021-01-01 RX ADMIN — FAMOTIDINE 20 MG: 10 INJECTION, SOLUTION INTRAVENOUS at 20:40

## 2021-01-01 RX ADMIN — PROPOFOL 50 MCG/KG/MIN: 10 INJECTION, EMULSION INTRAVENOUS at 23:06

## 2021-01-01 RX ADMIN — MINERAL OIL AND PETROLATUM: 150; 830 OINTMENT OPHTHALMIC at 08:35

## 2021-01-01 RX ADMIN — GUAIFENESIN AND CODEINE PHOSPHATE 10 ML: 100; 10 SOLUTION ORAL at 05:03

## 2021-01-01 RX ADMIN — CALCIUM GLUCONATE 1000 MG: 20 INJECTION, SOLUTION INTRAVENOUS at 13:33

## 2021-01-01 RX ADMIN — MINERAL OIL AND PETROLATUM: 150; 830 OINTMENT OPHTHALMIC at 09:33

## 2021-01-01 RX ADMIN — Medication 300 MCG/HR: at 17:53

## 2021-01-01 RX ADMIN — CALCIUM GLUCONATE 2 G: 20 INJECTION, SOLUTION INTRAVENOUS at 20:50

## 2021-01-01 RX ADMIN — OXYCODONE HYDROCHLORIDE AND ACETAMINOPHEN 500 MG: 500 TABLET ORAL at 20:25

## 2021-01-01 RX ADMIN — DIAZEPAM 5 MG: 5 TABLET ORAL at 11:03

## 2021-01-01 RX ADMIN — MIDAZOLAM 20 MG/HR: 5 INJECTION, SOLUTION INTRAMUSCULAR; INTRAVENOUS at 15:12

## 2021-01-01 RX ADMIN — BENZONATATE 200 MG: 100 CAPSULE ORAL at 13:54

## 2021-01-01 RX ADMIN — ENOXAPARIN SODIUM 40 MG: 100 INJECTION SUBCUTANEOUS at 14:10

## 2021-01-01 RX ADMIN — METHYLPREDNISOLONE SODIUM SUCCINATE 40 MG: 40 INJECTION, POWDER, FOR SOLUTION INTRAMUSCULAR; INTRAVENOUS at 21:53

## 2021-01-01 RX ADMIN — ROCURONIUM BROMIDE 50 MG: 10 INJECTION, SOLUTION INTRAVENOUS at 22:11

## 2021-01-01 RX ADMIN — CHLORHEXIDINE GLUCONATE 15 ML: 0.12 RINSE ORAL at 08:11

## 2021-01-01 RX ADMIN — DEXMEDETOMIDINE HYDROCHLORIDE 1.5 MCG/KG/HR: 400 INJECTION INTRAVENOUS at 17:48

## 2021-01-01 RX ADMIN — Medication 300 MCG/HR: at 14:38

## 2021-01-01 RX ADMIN — ENOXAPARIN SODIUM 40 MG: 100 INJECTION SUBCUTANEOUS at 14:14

## 2021-01-01 RX ADMIN — OXYCODONE HYDROCHLORIDE AND ACETAMINOPHEN 500 MG: 500 TABLET ORAL at 09:21

## 2021-01-01 RX ADMIN — MIDAZOLAM 20 MG/HR: 5 INJECTION, SOLUTION INTRAMUSCULAR; INTRAVENOUS at 22:19

## 2021-01-01 RX ADMIN — Medication 10 ML: at 06:55

## 2021-01-01 RX ADMIN — DEXMEDETOMIDINE HYDROCHLORIDE 1.5 MCG/KG/HR: 400 INJECTION INTRAVENOUS at 20:31

## 2021-01-01 RX ADMIN — PROPOFOL 50 MCG/KG/MIN: 10 INJECTION, EMULSION INTRAVENOUS at 05:48

## 2021-01-01 RX ADMIN — ENOXAPARIN SODIUM 40 MG: 100 INJECTION SUBCUTANEOUS at 02:51

## 2021-01-01 RX ADMIN — ENOXAPARIN SODIUM 40 MG: 100 INJECTION SUBCUTANEOUS at 03:29

## 2021-01-01 RX ADMIN — DEXMEDETOMIDINE HYDROCHLORIDE 1.5 MCG/KG/HR: 400 INJECTION INTRAVENOUS at 01:38

## 2021-01-01 RX ADMIN — CEFEPIME HYDROCHLORIDE 2 G: 2 INJECTION, POWDER, FOR SOLUTION INTRAVENOUS at 01:16

## 2021-01-01 RX ADMIN — ACETAMINOPHEN 650 MG: 325 TABLET ORAL at 02:00

## 2021-01-01 RX ADMIN — PROPOFOL 50 MCG/KG/MIN: 10 INJECTION, EMULSION INTRAVENOUS at 09:06

## 2021-01-01 RX ADMIN — PROPOFOL 50 MCG/KG/MIN: 10 INJECTION, EMULSION INTRAVENOUS at 15:12

## 2021-01-01 RX ADMIN — ZINC SULFATE 220 MG (50 MG) CAPSULE 1 CAPSULE: CAPSULE at 08:36

## 2021-01-01 RX ADMIN — MINERAL OIL AND PETROLATUM: 150; 830 OINTMENT OPHTHALMIC at 20:08

## 2021-01-01 RX ADMIN — Medication 300 MCG/HR: at 10:09

## 2021-01-01 RX ADMIN — DIAZEPAM 5 MG: 5 TABLET ORAL at 18:20

## 2021-01-01 RX ADMIN — Medication 300 MCG/HR: at 19:07

## 2021-01-01 RX ADMIN — INSULIN LISPRO 2 UNITS: 100 INJECTION, SOLUTION INTRAVENOUS; SUBCUTANEOUS at 17:47

## 2021-01-01 RX ADMIN — PROPOFOL 50 MCG/KG/MIN: 10 INJECTION, EMULSION INTRAVENOUS at 06:00

## 2021-01-01 RX ADMIN — MIDAZOLAM 20 MG/HR: 5 INJECTION, SOLUTION INTRAMUSCULAR; INTRAVENOUS at 01:34

## 2021-01-01 RX ADMIN — CHLORHEXIDINE GLUCONATE 15 ML: 0.12 RINSE ORAL at 20:54

## 2021-01-01 RX ADMIN — DEXMEDETOMIDINE HYDROCHLORIDE 1.5 MCG/KG/HR: 400 INJECTION INTRAVENOUS at 08:24

## 2021-01-01 RX ADMIN — DEXMEDETOMIDINE HYDROCHLORIDE 1.5 MCG/KG/HR: 400 INJECTION INTRAVENOUS at 19:33

## 2021-01-01 RX ADMIN — DEXMEDETOMIDINE HYDROCHLORIDE 1.5 MCG/KG/HR: 400 INJECTION INTRAVENOUS at 18:51

## 2021-01-01 RX ADMIN — HUMAN INSULIN 10 UNITS: 100 INJECTION, SOLUTION SUBCUTANEOUS at 06:52

## 2021-01-01 RX ADMIN — DIAZEPAM 5 MG: 5 TABLET ORAL at 03:31

## 2021-01-01 RX ADMIN — ENOXAPARIN SODIUM 40 MG: 100 INJECTION SUBCUTANEOUS at 14:31

## 2021-01-01 RX ADMIN — Medication 300 MCG/HR: at 15:00

## 2021-01-01 RX ADMIN — PROPOFOL 50 MCG/KG/MIN: 10 INJECTION, EMULSION INTRAVENOUS at 18:46

## 2021-01-01 RX ADMIN — INSULIN LISPRO 3 UNITS: 100 INJECTION, SOLUTION INTRAVENOUS; SUBCUTANEOUS at 13:45

## 2021-01-01 RX ADMIN — DEXMEDETOMIDINE HYDROCHLORIDE 1.5 MCG/KG/HR: 400 INJECTION INTRAVENOUS at 20:59

## 2021-01-01 RX ADMIN — Medication 10 ML: at 06:17

## 2021-01-01 RX ADMIN — FUROSEMIDE 40 MG: 10 INJECTION, SOLUTION INTRAMUSCULAR; INTRAVENOUS at 16:21

## 2021-01-01 RX ADMIN — BENZONATATE 200 MG: 100 CAPSULE ORAL at 16:49

## 2021-01-01 RX ADMIN — ZINC SULFATE 220 MG (50 MG) CAPSULE 1 CAPSULE: CAPSULE at 09:19

## 2021-01-01 RX ADMIN — MINERAL OIL AND PETROLATUM: 150; 830 OINTMENT OPHTHALMIC at 09:17

## 2021-01-01 RX ADMIN — ENOXAPARIN SODIUM 40 MG: 100 INJECTION SUBCUTANEOUS at 03:12

## 2021-01-01 RX ADMIN — REMDESIVIR 100 MG: 5 INJECTION INTRAVENOUS at 13:54

## 2021-01-01 RX ADMIN — DIAZEPAM 20 MG: 5 TABLET ORAL at 02:00

## 2021-01-01 RX ADMIN — FAMOTIDINE 20 MG: 10 INJECTION, SOLUTION INTRAVENOUS at 21:38

## 2021-01-01 RX ADMIN — SODIUM ZIRCONIUM CYCLOSILICATE 10 G: 10 POWDER, FOR SUSPENSION ORAL at 08:19

## 2021-01-01 RX ADMIN — ENOXAPARIN SODIUM 40 MG: 100 INJECTION SUBCUTANEOUS at 15:37

## 2021-01-01 RX ADMIN — METHYLPREDNISOLONE SODIUM SUCCINATE 20 MG: 40 INJECTION, POWDER, FOR SOLUTION INTRAMUSCULAR; INTRAVENOUS at 03:57

## 2021-01-01 RX ADMIN — Medication 17.2 MG: at 09:19

## 2021-01-01 RX ADMIN — ZINC SULFATE 220 MG (50 MG) CAPSULE 1 CAPSULE: CAPSULE at 09:16

## 2021-01-01 RX ADMIN — Medication 10 ML: at 21:01

## 2021-01-01 RX ADMIN — METHYLPREDNISOLONE SODIUM SUCCINATE 20 MG: 40 INJECTION, POWDER, FOR SOLUTION INTRAMUSCULAR; INTRAVENOUS at 03:32

## 2021-01-01 RX ADMIN — Medication 10 ML: at 21:49

## 2021-01-01 RX ADMIN — DEXMEDETOMIDINE HYDROCHLORIDE 1.5 MCG/KG/HR: 400 INJECTION INTRAVENOUS at 09:06

## 2021-01-01 RX ADMIN — MINERAL OIL AND PETROLATUM: 150; 830 OINTMENT OPHTHALMIC at 09:30

## 2021-01-01 RX ADMIN — Medication 17.2 MG: at 09:24

## 2021-01-01 RX ADMIN — PROPOFOL 50 MCG/KG/MIN: 10 INJECTION, EMULSION INTRAVENOUS at 02:15

## 2021-01-01 RX ADMIN — INSULIN LISPRO 3 UNITS: 100 INJECTION, SOLUTION INTRAVENOUS; SUBCUTANEOUS at 12:00

## 2021-01-01 RX ADMIN — INSULIN LISPRO 2 UNITS: 100 INJECTION, SOLUTION INTRAVENOUS; SUBCUTANEOUS at 00:30

## 2021-01-01 RX ADMIN — CISATRACURIUM BESYLATE 3.5 MCG/KG/MIN: 2 INJECTION INTRAVENOUS at 19:34

## 2021-01-01 RX ADMIN — MIDAZOLAM 20 MG/HR: 5 INJECTION, SOLUTION INTRAMUSCULAR; INTRAVENOUS at 09:18

## 2021-01-01 RX ADMIN — Medication 5 ML: at 14:27

## 2021-01-01 RX ADMIN — HUMAN INSULIN 5 UNITS: 100 INJECTION, SUSPENSION SUBCUTANEOUS at 20:05

## 2021-01-01 RX ADMIN — CHLORTHALIDONE 25 MG: 25 TABLET ORAL at 11:47

## 2021-01-01 RX ADMIN — Medication 10 ML: at 13:33

## 2021-01-01 RX ADMIN — DEXTROSE MONOHYDRATE 25 ML/HR: 50 INJECTION, SOLUTION INTRAVENOUS at 07:00

## 2021-01-01 RX ADMIN — PROPOFOL 50 MCG/KG/MIN: 10 INJECTION, EMULSION INTRAVENOUS at 10:03

## 2021-01-01 RX ADMIN — DEXMEDETOMIDINE HYDROCHLORIDE 1.5 MCG/KG/HR: 400 INJECTION INTRAVENOUS at 02:15

## 2021-01-01 RX ADMIN — INSULIN LISPRO 2 UNITS: 100 INJECTION, SOLUTION INTRAVENOUS; SUBCUTANEOUS at 11:54

## 2021-01-01 RX ADMIN — METHYLPREDNISOLONE SODIUM SUCCINATE 20 MG: 40 INJECTION, POWDER, FOR SOLUTION INTRAMUSCULAR; INTRAVENOUS at 09:00

## 2021-01-01 RX ADMIN — DEXMEDETOMIDINE HYDROCHLORIDE 1.5 MCG/KG/HR: 400 INJECTION INTRAVENOUS at 10:46

## 2021-01-01 RX ADMIN — INSULIN LISPRO 2 UNITS: 100 INJECTION, SOLUTION INTRAVENOUS; SUBCUTANEOUS at 11:19

## 2021-01-01 RX ADMIN — DEXMEDETOMIDINE HYDROCHLORIDE 1.5 MCG/KG/HR: 400 INJECTION INTRAVENOUS at 00:42

## 2021-01-01 RX ADMIN — GUAIFENESIN AND CODEINE PHOSPHATE 10 ML: 100; 10 SOLUTION ORAL at 06:44

## 2021-01-01 RX ADMIN — MINERAL OIL AND PETROLATUM: 150; 830 OINTMENT OPHTHALMIC at 09:04

## 2021-01-01 RX ADMIN — MINERAL OIL AND PETROLATUM: 150; 830 OINTMENT OPHTHALMIC at 20:55

## 2021-01-01 RX ADMIN — ENOXAPARIN SODIUM 40 MG: 100 INJECTION SUBCUTANEOUS at 03:13

## 2021-01-01 RX ADMIN — Medication 10 ML: at 21:09

## 2021-01-01 RX ADMIN — PROPOFOL 50 MCG/KG/MIN: 10 INJECTION, EMULSION INTRAVENOUS at 06:41

## 2021-01-01 RX ADMIN — Medication 300 MCG/HR: at 18:08

## 2021-01-01 RX ADMIN — ENOXAPARIN SODIUM 40 MG: 100 INJECTION SUBCUTANEOUS at 04:28

## 2021-01-01 RX ADMIN — Medication 10 ML: at 22:37

## 2021-01-01 RX ADMIN — OXYCODONE 15 MG: 5 TABLET ORAL at 02:05

## 2021-01-01 RX ADMIN — Medication 10 ML: at 06:07

## 2021-01-01 RX ADMIN — DIAZEPAM 5 MG: 5 TABLET ORAL at 10:57

## 2021-01-01 RX ADMIN — DOCUSATE SODIUM 100 MG: 100 CAPSULE, LIQUID FILLED ORAL at 09:58

## 2021-01-01 RX ADMIN — PROPOFOL 50 MCG/KG/MIN: 10 INJECTION, EMULSION INTRAVENOUS at 00:05

## 2021-01-01 RX ADMIN — BENZONATATE 200 MG: 100 CAPSULE ORAL at 12:01

## 2021-01-01 RX ADMIN — CHLORHEXIDINE GLUCONATE 15 ML: 0.12 RINSE ORAL at 20:21

## 2021-01-01 RX ADMIN — Medication 10 ML: at 15:04

## 2021-01-01 RX ADMIN — PROPOFOL 50 MCG/KG/MIN: 10 INJECTION, EMULSION INTRAVENOUS at 06:01

## 2021-01-01 RX ADMIN — REMDESIVIR 100 MG: 5 INJECTION INTRAVENOUS at 14:13

## 2021-01-01 RX ADMIN — HUMAN INSULIN 5 UNITS: 100 INJECTION, SUSPENSION SUBCUTANEOUS at 09:16

## 2021-01-01 RX ADMIN — METHYLPREDNISOLONE SODIUM SUCCINATE 40 MG: 40 INJECTION, POWDER, FOR SOLUTION INTRAMUSCULAR; INTRAVENOUS at 09:09

## 2021-01-01 RX ADMIN — DEXMEDETOMIDINE HYDROCHLORIDE 1.5 MCG/KG/HR: 400 INJECTION INTRAVENOUS at 15:33

## 2021-01-01 RX ADMIN — DEXMEDETOMIDINE HYDROCHLORIDE 1.5 MCG/KG/HR: 400 INJECTION INTRAVENOUS at 03:15

## 2021-01-01 RX ADMIN — Medication 250 MCG/HR: at 02:28

## 2021-01-01 RX ADMIN — DEXMEDETOMIDINE HYDROCHLORIDE 1.5 MCG/KG/HR: 400 INJECTION INTRAVENOUS at 23:43

## 2021-01-01 RX ADMIN — OXYCODONE 20 MG: 5 TABLET ORAL at 18:46

## 2021-01-01 RX ADMIN — Medication 10 ML: at 06:12

## 2021-01-01 RX ADMIN — METHYLPREDNISOLONE SODIUM SUCCINATE 20 MG: 40 INJECTION, POWDER, FOR SOLUTION INTRAMUSCULAR; INTRAVENOUS at 04:10

## 2021-01-01 RX ADMIN — BUMETANIDE 1 MG: 0.25 INJECTION, SOLUTION INTRAMUSCULAR; INTRAVENOUS at 20:20

## 2021-01-01 RX ADMIN — ACETAMINOPHEN 650 MG: 325 TABLET ORAL at 19:35

## 2021-01-01 RX ADMIN — PROPOFOL 50 MCG/KG/MIN: 10 INJECTION, EMULSION INTRAVENOUS at 16:39

## 2021-01-01 RX ADMIN — SODIUM CHLORIDE 75 ML/HR: 9 INJECTION, SOLUTION INTRAVENOUS at 16:49

## 2021-01-01 RX ADMIN — BENZONATATE 200 MG: 100 CAPSULE ORAL at 12:29

## 2021-01-01 RX ADMIN — DEXMEDETOMIDINE HYDROCHLORIDE 1.5 MCG/KG/HR: 400 INJECTION INTRAVENOUS at 18:35

## 2021-01-01 RX ADMIN — CISATRACURIUM BESYLATE 3 MCG/KG/MIN: 2 INJECTION INTRAVENOUS at 13:45

## 2021-01-01 RX ADMIN — SODIUM CHLORIDE 75 ML/HR: 9 INJECTION, SOLUTION INTRAVENOUS at 07:02

## 2021-01-01 RX ADMIN — MIDAZOLAM 20 MG/HR: 5 INJECTION, SOLUTION INTRAMUSCULAR; INTRAVENOUS at 16:57

## 2021-01-01 RX ADMIN — DEXMEDETOMIDINE HYDROCHLORIDE 1.5 MCG/KG/HR: 400 INJECTION INTRAVENOUS at 19:38

## 2021-01-01 RX ADMIN — DEXMEDETOMIDINE HYDROCHLORIDE 1.5 MCG/KG/HR: 400 INJECTION INTRAVENOUS at 13:22

## 2021-01-01 RX ADMIN — FENTANYL CITRATE 50 MCG: 50 INJECTION, SOLUTION INTRAMUSCULAR; INTRAVENOUS at 06:28

## 2021-01-01 RX ADMIN — DEXMEDETOMIDINE HYDROCHLORIDE 1.5 MCG/KG/HR: 400 INJECTION INTRAVENOUS at 07:11

## 2021-01-01 RX ADMIN — PROPOFOL 50 MCG/KG/MIN: 10 INJECTION, EMULSION INTRAVENOUS at 13:41

## 2021-01-01 RX ADMIN — DEXMEDETOMIDINE HYDROCHLORIDE 0.4 MCG/KG/HR: 400 INJECTION INTRAVENOUS at 23:01

## 2021-01-01 RX ADMIN — ALBUTEROL SULFATE 2 PUFF: 90 AEROSOL, METERED RESPIRATORY (INHALATION) at 21:07

## 2021-01-01 RX ADMIN — METHYLPREDNISOLONE SODIUM SUCCINATE 20 MG: 40 INJECTION, POWDER, FOR SOLUTION INTRAMUSCULAR; INTRAVENOUS at 20:21

## 2021-01-01 RX ADMIN — INSULIN LISPRO 2 UNITS: 100 INJECTION, SOLUTION INTRAVENOUS; SUBCUTANEOUS at 07:11

## 2021-01-01 RX ADMIN — NOREPINEPHRINE BITARTRATE 4 MCG/MIN: 1 INJECTION, SOLUTION, CONCENTRATE INTRAVENOUS at 17:44

## 2021-01-01 RX ADMIN — PROPOFOL 50 MCG/KG/MIN: 10 INJECTION, EMULSION INTRAVENOUS at 23:24

## 2021-01-01 RX ADMIN — Medication 17.2 MG: at 10:15

## 2021-01-01 RX ADMIN — CHLORHEXIDINE GLUCONATE 15 ML: 0.12 RINSE ORAL at 20:08

## 2021-01-01 RX ADMIN — FAMOTIDINE 20 MG: 10 INJECTION, SOLUTION INTRAVENOUS at 20:51

## 2021-01-01 RX ADMIN — ALBUTEROL SULFATE 2 PUFF: 90 AEROSOL, METERED RESPIRATORY (INHALATION) at 16:50

## 2021-01-01 RX ADMIN — DEXMEDETOMIDINE HYDROCHLORIDE 1.5 MCG/KG/HR: 400 INJECTION INTRAVENOUS at 10:55

## 2021-01-01 RX ADMIN — CISATRACURIUM BESYLATE 2.5 MCG/KG/MIN: 2 INJECTION INTRAVENOUS at 11:52

## 2021-01-01 RX ADMIN — CHLORHEXIDINE GLUCONATE 15 ML: 0.12 RINSE ORAL at 20:43

## 2021-01-01 RX ADMIN — LEUCINE, PHENYLALANINE, LYSINE, METHIONINE, ISOLEUCINE, VALINE, HISTIDINE, THREONINE, TRYPTOPHAN, ALANINE, GLYCINE, ARGININE, PROLINE, SERINE, TYROSINE, SODIUM ACETATE, DIBASIC POTASSIUM PHOSPHATE, MAGNESIUM CHLORIDE, SODIUM CHLORIDE, CALCIUM CHLORIDE, DEXTROSE
311; 238; 247; 170; 255; 247; 204; 179; 77; 880; 438; 489; 289; 213; 17; 297; 261; 51; 77; 33; 10 INJECTION INTRAVENOUS at 21:30

## 2021-01-01 RX ADMIN — CISATRACURIUM BESYLATE 3 MCG/KG/MIN: 2 INJECTION INTRAVENOUS at 10:45

## 2021-01-01 RX ADMIN — VANCOMYCIN HYDROCHLORIDE 2500 MG: 10 INJECTION, POWDER, LYOPHILIZED, FOR SOLUTION INTRAVENOUS at 15:29

## 2021-01-01 RX ADMIN — CISATRACURIUM BESYLATE 3 MCG/KG/MIN: 2 INJECTION INTRAVENOUS at 10:00

## 2021-01-01 RX ADMIN — OXYCODONE 20 MG: 5 TABLET ORAL at 02:01

## 2021-01-01 RX ADMIN — METHYLPREDNISOLONE SODIUM SUCCINATE 40 MG: 40 INJECTION, POWDER, FOR SOLUTION INTRAMUSCULAR; INTRAVENOUS at 05:00

## 2021-01-01 RX ADMIN — METHYLPREDNISOLONE SODIUM SUCCINATE 40 MG: 40 INJECTION, POWDER, FOR SOLUTION INTRAMUSCULAR; INTRAVENOUS at 09:00

## 2021-01-01 RX ADMIN — DEXMEDETOMIDINE HYDROCHLORIDE 1.5 MCG/KG/HR: 400 INJECTION INTRAVENOUS at 04:39

## 2021-01-01 RX ADMIN — MINERAL OIL AND PETROLATUM: 150; 830 OINTMENT OPHTHALMIC at 21:00

## 2021-01-01 RX ADMIN — Medication 300 MCG/HR: at 12:06

## 2021-01-01 RX ADMIN — DEXMEDETOMIDINE HYDROCHLORIDE 1.5 MCG/KG/HR: 400 INJECTION INTRAVENOUS at 13:39

## 2021-01-01 RX ADMIN — MIDAZOLAM 7 MG/HR: 5 INJECTION, SOLUTION INTRAMUSCULAR; INTRAVENOUS at 18:50

## 2021-01-01 RX ADMIN — Medication 300 MCG/HR: at 12:50

## 2021-01-01 RX ADMIN — DEXMEDETOMIDINE HYDROCHLORIDE 1.5 MCG/KG/HR: 400 INJECTION INTRAVENOUS at 02:34

## 2021-01-01 RX ADMIN — METHYLPREDNISOLONE SODIUM SUCCINATE 40 MG: 40 INJECTION, POWDER, FOR SOLUTION INTRAMUSCULAR; INTRAVENOUS at 18:43

## 2021-01-01 RX ADMIN — MIDAZOLAM 20 MG/HR: 5 INJECTION, SOLUTION INTRAMUSCULAR; INTRAVENOUS at 07:16

## 2021-01-01 RX ADMIN — MIDAZOLAM 15 MG/HR: 5 INJECTION, SOLUTION INTRAMUSCULAR; INTRAVENOUS at 21:45

## 2021-01-01 RX ADMIN — BUMETANIDE 1 MG: 0.25 INJECTION, SOLUTION INTRAMUSCULAR; INTRAVENOUS at 08:43

## 2021-01-01 RX ADMIN — PROPOFOL 50 MCG/KG/MIN: 10 INJECTION, EMULSION INTRAVENOUS at 05:39

## 2021-01-01 RX ADMIN — CALCIUM GLUCONATE 1000 MG: 20 INJECTION, SOLUTION INTRAVENOUS at 06:53

## 2021-01-01 RX ADMIN — DEXMEDETOMIDINE HYDROCHLORIDE 1.5 MCG/KG/HR: 400 INJECTION INTRAVENOUS at 02:00

## 2021-01-01 RX ADMIN — MIDAZOLAM 13 MG/HR: 5 INJECTION, SOLUTION INTRAMUSCULAR; INTRAVENOUS at 04:40

## 2021-01-01 RX ADMIN — Medication 300 MCG/HR: at 04:40

## 2021-01-01 RX ADMIN — ENOXAPARIN SODIUM 40 MG: 100 INJECTION SUBCUTANEOUS at 03:20

## 2021-01-01 RX ADMIN — PROPOFOL 50 MCG/KG/MIN: 10 INJECTION, EMULSION INTRAVENOUS at 15:25

## 2021-01-01 RX ADMIN — DEXMEDETOMIDINE HYDROCHLORIDE 1.4 MCG/KG/HR: 400 INJECTION INTRAVENOUS at 20:51

## 2021-01-01 RX ADMIN — PROPOFOL 50 MCG/KG/MIN: 10 INJECTION, EMULSION INTRAVENOUS at 10:24

## 2021-01-01 RX ADMIN — OXYCODONE HYDROCHLORIDE AND ACETAMINOPHEN 500 MG: 500 TABLET ORAL at 21:24

## 2021-01-01 RX ADMIN — PROPOFOL 50 MCG/KG/MIN: 10 INJECTION, EMULSION INTRAVENOUS at 13:32

## 2021-01-01 RX ADMIN — PROPOFOL 50 MCG/KG/MIN: 10 INJECTION, EMULSION INTRAVENOUS at 16:02

## 2021-01-01 RX ADMIN — INSULIN LISPRO 3 UNITS: 100 INJECTION, SOLUTION INTRAVENOUS; SUBCUTANEOUS at 06:55

## 2021-01-01 RX ADMIN — MINERAL OIL AND PETROLATUM: 150; 830 OINTMENT OPHTHALMIC at 09:14

## 2021-01-01 RX ADMIN — Medication 2000 UNITS: at 08:36

## 2021-01-01 RX ADMIN — MIDAZOLAM 20 MG/HR: 5 INJECTION, SOLUTION INTRAMUSCULAR; INTRAVENOUS at 06:05

## 2021-01-01 RX ADMIN — FAMOTIDINE 20 MG: 10 INJECTION, SOLUTION INTRAVENOUS at 09:00

## 2021-01-01 RX ADMIN — DEXMEDETOMIDINE HYDROCHLORIDE 1.5 MCG/KG/HR: 400 INJECTION INTRAVENOUS at 06:41

## 2021-01-01 RX ADMIN — INSULIN LISPRO 2 UNITS: 100 INJECTION, SOLUTION INTRAVENOUS; SUBCUTANEOUS at 07:04

## 2021-01-01 RX ADMIN — VANCOMYCIN HYDROCHLORIDE 1250 MG: 10 INJECTION, POWDER, LYOPHILIZED, FOR SOLUTION INTRAVENOUS at 04:59

## 2021-01-01 RX ADMIN — OXYCODONE HYDROCHLORIDE AND ACETAMINOPHEN 500 MG: 500 TABLET ORAL at 10:05

## 2021-01-01 RX ADMIN — FAMOTIDINE 20 MG: 10 INJECTION, SOLUTION INTRAVENOUS at 20:18

## 2021-01-01 RX ADMIN — Medication 10 ML: at 20:51

## 2021-01-01 RX ADMIN — Medication 10 ML: at 14:13

## 2021-01-01 RX ADMIN — DEXMEDETOMIDINE HYDROCHLORIDE 1.4 MCG/KG/HR: 400 INJECTION INTRAVENOUS at 23:45

## 2021-01-01 RX ADMIN — BENZONATATE 200 MG: 100 CAPSULE ORAL at 10:05

## 2021-01-01 RX ADMIN — OXYCODONE HYDROCHLORIDE AND ACETAMINOPHEN 500 MG: 500 TABLET ORAL at 09:58

## 2021-01-01 RX ADMIN — GUAIFENESIN AND CODEINE PHOSPHATE 10 ML: 100; 10 SOLUTION ORAL at 18:21

## 2021-01-01 RX ADMIN — SODIUM ZIRCONIUM CYCLOSILICATE 10 G: 10 POWDER, FOR SUSPENSION ORAL at 13:33

## 2021-01-01 RX ADMIN — Medication 300 MCG/HR: at 20:15

## 2021-01-01 RX ADMIN — DEXTROMETHORPHAN 30 MG: 30 SUSPENSION, EXTENDED RELEASE ORAL at 04:51

## 2021-01-01 RX ADMIN — CISATRACURIUM BESYLATE 3 MCG/KG/MIN: 2 INJECTION INTRAVENOUS at 14:46

## 2021-01-01 RX ADMIN — Medication 10 ML: at 06:50

## 2021-01-01 RX ADMIN — ALBUMIN (HUMAN) 25 G: 0.25 INJECTION, SOLUTION INTRAVENOUS at 09:06

## 2021-01-01 RX ADMIN — CEFEPIME HYDROCHLORIDE 2 G: 2 INJECTION, POWDER, FOR SOLUTION INTRAVENOUS at 09:12

## 2021-01-01 RX ADMIN — CHLORTHALIDONE 25 MG: 25 TABLET ORAL at 09:16

## 2021-01-01 RX ADMIN — DEXMEDETOMIDINE HYDROCHLORIDE 1.5 MCG/KG/HR: 400 INJECTION INTRAVENOUS at 02:43

## 2021-01-01 RX ADMIN — Medication 10 ML: at 08:08

## 2021-01-01 RX ADMIN — MIDAZOLAM 20 MG/HR: 5 INJECTION, SOLUTION INTRAMUSCULAR; INTRAVENOUS at 03:00

## 2021-01-01 RX ADMIN — Medication 10 ML: at 15:01

## 2021-01-01 RX ADMIN — CEFEPIME HYDROCHLORIDE 2 G: 2 INJECTION, POWDER, FOR SOLUTION INTRAVENOUS at 16:30

## 2021-01-01 RX ADMIN — Medication 300 MCG/HR: at 06:46

## 2021-01-01 RX ADMIN — CEFEPIME HYDROCHLORIDE 2 G: 2 INJECTION, POWDER, FOR SOLUTION INTRAVENOUS at 22:01

## 2021-01-01 RX ADMIN — CISATRACURIUM BESYLATE 3 MCG/KG/MIN: 2 INJECTION INTRAVENOUS at 16:58

## 2021-01-01 RX ADMIN — BENZONATATE 200 MG: 100 CAPSULE ORAL at 09:27

## 2021-01-01 RX ADMIN — FAMOTIDINE 20 MG: 10 INJECTION, SOLUTION INTRAVENOUS at 20:33

## 2021-01-01 RX ADMIN — Medication 3 MG: at 22:16

## 2021-01-01 RX ADMIN — Medication 2000 UNITS: at 09:21

## 2021-01-01 RX ADMIN — ACETAMINOPHEN 650 MG: 325 TABLET ORAL at 15:35

## 2021-01-01 RX ADMIN — GUAIFENESIN 1200 MG: 600 TABLET, EXTENDED RELEASE ORAL at 18:42

## 2021-01-01 RX ADMIN — PROPOFOL 50 MCG/KG/MIN: 10 INJECTION, EMULSION INTRAVENOUS at 09:35

## 2021-01-01 RX ADMIN — DEXMEDETOMIDINE HYDROCHLORIDE 1.5 MCG/KG/HR: 400 INJECTION INTRAVENOUS at 08:04

## 2021-01-01 RX ADMIN — MIDAZOLAM 7 MG/HR: 5 INJECTION, SOLUTION INTRAMUSCULAR; INTRAVENOUS at 08:59

## 2021-01-01 RX ADMIN — DEXMEDETOMIDINE HYDROCHLORIDE 1.5 MCG/KG/HR: 400 INJECTION INTRAVENOUS at 23:19

## 2021-01-01 RX ADMIN — FAMOTIDINE 20 MG: 10 INJECTION, SOLUTION INTRAVENOUS at 09:15

## 2021-01-01 RX ADMIN — Medication 17.2 MG: at 08:00

## 2021-01-01 RX ADMIN — Medication 10 ML: at 05:28

## 2021-01-01 RX ADMIN — DEXMEDETOMIDINE HYDROCHLORIDE 1.5 MCG/KG/HR: 400 INJECTION INTRAVENOUS at 08:43

## 2021-01-01 RX ADMIN — HUMAN INSULIN 10 UNITS: 100 INJECTION, SOLUTION SUBCUTANEOUS at 13:32

## 2021-01-01 RX ADMIN — MIDAZOLAM 20 MG/HR: 5 INJECTION, SOLUTION INTRAMUSCULAR; INTRAVENOUS at 23:44

## 2021-01-01 RX ADMIN — METHYLPREDNISOLONE SODIUM SUCCINATE 40 MG: 40 INJECTION, POWDER, FOR SOLUTION INTRAMUSCULAR; INTRAVENOUS at 09:12

## 2021-01-01 RX ADMIN — DEXMEDETOMIDINE HYDROCHLORIDE 1.5 MCG/KG/HR: 400 INJECTION INTRAVENOUS at 03:21

## 2021-01-01 RX ADMIN — DEXMEDETOMIDINE HYDROCHLORIDE 1.5 MCG/KG/HR: 400 INJECTION INTRAVENOUS at 10:56

## 2021-01-01 RX ADMIN — SODIUM CHLORIDE 150 ML/HR: 9 INJECTION, SOLUTION INTRAVENOUS at 13:46

## 2021-01-01 RX ADMIN — PROPOFOL 50 MCG/KG/MIN: 10 INJECTION, EMULSION INTRAVENOUS at 12:10

## 2021-01-01 RX ADMIN — PROPOFOL 50 MCG/KG/MIN: 10 INJECTION, EMULSION INTRAVENOUS at 22:20

## 2021-01-01 RX ADMIN — DEXAMETHASONE SODIUM PHOSPHATE 6 MG: 4 INJECTION, SOLUTION INTRAMUSCULAR; INTRAVENOUS at 13:56

## 2021-01-01 RX ADMIN — DEXMEDETOMIDINE HYDROCHLORIDE 1.5 MCG/KG/HR: 400 INJECTION INTRAVENOUS at 20:32

## 2021-01-01 RX ADMIN — METHYLPREDNISOLONE SODIUM SUCCINATE 40 MG: 40 INJECTION, POWDER, FOR SOLUTION INTRAMUSCULAR; INTRAVENOUS at 21:02

## 2021-01-01 RX ADMIN — DEXMEDETOMIDINE HYDROCHLORIDE 1.5 MCG/KG/HR: 400 INJECTION INTRAVENOUS at 08:14

## 2021-01-01 RX ADMIN — INSULIN LISPRO 2 UNITS: 100 INJECTION, SOLUTION INTRAVENOUS; SUBCUTANEOUS at 12:45

## 2021-01-01 RX ADMIN — Medication 300 MCG/HR: at 11:37

## 2021-01-01 RX ADMIN — ROCURONIUM BROMIDE 50 MG: 10 INJECTION, SOLUTION INTRAVENOUS at 09:35

## 2021-01-01 RX ADMIN — BENZONATATE 200 MG: 100 CAPSULE ORAL at 08:45

## 2021-01-01 RX ADMIN — SODIUM ZIRCONIUM CYCLOSILICATE 10 G: 10 POWDER, FOR SUSPENSION ORAL at 14:05

## 2021-01-01 RX ADMIN — METHYLPREDNISOLONE SODIUM SUCCINATE 20 MG: 40 INJECTION, POWDER, FOR SOLUTION INTRAMUSCULAR; INTRAVENOUS at 08:59

## 2021-01-01 RX ADMIN — BENZONATATE 200 MG: 100 CAPSULE ORAL at 15:52

## 2021-01-01 RX ADMIN — PROPOFOL 50 MCG/KG/MIN: 10 INJECTION, EMULSION INTRAVENOUS at 19:05

## 2021-01-01 RX ADMIN — Medication: at 08:17

## 2021-01-01 RX ADMIN — HUMAN INSULIN 5 UNITS: 100 INJECTION, SUSPENSION SUBCUTANEOUS at 08:42

## 2021-01-01 RX ADMIN — MIDAZOLAM 20 MG/HR: 5 INJECTION, SOLUTION INTRAMUSCULAR; INTRAVENOUS at 14:40

## 2021-01-01 RX ADMIN — OXYCODONE HYDROCHLORIDE AND ACETAMINOPHEN 500 MG: 500 TABLET ORAL at 20:50

## 2021-01-01 RX ADMIN — Medication 300 MCG/HR: at 10:00

## 2021-01-01 RX ADMIN — Medication 300 MCG/HR: at 04:15

## 2021-01-01 RX ADMIN — NOREPINEPHRINE BITARTRATE 10 MCG/MIN: 1 INJECTION, SOLUTION, CONCENTRATE INTRAVENOUS at 06:59

## 2021-01-01 RX ADMIN — DEXMEDETOMIDINE HYDROCHLORIDE 1 MCG/KG/HR: 400 INJECTION INTRAVENOUS at 03:06

## 2021-01-01 RX ADMIN — METHYLPREDNISOLONE SODIUM SUCCINATE 40 MG: 40 INJECTION, POWDER, FOR SOLUTION INTRAMUSCULAR; INTRAVENOUS at 03:32

## 2021-01-01 RX ADMIN — CEFEPIME HYDROCHLORIDE 2 G: 2 INJECTION, POWDER, FOR SOLUTION INTRAVENOUS at 22:54

## 2021-01-01 RX ADMIN — ALBUTEROL SULFATE 2 PUFF: 90 AEROSOL, METERED RESPIRATORY (INHALATION) at 08:45

## 2021-01-01 RX ADMIN — OXYCODONE 20 MG: 5 TABLET ORAL at 11:12

## 2021-01-01 RX ADMIN — DEXMEDETOMIDINE HYDROCHLORIDE 1.3 MCG/KG/HR: 400 INJECTION INTRAVENOUS at 09:15

## 2021-01-01 RX ADMIN — MIDAZOLAM HYDROCHLORIDE 5 MG: 1 INJECTION, SOLUTION INTRAMUSCULAR; INTRAVENOUS at 08:27

## 2021-01-01 RX ADMIN — PROPOFOL 50 MCG/KG/MIN: 10 INJECTION, EMULSION INTRAVENOUS at 22:04

## 2021-01-01 RX ADMIN — PROPOFOL 50 MCG/KG/MIN: 10 INJECTION, EMULSION INTRAVENOUS at 13:15

## 2021-01-01 RX ADMIN — BUMETANIDE 1 MG: 0.25 INJECTION, SOLUTION INTRAMUSCULAR; INTRAVENOUS at 09:27

## 2021-01-01 RX ADMIN — ENOXAPARIN SODIUM 40 MG: 100 INJECTION SUBCUTANEOUS at 14:59

## 2021-01-01 RX ADMIN — MIDAZOLAM 20 MG/HR: 5 INJECTION, SOLUTION INTRAMUSCULAR; INTRAVENOUS at 08:16

## 2021-01-01 RX ADMIN — INSULIN LISPRO 2 UNITS: 100 INJECTION, SOLUTION INTRAVENOUS; SUBCUTANEOUS at 00:00

## 2021-01-01 RX ADMIN — BENZONATATE 200 MG: 100 CAPSULE ORAL at 08:36

## 2021-01-01 RX ADMIN — CHLORHEXIDINE GLUCONATE 15 ML: 0.12 RINSE ORAL at 09:29

## 2021-01-01 RX ADMIN — MINERAL OIL AND PETROLATUM: 150; 830 OINTMENT OPHTHALMIC at 08:02

## 2021-01-01 RX ADMIN — CHLORHEXIDINE GLUCONATE 15 ML: 0.12 RINSE ORAL at 08:22

## 2021-01-01 RX ADMIN — BENZONATATE 200 MG: 100 CAPSULE ORAL at 11:21

## 2021-01-01 RX ADMIN — INSULIN LISPRO 2 UNITS: 100 INJECTION, SOLUTION INTRAVENOUS; SUBCUTANEOUS at 17:34

## 2021-01-01 RX ADMIN — ENOXAPARIN SODIUM 40 MG: 100 INJECTION SUBCUTANEOUS at 14:47

## 2021-01-01 RX ADMIN — Medication: at 16:36

## 2021-01-01 RX ADMIN — CHLORHEXIDINE GLUCONATE 15 ML: 0.12 RINSE ORAL at 20:51

## 2021-01-01 RX ADMIN — Medication 2000 UNITS: at 09:16

## 2021-01-01 RX ADMIN — PROPOFOL 50 MCG/KG/MIN: 10 INJECTION, EMULSION INTRAVENOUS at 00:49

## 2021-01-01 RX ADMIN — Medication 10 ML: at 15:42

## 2021-01-01 RX ADMIN — DEXMEDETOMIDINE HYDROCHLORIDE 1.5 MCG/KG/HR: 400 INJECTION INTRAVENOUS at 05:36

## 2021-01-01 RX ADMIN — VASOPRESSIN 0.04 UNITS/MIN: 20 INJECTION INTRAVENOUS at 20:53

## 2021-01-01 RX ADMIN — REMDESIVIR 100 MG: 5 INJECTION INTRAVENOUS at 14:01

## 2021-01-01 RX ADMIN — DEXMEDETOMIDINE HYDROCHLORIDE 1.5 MCG/KG/HR: 400 INJECTION INTRAVENOUS at 05:39

## 2021-01-01 RX ADMIN — METHYLPREDNISOLONE SODIUM SUCCINATE 20 MG: 40 INJECTION, POWDER, FOR SOLUTION INTRAMUSCULAR; INTRAVENOUS at 09:28

## 2021-01-01 RX ADMIN — CEFEPIME HYDROCHLORIDE 2 G: 2 INJECTION, POWDER, FOR SOLUTION INTRAVENOUS at 09:32

## 2021-01-01 RX ADMIN — Medication 10 ML: at 13:41

## 2021-01-01 RX ADMIN — OXYCODONE HYDROCHLORIDE AND ACETAMINOPHEN 500 MG: 500 TABLET ORAL at 21:10

## 2021-01-01 RX ADMIN — METHYLPREDNISOLONE SODIUM SUCCINATE 40 MG: 40 INJECTION, POWDER, FOR SOLUTION INTRAMUSCULAR; INTRAVENOUS at 16:14

## 2021-01-01 RX ADMIN — CHLORHEXIDINE GLUCONATE 15 ML: 0.12 RINSE ORAL at 09:00

## 2021-01-01 RX ADMIN — Medication 300 MCG/HR: at 23:10

## 2021-01-01 RX ADMIN — HUMAN INSULIN 5 UNITS: 100 INJECTION, SUSPENSION SUBCUTANEOUS at 20:54

## 2021-01-01 RX ADMIN — MINERAL OIL AND PETROLATUM: 150; 830 OINTMENT OPHTHALMIC at 09:16

## 2021-01-01 RX ADMIN — Medication 10 ML: at 21:23

## 2021-01-01 RX ADMIN — ALBUTEROL SULFATE 2 PUFF: 90 AEROSOL, METERED RESPIRATORY (INHALATION) at 03:54

## 2021-01-01 RX ADMIN — MINERAL OIL AND PETROLATUM: 150; 830 OINTMENT OPHTHALMIC at 12:27

## 2021-01-01 RX ADMIN — Medication 300 MCG/HR: at 16:58

## 2021-01-01 RX ADMIN — PROPOFOL 30 MCG/KG/MIN: 10 INJECTION, EMULSION INTRAVENOUS at 09:16

## 2021-01-01 RX ADMIN — PROPOFOL 50 MCG/KG/MIN: 10 INJECTION, EMULSION INTRAVENOUS at 06:36

## 2021-01-01 RX ADMIN — BENZONATATE 200 MG: 100 CAPSULE ORAL at 12:32

## 2021-01-01 RX ADMIN — OXYCODONE HYDROCHLORIDE AND ACETAMINOPHEN 500 MG: 500 TABLET ORAL at 20:24

## 2021-01-01 RX ADMIN — PROPOFOL 50 MCG/KG/MIN: 10 INJECTION, EMULSION INTRAVENOUS at 02:43

## 2021-01-01 RX ADMIN — MINERAL OIL AND PETROLATUM: 150; 830 OINTMENT OPHTHALMIC at 20:05

## 2021-01-01 RX ADMIN — DOCUSATE SODIUM 100 MG: 50 LIQUID ORAL at 09:15

## 2021-01-01 RX ADMIN — DEXMEDETOMIDINE HYDROCHLORIDE 1.5 MCG/KG/HR: 400 INJECTION INTRAVENOUS at 00:04

## 2021-01-01 RX ADMIN — CHLOROTHIAZIDE SODIUM 500 MG: 500 INJECTION, POWDER, LYOPHILIZED, FOR SOLUTION INTRAVENOUS at 09:11

## 2021-01-01 RX ADMIN — DIAZEPAM 5 MG: 5 TABLET ORAL at 02:34

## 2021-01-01 RX ADMIN — PROPOFOL 50 MCG/KG/MIN: 10 INJECTION, EMULSION INTRAVENOUS at 08:25

## 2021-01-01 RX ADMIN — OXYCODONE HYDROCHLORIDE AND ACETAMINOPHEN 500 MG: 500 TABLET ORAL at 21:00

## 2021-01-01 RX ADMIN — PROPOFOL 50 MCG/KG/MIN: 10 INJECTION, EMULSION INTRAVENOUS at 06:58

## 2021-01-01 RX ADMIN — DEXMEDETOMIDINE HYDROCHLORIDE 1.5 MCG/KG/HR: 400 INJECTION INTRAVENOUS at 23:11

## 2021-01-01 RX ADMIN — Medication 300 MCG/HR: at 20:57

## 2021-01-01 RX ADMIN — DEXMEDETOMIDINE HYDROCHLORIDE 1.5 MCG/KG/HR: 400 INJECTION INTRAVENOUS at 17:01

## 2021-01-01 RX ADMIN — BENZONATATE 200 MG: 100 CAPSULE ORAL at 15:39

## 2021-01-01 RX ADMIN — DEXMEDETOMIDINE HYDROCHLORIDE 1 MCG/KG/HR: 400 INJECTION INTRAVENOUS at 06:20

## 2021-01-01 RX ADMIN — MIDAZOLAM 20 MG/HR: 5 INJECTION, SOLUTION INTRAMUSCULAR; INTRAVENOUS at 17:08

## 2021-01-01 RX ADMIN — Medication 20 ML: at 22:00

## 2021-01-01 RX ADMIN — Medication 10 ML: at 21:02

## 2021-01-01 RX ADMIN — HUMAN INSULIN 5 UNITS: 100 INJECTION, SUSPENSION SUBCUTANEOUS at 20:08

## 2021-01-01 RX ADMIN — PROPOFOL 50 MCG/KG/MIN: 10 INJECTION, EMULSION INTRAVENOUS at 20:07

## 2021-01-01 RX ADMIN — Medication 10 ML: at 06:29

## 2021-01-01 RX ADMIN — PROPOFOL 50 MCG/KG/MIN: 10 INJECTION, EMULSION INTRAVENOUS at 06:57

## 2021-01-01 RX ADMIN — DEXMEDETOMIDINE HYDROCHLORIDE 1.5 MCG/KG/HR: 400 INJECTION INTRAVENOUS at 11:52

## 2021-01-01 RX ADMIN — Medication 300 MCG/HR: at 04:50

## 2021-01-01 RX ADMIN — DEXTROMETHORPHAN 30 MG: 30 SUSPENSION, EXTENDED RELEASE ORAL at 00:43

## 2021-01-01 RX ADMIN — Medication 17.2 MG: at 09:09

## 2021-01-01 RX ADMIN — FUROSEMIDE 40 MG: 10 INJECTION, SOLUTION INTRAMUSCULAR; INTRAVENOUS at 13:14

## 2021-01-01 RX ADMIN — DEXMEDETOMIDINE HYDROCHLORIDE 1.5 MCG/KG/HR: 400 INJECTION INTRAVENOUS at 14:18

## 2021-01-01 RX ADMIN — FAMOTIDINE: 10 INJECTION, SOLUTION INTRAVENOUS at 18:09

## 2021-01-01 RX ADMIN — Medication 2000 UNITS: at 08:45

## 2021-01-01 RX ADMIN — Medication 10 ML: at 06:04

## 2021-01-01 RX ADMIN — Medication: at 17:58

## 2021-01-01 RX ADMIN — DOCUSATE SODIUM 100 MG: 50 LIQUID ORAL at 08:00

## 2021-01-01 RX ADMIN — METHYLPREDNISOLONE SODIUM SUCCINATE 40 MG: 40 INJECTION, POWDER, FOR SOLUTION INTRAMUSCULAR; INTRAVENOUS at 20:56

## 2021-01-01 RX ADMIN — DEXMEDETOMIDINE HYDROCHLORIDE 1.5 MCG/KG/HR: 400 INJECTION INTRAVENOUS at 09:51

## 2021-01-01 RX ADMIN — DEXMEDETOMIDINE HYDROCHLORIDE 1.5 MCG/KG/HR: 400 INJECTION INTRAVENOUS at 15:49

## 2021-01-01 RX ADMIN — GUAIFENESIN AND CODEINE PHOSPHATE 10 ML: 100; 10 SOLUTION ORAL at 12:07

## 2021-01-01 RX ADMIN — OXYCODONE 15 MG: 5 TABLET ORAL at 11:12

## 2021-01-01 RX ADMIN — HUMAN INSULIN 5 UNITS: 100 INJECTION, SUSPENSION SUBCUTANEOUS at 08:59

## 2021-01-01 RX ADMIN — BENZONATATE 200 MG: 100 CAPSULE ORAL at 09:58

## 2021-01-01 RX ADMIN — Medication: at 08:36

## 2021-01-01 RX ADMIN — DEXMEDETOMIDINE HYDROCHLORIDE 1.5 MCG/KG/HR: 400 INJECTION INTRAVENOUS at 09:16

## 2021-01-01 RX ADMIN — Medication 200 MCG/HR: at 05:06

## 2021-01-01 RX ADMIN — METHYLPREDNISOLONE SODIUM SUCCINATE 20 MG: 40 INJECTION, POWDER, FOR SOLUTION INTRAMUSCULAR; INTRAVENOUS at 22:00

## 2021-01-01 RX ADMIN — MIDAZOLAM 10 MG: 1 INJECTION INTRAMUSCULAR; INTRAVENOUS at 12:18

## 2021-01-01 RX ADMIN — METHYLPREDNISOLONE SODIUM SUCCINATE 40 MG: 40 INJECTION, POWDER, FOR SOLUTION INTRAMUSCULAR; INTRAVENOUS at 04:52

## 2021-01-01 RX ADMIN — DEXMEDETOMIDINE HYDROCHLORIDE 1.5 MCG/KG/HR: 400 INJECTION INTRAVENOUS at 14:45

## 2021-01-01 RX ADMIN — EPINEPHRINE 1 MG: 0.1 INJECTION INTRACARDIAC; INTRAVENOUS at 12:22

## 2021-01-01 RX ADMIN — METHYLPREDNISOLONE SODIUM SUCCINATE 20 MG: 40 INJECTION, POWDER, FOR SOLUTION INTRAMUSCULAR; INTRAVENOUS at 21:58

## 2021-01-01 RX ADMIN — OXYCODONE 15 MG: 5 TABLET ORAL at 18:34

## 2021-01-01 RX ADMIN — CEFEPIME HYDROCHLORIDE 2 G: 2 INJECTION, POWDER, FOR SOLUTION INTRAVENOUS at 14:12

## 2021-01-01 RX ADMIN — PROPOFOL 50 MCG/KG/MIN: 10 INJECTION, EMULSION INTRAVENOUS at 11:54

## 2021-01-01 RX ADMIN — PROPOFOL 50 MCG/KG/MIN: 10 INJECTION, EMULSION INTRAVENOUS at 00:00

## 2021-01-01 RX ADMIN — NOREPINEPHRINE BITARTRATE 5 MCG/MIN: 1 INJECTION, SOLUTION, CONCENTRATE INTRAVENOUS at 18:41

## 2021-01-01 RX ADMIN — DEXMEDETOMIDINE HYDROCHLORIDE 1.5 MCG/KG/HR: 400 INJECTION INTRAVENOUS at 10:50

## 2021-01-01 RX ADMIN — GUAIFENESIN 1200 MG: 600 TABLET, EXTENDED RELEASE ORAL at 10:05

## 2021-01-01 RX ADMIN — DEXMEDETOMIDINE HYDROCHLORIDE 1.5 MCG/KG/HR: 400 INJECTION INTRAVENOUS at 12:47

## 2021-01-01 RX ADMIN — Medication 10 ML: at 15:47

## 2021-01-01 RX ADMIN — MIDAZOLAM 20 MG/HR: 5 INJECTION, SOLUTION INTRAMUSCULAR; INTRAVENOUS at 09:53

## 2021-01-01 RX ADMIN — OXYCODONE 15 MG: 5 TABLET ORAL at 02:34

## 2021-01-01 RX ADMIN — MIDAZOLAM 16 MG/HR: 5 INJECTION, SOLUTION INTRAMUSCULAR; INTRAVENOUS at 18:43

## 2021-01-01 RX ADMIN — DEXMEDETOMIDINE HYDROCHLORIDE 1.5 MCG/KG/HR: 400 INJECTION INTRAVENOUS at 18:22

## 2021-01-01 RX ADMIN — Medication 10 ML: at 05:15

## 2021-01-01 RX ADMIN — MIDAZOLAM HYDROCHLORIDE 5 MG: 1 INJECTION, SOLUTION INTRAMUSCULAR; INTRAVENOUS at 19:00

## 2021-01-01 RX ADMIN — MIDAZOLAM 20 MG/HR: 5 INJECTION, SOLUTION INTRAMUSCULAR; INTRAVENOUS at 06:39

## 2021-01-01 RX ADMIN — Medication 17.2 MG: at 09:27

## 2021-01-01 RX ADMIN — ENOXAPARIN SODIUM 40 MG: 100 INJECTION SUBCUTANEOUS at 02:08

## 2021-01-01 RX ADMIN — Medication 10 ML: at 06:58

## 2021-01-01 RX ADMIN — DEXMEDETOMIDINE HYDROCHLORIDE 1.5 MCG/KG/HR: 400 INJECTION INTRAVENOUS at 19:11

## 2021-01-01 RX ADMIN — PROPOFOL 50 MCG/KG/MIN: 10 INJECTION, EMULSION INTRAVENOUS at 11:52

## 2021-01-01 RX ADMIN — DEXMEDETOMIDINE HYDROCHLORIDE 1.5 MCG/KG/HR: 400 INJECTION INTRAVENOUS at 12:57

## 2021-01-01 RX ADMIN — ENOXAPARIN SODIUM 40 MG: 100 INJECTION SUBCUTANEOUS at 15:44

## 2021-01-01 RX ADMIN — DEXMEDETOMIDINE HYDROCHLORIDE 1.5 MCG/KG/HR: 400 INJECTION INTRAVENOUS at 13:23

## 2021-01-01 RX ADMIN — HUMAN INSULIN 5 UNITS: 100 INJECTION, SUSPENSION SUBCUTANEOUS at 09:30

## 2021-01-01 RX ADMIN — PROPOFOL 50 MCG/KG/MIN: 10 INJECTION, EMULSION INTRAVENOUS at 15:00

## 2021-01-01 RX ADMIN — SODIUM CHLORIDE 1000 ML: 9 INJECTION, SOLUTION INTRAVENOUS at 08:11

## 2021-01-01 RX ADMIN — Medication: at 04:12

## 2021-01-01 RX ADMIN — DEXTROSE MONOHYDRATE 25 G: 25 INJECTION, SOLUTION INTRAVENOUS at 06:53

## 2021-01-01 RX ADMIN — BUMETANIDE 1 MG: 0.25 INJECTION, SOLUTION INTRAMUSCULAR; INTRAVENOUS at 20:40

## 2021-01-01 RX ADMIN — Medication 17.2 MG: at 08:35

## 2021-01-01 RX ADMIN — CHLORHEXIDINE GLUCONATE 15 ML: 0.12 RINSE ORAL at 08:01

## 2021-01-01 RX ADMIN — ENOXAPARIN SODIUM 40 MG: 100 INJECTION SUBCUTANEOUS at 03:14

## 2021-01-01 RX ADMIN — FAMOTIDINE 20 MG: 10 INJECTION, SOLUTION INTRAVENOUS at 08:42

## 2021-01-01 RX ADMIN — DEXMEDETOMIDINE HYDROCHLORIDE 1.5 MCG/KG/HR: 400 INJECTION INTRAVENOUS at 13:32

## 2021-01-01 RX ADMIN — CEFEPIME HYDROCHLORIDE 2 G: 2 INJECTION, POWDER, FOR SOLUTION INTRAVENOUS at 00:44

## 2021-01-01 RX ADMIN — ENOXAPARIN SODIUM 40 MG: 100 INJECTION SUBCUTANEOUS at 02:26

## 2021-08-12 NOTE — Clinical Note
[address]  [address] [facility] [id number]  [phone]    Work/School Note    Date: 8/12/2021     To Whom It May concern:    Jonathan Tong was evaulated by the following provider(s):  Attending Provider: Asmita Tinsley MD  Nurse Practitioner: Deyvi Benitez NP.   COVID19 virus is suspected. Per the CDC guidelines we recommend home isolation until the following conditions are all met:    1. At least 10 days have passed since symptoms first appeared and  2. At least 24 hours have passed since last fever without the use of fever-reducing medications and  3.  Symptoms (e.g., cough, shortness of breath) have improved    Sincerely,          Kvng Garcia NP

## 2021-08-12 NOTE — ED TRIAGE NOTES
Pt arrives ambulatory reporting increased SOB and cough after having been diagnosed with COVID-19 on Sunday. He states his symptoms started Friday initially and he is unvaccinated. He has been taking tylenol and motrin for a fever as high as 102.

## 2021-08-12 NOTE — ED PROVIDER NOTES
HPI   Jazlyn Gracia is a 51 y.o. male without any PMhx who presents ambulatory to [facility] ED with cc of cough. States COVID s/sx of cough/ congestion started on Friday this past week. Noted (+) COVID on Sunday. States worsening cough, SOB today. Has also had rhinorrhea, congestion, fevers and diarrhea as associative symptoms. Rotating Motrin/ Tylenol for fever, last dose was at 1600 today. Loss of taste today as well. Denies N/V, HA, rashes, or urinary concerns. PCP: None    There are no other complaints, changes or physical findings at this time. No past medical history on file. Past Surgical History:   Procedure Laterality Date    HX HEENT      ORAL SURGERY    HX ORTHOPAEDIC      left knee         No family history on file. Social History     Socioeconomic History    Marital status:      Spouse name: Not on file    Number of children: Not on file    Years of education: Not on file    Highest education level: Not on file   Occupational History    Not on file   Tobacco Use    Smoking status: Former Smoker     Packs/day: 1.00     Years: 10.00     Pack years: 10.00    Smokeless tobacco: Never Used   Substance and Sexual Activity    Alcohol use: Yes     Alcohol/week: 3.3 standard drinks     Types: 4 Cans of beer per week    Drug use: No    Sexual activity: Not on file   Other Topics Concern    Not on file   Social History Narrative    Not on file     Social Determinants of Health     Financial Resource Strain:     Difficulty of Paying Living Expenses:    Food Insecurity:     Worried About Running Out of Food in the Last Year:     [address] in the Last Year:    Transportation Needs:     Lack of Transportation (Medical):      Lack of Transportation (Non-Medical):    Physical Activity:     Days of Exercise per Week:     Minutes of Exercise per Session:    Stress:     Feeling of Stress :    Social Connections:     Frequency of Communication with Friends and Family:     Frequency of Social Gatherings with Friends and Family:     Attends Jewish Services:     Active Member of Clubs or Organizations:     Attends Club or Organization Meetings:     Marital Status:    Intimate Partner Violence:     Fear of Current or Ex-Partner:     Emotionally Abused:     Physically Abused:     Sexually Abused: ALLERGIES: Patient has no known allergies. Review of Systems   Constitutional: Positive for fever. Negative for activity change, appetite change and chills. HENT: Positive for congestion and rhinorrhea. Negative for sinus pressure, sneezing and sore throat. Eyes: Negative for pain, discharge and visual disturbance. Respiratory: Positive for cough and shortness of breath. Gastrointestinal: Positive for diarrhea. Negative for abdominal pain, nausea and vomiting. Genitourinary: Negative for dysuria, flank pain, frequency and urgency. Musculoskeletal: Negative for arthralgias, back pain, gait problem, joint swelling and neck pain. Skin: Negative for color change and rash. Neurological: Negative for dizziness, weakness, light-headedness and headaches. Psychiatric/Behavioral: Negative for agitation, behavioral problems and confusion. All other systems reviewed and are negative. Vitals:    08/12/21 0104 08/12/21 0232   BP: 116/72    Pulse: 61 94   Resp: 28    Temp: 97.3 °F (36.3 °C)    SpO2: 98% 96%            Physical Exam  Vitals and nursing note reviewed. Constitutional:       General: He is not in acute distress. Appearance: He is well-developed. HENT:      Head: Normocephalic and atraumatic. Right Ear: External ear normal.      Left Ear: External ear normal.   Eyes:      Conjunctiva/sclera: Conjunctivae normal.      Pupils: Pupils are equal, round, and reactive to light. Cardiovascular:      Rate and Rhythm: Normal rate and regular rhythm.    Pulmonary:      Effort: Pulmonary effort is normal.      Comments: Dry unproductive cough Musculoskeletal:         General: Normal range of motion. Cervical back: Normal range of motion and neck supple. Skin:     General: Skin is warm and dry. Neurological:      Mental Status: He is alert and oriented to person, place, and time. Psychiatric:         Behavior: Behavior normal.         Thought Content: Thought content normal.         Judgment: Judgment normal.          MDM  Number of Diagnoses or Management Options  COVID-19  Pneumonia due to COVID-19 virus  Diagnosis management comments: DDx: COVID, COVID PNA, URI     (+ )Patchy infiltrates w/ pulse ox 96-98% while in ED   Discussed viral nature of COVID w/ patient, need for isolation, and reasons to return to ED   Rx for Azithro/ Prednisone/ Inhaler/ Tessalon given        Amount and/or Complexity of Data Reviewed  Tests in the radiology section of CPT®: ordered and reviewed  Review and summarize past medical records: yes           Procedures    LABORATORY TESTS:  No results found for this or any previous visit (from the past 12 hour(s)). IMAGING RESULTS:  XR CHEST PORT   Final Result   Patchy lung base infiltrates. MEDICATIONS GIVEN:  Medications - No data to display    IMPRESSION:  1. COVID-19    2. Acute URI    3. Pneumonia due to COVID-19 virus        PLAN:  1. Discharge Medication List as of 8/12/2021  2:04 AM      START taking these medications    Details   predniSONE (DELTASONE) 50 mg tablet Take 1 Tablet by mouth daily for 3 days. , Print, Disp-3 Tablet, R-0      albuterol sulfate (PROAIR RESPICLICK) 90 mcg/actuation breath activated inhaler Take 2 Puffs by inhalation four (4) times daily as needed for Cough (SOB, wheezing). , Print, Disp-1 Inhaler, R-0         CONTINUE these medications which have CHANGED    Details   azithromycin (ZITHROMAX) 250 mg tablet Take 2 tablets today, then take 1 tablet daily, Print, Disp-6 Tablet, R-0         CONTINUE these medications which have NOT CHANGED    Details   EPINEPHrine (EPIPEN 2-SOLITARIO) 0.3 mg/0.3 mL (1:1,000) injection EpiPen is intended for patients who weigh 30 kg or more (approximately 66 pounds or more). 0.3 mL by IntraMUSCular route once as needed for 1 dose. Print, 0.3 mg, Disp-2 Each, R-0      diazepam (VALIUM) 5 mg tablet Take 1 Tab by mouth every eight (8) hours as needed (spasm). Print, 5 mg, Disp-15 Tab, R-0           2. Follow-up Information     Follow up With Specialties Details Why Contact Info    [address], [facility] DEP Emergency Medicine Go to  As needed, [facility] symptoms worsen [facility]  [phone]        3.  Return to ED if worse

## 2021-08-14 PROBLEM — U07.1 PNEUMONIA DUE TO COVID-19 VIRUS: Status: ACTIVE | Noted: 2021-01-01

## 2021-08-14 PROBLEM — R65.20 SEVERE SEPSIS (HCC): Status: ACTIVE | Noted: 2021-01-01

## 2021-08-14 PROBLEM — J12.82 PNEUMONIA DUE TO COVID-19 VIRUS: Status: ACTIVE | Noted: 2021-01-01

## 2021-08-14 PROBLEM — A41.9 SEVERE SEPSIS (HCC): Status: ACTIVE | Noted: 2021-01-01

## 2021-08-14 NOTE — ED NOTES
TRANSFER - OUT REPORT:    Verbal report given to Erna Mcdonnell (name) on Kizzy Lino  being transferred to  (unit) for routine progression of care       Report consisted of patients Situation, Background, Assessment and   Recommendations(SBAR). Information from the following report(s) SBAR and ED Summary was reviewed with the receiving nurse. Lines:   Peripheral IV 08/14/21 Left Antecubital (Active)        Opportunity for questions and clarification was provided.       Patient transported with:   O2 @ NRB mask 15 liters  Registered Nurse

## 2021-08-14 NOTE — ED NOTES
Spoke with MD Mendy Kelley regarding BP still being elevated post hydralazin. At this time since patient is not having chest pain or headache that BP is ok and does not want to order any coverage at this time for the /112. If anything changes will call MD back.

## 2021-08-14 NOTE — PROGRESS NOTES
ED notified of the need to an updated weight for Mr. Pat Jaramillo to dose Actemra at 12:20 pm.  Repeat request made at 13:40 pm.  The last weight in [address] [facility] is from 2018.

## 2021-08-14 NOTE — CONSULTS
Pulmonary, Critical Care, and Sleep Medicine    Initial Patient Consult    Name: Sukhdeep Metzger MRN: [id number]   : 1975 Hospital: [place] [address]   Date: 2021        IMPRESSION:   · Acute hypoxic respiratory failure  · COVID-19 pneumonia  · ARDS  · NABEEL      RECOMMENDATIONS:   · O2 - currently on 30 LPM  · Dexamethasone x 10 days  · Remdesivir x 5 days  · Tocilizumab today  · Monitor creatinine  · DVT prophylaxis  · High risk for further decompensation. High likelihood of requiring ICU admission. Subjective: This patient has been seen and evaluated at the request of Dr. Asmita Adams for \"Actemra\". Patient is a 51 y.o. unvaccinated male with no significant PMH who was diagnosed with COVID on 21. He has had progressively worsening dyspnea since that time and now is hypoxic, requiring HFNC to maintain SpO2 > 90%. He is currently on 30 LPM and remains very short of breath. Feels \"hot\". No past medical history on file. Past Surgical History:   Procedure Laterality Date    HX HEENT      ORAL SURGERY    HX ORTHOPAEDIC      left knee      Prior to Admission medications    Medication Sig Start Date End Date Taking? Authorizing Provider   azithromycin (ZITHROMAX) 250 mg tablet Take 2 tablets today, then take 1 tablet daily 21   Wonda Coma, NP   predniSONE (DELTASONE) 50 mg tablet Take 1 Tablet by mouth daily for 3 days. 8/12/21 8/15/21  Wonda Coma, NP   albuterol sulfate (PROAIR RESPICLICK) 90 mcg/actuation breath activated inhaler Take 2 Puffs by inhalation four (4) times daily as needed for Cough (SOB, wheezing). 21   Wonda Coma, NP   benzonatate (Tessalon Perles) 100 mg capsule Take 1 Capsule by mouth three (3) times daily as needed for Cough for up to 7 days. 21  Wonda Coma, NP   EPINEPHrine (EPIPEN 2-SOLITARIO) 0.3 mg/0.3 mL (1:1,000) injection 0.3 mL by IntraMUSCular route once as needed for 1 dose.  13   Umu Morrison MD diazepam (VALIUM) 5 mg tablet Take 1 Tab by mouth every eight (8) hours as needed (spasm). 12   LIBERTY Jeffries     No Known Allergies   Social History     Tobacco Use    Smoking status: Former Smoker     Packs/day: 1.00     Years: 10.00     Pack years: 10.00    Smokeless tobacco: Never Used   Substance Use Topics    Alcohol use: Yes     Alcohol/week: 3.3 standard drinks     Types: 4 Cans of beer per week      No family history on file. Current Facility-Administered Medications   Medication Dose Route Frequency    0.9% sodium chloride infusion 1,000 mL  1,000 mL IntraVENous CONTINUOUS    benzonatate (TESSALON) capsule 100 mg  100 mg Oral TID    sodium chloride (NS) flush 5-40 mL  5-40 mL IntraVENous Q8H    enoxaparin (LOVENOX) injection 30 mg  30 mg SubCUTAneous Q12H    0.9% sodium chloride infusion  150 mL/hr IntraVENous CONTINUOUS    [START ON 8/15/2021] cholecalciferol (VITAMIN D3) (1000 Units /25 mcg) tablet 2,000 Units  2,000 Units Oral DAILY    dexamethasone (DECADRON) 4 mg/mL injection 6 mg  6 mg IntraVENous Q24H    ascorbic acid (vitamin C) (VITAMIN C) tablet 500 mg  500 mg Oral BID    remdesivir 200 mg in 0.9% sodium chloride 250 mL IVPB  200 mg IntraVENous ONCE    Followed by   Mark Ortega ON 8/15/2021] remdesivir 100 mg in 0.9% sodium chloride 250 mL IVPB  100 mg IntraVENous Q24H    albuterol (PROVENTIL HFA, VENTOLIN HFA, PROAIR HFA) inhaler 2 Puff  2 Puff Inhalation Q4H RT       Review of Systems:  A comprehensive review of systems was negative except for that written in the HPI.     Objective:   Vital Signs:    Visit Vitals  BP (!) 168/100   Pulse 97   Temp 97 °F (36.1 °C)   Resp (!) 33   Ht 5' 10\" (1.778 m)   Wt 117.9 kg (260 lb)   SpO2 95%   BMI 37.31 kg/m²       O2 Device: Hi flow nasal cannula   O2 Flow Rate (L/min): 30 l/min   Temp (24hrs), Av °F (36.1 °C), Min:97 °F (36.1 °C), Max:97 °F (36.1 °C)       Intake/Output:   Last shift:      No intake/output data recorded. Last 3 shifts: No intake/output data recorded. No intake or output data in the 24 hours ending 08/14/21 1346   Physical Exam:   General:  Alert, ill appearing, increased WOB   Head:  Normocephalic, without obvious abnormality, atraumatic. Eyes:  Conjunctivae/corneas clear. Nose: Nares normal. Septum midline. Mucosa normal.    Throat: Lips, mucosa, and tongue normal.     Neck: Supple, symmetrical, trachea midline    Back:   Symmetric, no curvature. ROM normal.   Lungs: Tachypneic, distant bilateral breath sounds   Chest wall:  No tenderness or deformity. Heart:  Regular rate and rhythm    Abdomen:   Soft, non-tender. Bowel sounds normal.     Extremities: Extremities normal, atraumatic, no cyanosis or edema. Skin: Skin color, texture, turgor normal. No rashes or lesions   Neurologic: Grossly nonfocal     Data review:     Recent Results (from the past 24 hour(s))   COVID-19 RAPID TEST    Collection Time: 08/14/21  9:00 AM   Result Value Ref Range    Specimen source Nasopharyngeal      COVID-19 rapid test Detected (AA) NOTD     CBC WITH AUTOMATED DIFF    Collection Time: 08/14/21  9:09 AM   Result Value Ref Range    WBC 7.6 4.1 - 11.1 K/uL    RBC 5.27 4. 10 - 5.70 M/uL    HGB 15.5 12.1 - 17.0 g/dL    HCT 47.3 36.6 - 50.3 %    MCV 89.8 80.0 - 99.0 FL    MCH 29.4 26.0 - 34.0 PG    MCHC 32.8 30.0 - 36.5 g/dL    RDW 13.5 11.5 - 14.5 %    PLATELET 916 (L) 497 - 400 K/uL    NRBC 0.0 0  WBC    ABSOLUTE NRBC 0.00 0.00 - 0.01 K/uL    NEUTROPHILS 84 (H) 32 - 75 %    LYMPHOCYTES 10 (L) 12 - 49 %    MONOCYTES 5 5 - 13 %    EOSINOPHILS 0 0 - 7 %    BASOPHILS 0 0 - 1 %    IMMATURE GRANULOCYTES 1 (H) 0.0 - 0.5 %    ABS. NEUTROPHILS 6.3 1.8 - 8.0 K/UL    ABS. LYMPHOCYTES 0.8 0.8 - 3.5 K/UL    ABS. MONOCYTES 0.4 0.0 - 1.0 K/UL    ABS. EOSINOPHILS 0.0 0.0 - 0.4 K/UL    ABS. BASOPHILS 0.0 0.0 - 0.1 K/UL    ABS. IMM.  GRANS. 0.1 (H) 0.00 - 0.04 K/UL    DF SMEAR SCANNED      RBC COMMENTS NORMOCYTIC, NORMOCHROMIC TROPONIN I    Collection Time: 08/14/21  9:09 AM   Result Value Ref Range    Troponin-I, Qt. <0.05 <6.28 ng/mL   METABOLIC PANEL, COMPREHENSIVE    Collection Time: 08/14/21  9:09 AM   Result Value Ref Range    Sodium 131 (L) 136 - 145 mmol/L    Potassium 4.6 3.5 - 5.1 mmol/L    Chloride 102 97 - 108 mmol/L    CO2 23 21 - 32 mmol/L    Anion gap 6 5 - 15 mmol/L    Glucose 125 (H) 65 - 100 mg/dL    BUN 25 (H) 6 - 20 MG/DL    Creatinine 1.30 0.70 - 1.30 MG/DL    BUN/Creatinine ratio 19 12 - 20      GFR est AA >60 >60 ml/min/1.73m2    GFR est non-AA 59 (L) >60 ml/min/1.73m2    Calcium 8.6 8.5 - 10.1 MG/DL    Bilirubin, total 0.8 0.2 - 1.0 MG/DL    ALT (SGPT) 101 (H) 12 - 78 U/L    AST (SGOT) 265 (H) 15 - 37 U/L    Alk. phosphatase 69 45 - 117 U/L    Protein, total 7.8 6.4 - 8.2 g/dL    Albumin 4.0 3.5 - 5.0 g/dL    Globulin 3.8 2.0 - 4.0 g/dL    A-G Ratio 1.1 1.1 - 2.2     LD    Collection Time: 08/14/21  9:09 AM   Result Value Ref Range    LD 1,386 (H) 85 - 241 U/L   SAMPLES BEING HELD    Collection Time: 08/14/21  9:13 AM   Result Value Ref Range    SAMPLES BEING HELD 1RED,1BLU(BC),1LAV(BC)     COMMENT        Add-on orders for these samples will be processed based on acceptable specimen integrity and analyte stability, which may vary by analyte.    LACTIC ACID    Collection Time: 08/14/21  9:13 AM   Result Value Ref Range    Lactic acid 3.1 (HH) 0.4 - 2.0 MMOL/L   C REACTIVE PROTEIN, QT    Collection Time: 08/14/21  9:13 AM   Result Value Ref Range    C-Reactive protein 10.30 (H) 0.00 - 0.60 mg/dL   POC EG7    Collection Time: 08/14/21 10:04 AM   Result Value Ref Range    Calcium, ionized (POC) 1.14 1.12 - 1.32 mmol/L    pH (POC) 7.40 7.35 - 7.45      pCO2 (POC) 37.3 35.0 - 45.0 MMHG    pO2 (POC) 54 (L) 80 - 100 MMHG    HCO3 (POC) 23.2 22 - 26 MMOL/L    Base deficit (POC) 1.1 mmol/L    sO2 (POC) 87.8 (L) 92 - 97 %    Site LEFT RADIAL      Device: NASAL CANNULA      Allens test (POC) Positive      Specimen type (POC) ARTERIAL     EKG, 12 LEAD, INITIAL    Collection Time: 08/14/21 11:37 AM   Result Value Ref Range    Ventricular Rate 104 BPM    Atrial Rate 104 BPM    P-R Interval 154 ms    QRS Duration 82 ms    Q-T Interval 316 ms    QTC Calculation (Bezet) 415 ms    Calculated P Axis 21 degrees    Calculated R Axis -33 degrees    Calculated T Axis 28 degrees    Diagnosis       Sinus tachycardia  Left axis deviation  Abnormal ECG  When compared with ECG of 29-AUG-2013 11:55,  Vent. rate has increased BY  39 BPM  Questionable change in QRS duration     D DIMER    Collection Time: 08/14/21  1:04 PM   Result Value Ref Range    D-dimer 0.36 0.00 - 0.65 mg/L FEU       Imaging:  I have personally reviewed the patients radiographs and have reviewed the reports:  Diffuse bilateral infiltrates.           Elise Marsh MD

## 2021-08-14 NOTE — PROGRESS NOTES
Lovenox Monitoring  Indication: Low intensity anticoagulation for COVID-19  Recent Labs     08/14/21  0909   HGB 15.5   *   CREA 1.30     Current Weight: 117.9 kg  Est. CrCl = 90 ml/min  Current Dose: 30 mg subcutaneously every 12 hours. Plan: Change to 40 mg every 12 hours.     (CLOSE I-vent after review and verification)

## 2021-08-14 NOTE — ED TRIAGE NOTES
Triage: Pt arrives ambulatory from home with CC of shortness of breath. He is covid positive. He is not covid vaccinations. O2 saturations on room air 86%.

## 2021-08-14 NOTE — ED PROVIDER NOTES
HPI     Patient is a 25-year-old male who presents today for difficulty breathing. Patient was seen in the ED several days ago due to difficulty breathing and was Covid positive. Patient said that since then, his symptoms have gotten worse. Alleviating factors including taking deep breaths. Associated symptoms includes pleuritic chest pain and cough. No past medical history on file. Past Surgical History:   Procedure Laterality Date    HX HEENT      ORAL SURGERY    HX ORTHOPAEDIC      left knee         No family history on file. Social History     Socioeconomic History    Marital status:      Spouse name: Not on file    Number of children: Not on file    Years of education: Not on file    Highest education level: Not on file   Occupational History    Not on file   Tobacco Use    Smoking status: Former Smoker     Packs/day: 1.00     Years: 10.00     Pack years: 10.00    Smokeless tobacco: Never Used   Substance and Sexual Activity    Alcohol use: Yes     Alcohol/week: 3.3 standard drinks     Types: 4 Cans of beer per week    Drug use: No    Sexual activity: Not on file   Other Topics Concern    Not on file   Social History Narrative    Not on file     Social Determinants of Health     Financial Resource Strain:     Difficulty of Paying Living Expenses:    Food Insecurity:     Worried About Running Out of Food in the Last Year:     [address] in the Last Year:    Transportation Needs:     Lack of Transportation (Medical):      Lack of Transportation (Non-Medical):    Physical Activity:     Days of Exercise per Week:     Minutes of Exercise per Session:    Stress:     Feeling of Stress :    Social Connections:     Frequency of Communication with Friends and Family:     Frequency of Social Gatherings with Friends and Family:     Attends [employer] Services:     Active Member of Clubs or Organizations:     Attends Club or Organization Meetings:     Marital Status: Intimate Partner Violence:     Fear of Current or Ex-Partner:     Emotionally Abused:     Physically Abused:     Sexually Abused: ALLERGIES: Patient has no known allergies. Review of Systems   Constitutional: Positive for diaphoresis and fever. Negative for appetite change. HENT: Negative for congestion and rhinorrhea. Eyes: Negative for discharge and redness. Respiratory: Positive for cough and shortness of breath. Cardiovascular: Positive for chest pain. Gastrointestinal: Negative for abdominal pain, diarrhea, nausea and vomiting. Genitourinary: Negative for decreased urine volume and dysuria. Musculoskeletal: Negative for back pain. Skin: Negative for rash and wound. Neurological: Negative for seizures and headaches. Hematological: Does not bruise/bleed easily. Psychiatric/Behavioral: Negative for agitation. All other systems reviewed and are negative. Vitals:    08/14/21 1400 08/14/21 1415 08/14/21 1500 08/14/21 1657   BP: (!) 178/101  (!) 179/104    Pulse: (!) 101 98 98    Resp: (!) 40 (!) 35 29    Temp:  100.4 °F (38 °C)     SpO2: 92% 92% 95% 92%   Weight:       Height:                Physical Exam  Vitals and nursing note reviewed. Constitutional:       General: He is not in acute distress. Appearance: He is well-developed. He is ill-appearing and diaphoretic. HENT:      Head: Normocephalic and atraumatic. Right Ear: External ear normal.      Left Ear: External ear normal.      Nose: Nose normal.   Eyes:      General:         Right eye: No discharge. Left eye: No discharge. Extraocular Movements: Extraocular movements intact. Conjunctiva/sclera: Conjunctivae normal.      Pupils: Pupils are equal, round, and reactive to light. Cardiovascular:      Rate and Rhythm: Normal rate and regular rhythm. Pulses: Normal pulses. Heart sounds: Normal heart sounds. No murmur heard. No friction rub. No gallop.     Pulmonary: Effort: Respiratory distress present. Breath sounds: No stridor. Rhonchi present. No wheezing or rales. Chest:      Chest wall: No tenderness. Abdominal:      General: Bowel sounds are normal. There is no distension. Palpations: Abdomen is soft. There is no mass. Tenderness: There is no abdominal tenderness. There is no guarding or rebound. Musculoskeletal:         General: No deformity. Normal range of motion. Cervical back: Normal range of motion and neck supple. Skin:     General: Skin is warm. Capillary Refill: Capillary refill takes less than 2 seconds. Findings: No rash. Neurological:      General: No focal deficit present. Mental Status: He is alert and oriented to person, place, and time. Coordination: Coordination normal.   Psychiatric:         Behavior: Behavior normal.          [facility]        MEDICAL DECISION MAKIN y.o. male presents with Positive For Covid-19    Differential diagnosis includes but not limited to: COVID-19 pneumonia versus pneumothorax versus acute respiratory failure versus pulmonary embolism. Patient is a 79-year-old male who presents today with COVID-19. He has had symptoms for several days, progressively getting worse. Arrives tachypneic and hypoxic. Placed initially on 2 L, increased to 5 L. ABG ordered, placed on high flow nasal cannula 20 L/minute. EKG reviewed. Labs reviewed. CTA negative for PE but does show Covid19 pneumonia. Patient is appropriate for admission to stepdown. Sami Cespedes was evaluated in the Emergency Department on 2021 for the symptoms described in the history of present illness. He/she was evaluated in the context of the global COVID-19 pandemic, which necessitated consideration that the patient might be at risk for infection with the SARS-CoV-2 virus that causes COVID-19.  Institutional protocols and algorithms that pertain to the evaluation of patients at risk for COVID-19 are in a state of rapid change based on information released by regulatory bodies including the CDC and federal and state organizations. These policies and algorithms were followed during the patient's care in the ED. Surrogate Decision Maker (Who do you want to make decisions for you in the event you are not able to?): Extended Emergency Contact Information  Primary Emergency Contact: [address] Phone: [phone]  Relation: Other Relative  Secondary Emergency Contact: Lalitha Bynum  Address: [address] [place] [address] [address] [place] [address], [address] [place] [address] [address] [facility] Phone: [phone]  Relation: Girlfriend    Ventilation (Do you want to be intubated and mechanically ventilated?):  Yes    CPR (Do you want chest compressions and electricity in an attempt to restart your heart?): Yes      Perfect Serve Consult for Admission  11:31 AM    ED Room Number: ER08/08  Patient Name and age:  Sami Parents 51 y.o.  male  Working Diagnosis:   1. Acute respiratory failure due to COVID-19 ([address]    2. Hypoxia    3. Pneumonia due to COVID-19 virus        COVID-19 Suspicion:  yes  Sepsis present:  no  Reassessment needed: no  Code Status:  Full Code  Readmission: no  Isolation Requirements:  yes  Recommended Level of Care:  step down  Department:[facility] Adult ED - 21   Other: Patient is a 43-year-old male who presents today with Covid and difficulty breathing. CTA chest negative for PE but does show Covid pneumonia. Currently on high flow nasal cannula 20 L/min. LABORATORY TESTS:  Labs Reviewed   COVID-19 RAPID TEST - Abnormal; Notable for the following components:       Result Value    COVID-19 rapid test Detected (*)     All other components within normal limits   CBC WITH AUTOMATED DIFF - Abnormal; Notable for the following components:    PLATELET 052 (*)     NEUTROPHILS 84 (*)     LYMPHOCYTES 10 (*)     IMMATURE GRANULOCYTES 1 (*)     ABS. IMM.  GRANS. 0.1 (*)     All other components within normal limits   METABOLIC PANEL, COMPREHENSIVE - Abnormal; Notable for the following components:    Sodium 131 (*)     Glucose 125 (*)     BUN 25 (*)     GFR est non-AA 59 (*)     ALT (SGPT) 101 (*)     AST (SGOT) 265 (*)     All other components within normal limits   LACTIC ACID - Abnormal; Notable for the following components:    Lactic acid 3.1 (*)     All other components within normal limits   C REACTIVE PROTEIN, QT - Abnormal; Notable for the following components:    C-Reactive protein 10.30 (*)     All other components within normal limits   POC EG7 - Abnormal; Notable for the following components:    pO2 (POC) 54 (*)     sO2 (POC) 87.8 (*)     All other components within normal limits   LD - Abnormal; Notable for the following components:    LD 1,386 (*)     All other components within normal limits   CULTURE, BLOOD   CULTURE, BLOOD   TROPONIN I   SAMPLES BEING HELD   D DIMER   LACTIC ACID   MAGNESIUM   BLOOD GAS, ARTERIAL   PROCALCITONIN       IMAGING RESULTS:  CTA CHEST W OR W WO CONT   Final Result   1. No evidence of pulmonary embolus. 2.  Moderate to severe COVID 19 pneumonia.                 MEDICATIONS GIVEN:  Medications   0.9% sodium chloride infusion 1,000 mL (0 mL IntraVENous IV Completed 8/14/21 1400)   benzonatate (TESSALON) capsule 100 mg (100 mg Oral Given 8/14/21 1537)   sodium chloride (NS) flush 5-40 mL (5 mL IntraVENous Given 8/14/21 1426)   sodium chloride (NS) flush 5-40 mL (10 mL IntraVENous Given 8/14/21 1542)   acetaminophen (TYLENOL) tablet 650 mg (650 mg Oral Given 8/14/21 1537)     Or   acetaminophen (TYLENOL) suppository 650 mg ( Rectal See Alternative 8/14/21 1537)   polyethylene glycol (MIRALAX) packet 17 g (has no administration in time range)   ondansetron (ZOFRAN ODT) tablet 4 mg (has no administration in time range)     Or   ondansetron (ZOFRAN) injection 4 mg (has no administration in time range)   0.9% sodium chloride infusion (150 mL/hr IntraVENous New Bag 8/14/21 1346)   cholecalciferol (VITAMIN D3) (1000 Units /25 mcg) tablet 2,000 Units (has no administration in time range)   dexamethasone (DECADRON) 4 mg/mL injection 6 mg (6 mg IntraVENous Given 8/14/21 1356)   ascorbic acid (vitamin C) (VITAMIN C) tablet 500 mg (500 mg Oral Given 8/14/21 1357)   remdesivir 200 mg in 0.9% sodium chloride 250 mL IVPB (0 mg IntraVENous IV Completed 8/14/21 1500)     Followed by   remdesivir 100 mg in 0.9% sodium chloride 250 mL IVPB (has no administration in time range)   albuterol (PROVENTIL HFA, VENTOLIN HFA, PROAIR HFA) inhaler 2 Puff (2 Puffs Inhalation Given 8/14/21 1656)   enoxaparin (LOVENOX) injection 40 mg (40 mg SubCUTAneous Given 8/14/21 1537)   hydrALAZINE (APRESOLINE) 20 mg/mL injection 10 mg (10 mg IntraVENous Given 8/14/21 1538)   iopamidoL (ISOVUE-370) 76 % injection 100 mL (80 mL IntraVENous Given 8/14/21 1118)   tocilizumab (ACTEMRA ACTPEN) 810 mg in 0.9% sodium chloride 100 mL infusion (810 mg IntraVENous New Bag 8/14/21 1538)       PROGRESS NOTE:   Patient's symptoms have improved         EKG:  Rhythm: sinus tachycardia; Rate (approx.): 104; Axis: left axis deviation; QRS interval: 82; ST/T wave: non-specific changes; Other findings: abnormal ekg. CONSULTS:  Hospitalist    IMPRESSION:  1. Acute respiratory failure due to COVID-19 ([facility] [address]    2. Hypoxia    3.  Pneumonia due to COVID-19 virus        PLAN:  - Admit to hospitalist    Total critical care time spent exclusive of procedures:  45 minutes    Junaid Goss MD            Procedures

## 2021-08-14 NOTE — H&P
History & Physical    Date of admission: 8/14/2021    Patient name: Jeanne Sanchez  MRN: [id number]  YOB: 1975  Age: 51 y.o. Primary care provider:  None     Source of Information: patient, medical records                                 Chief complain:  Worsening SOB     History of present illness  Jeanne Sanchez is a 51 y.o. male who presents with NO PMHx presents to hospital with SOB that has been worsening over past 2 days. Patient states that his whole family has COVID, starting with his daughter. Patient states that he started to have body aches, fever, since last Sunday on 8/8. Pt was seen in ER on 8/12 at which time he had pneumonia but presume he was not hypoxic and sent home on Prednisone and Azithromycin. Patient states after going home he has progressively worsened. Patient now having worsening cough which is dry and SOB. Patient states he has been having fever of 102 at home. Patient is NOT vaccinated against COVID-19. Patient states he and his son were planning to get COVID Vaccine soon. No n/v diarrhea, abdominal pain,dysuria, weakness, numbness. No past medical history on file. Past Surgical History:   Procedure Laterality Date    HX HEENT      ORAL SURGERY    HX ORTHOPAEDIC      left knee     Prior to Admission medications    Medication Sig Start Date End Date Taking? Authorizing Provider   azithromycin (ZITHROMAX) 250 mg tablet Take 2 tablets today, then take 1 tablet daily 8/12/21   Bentley Heimlich, NP   predniSONE (DELTASONE) 50 mg tablet Take 1 Tablet by mouth daily for 3 days. 8/12/21 8/15/21  Bentley Heimlich, NP   albuterol sulfate (PROAIR RESPICLICK) 90 mcg/actuation breath activated inhaler Take 2 Puffs by inhalation four (4) times daily as needed for Cough (SOB, wheezing).  8/12/21   Bentley Heimlich, NP   benzonatate (Tessalon Perles) 100 mg capsule Take 1 Capsule by mouth three (3) times daily as needed for Cough for up to 7 days. 8/12/21 8/19/21  Yovany Moran NP   EPINEPHrine (EPIPEN 2-SOLITARIO) 0.3 mg/0.3 mL (1:1,000) injection 0.3 mL by IntraMUSCular route once as needed for 1 dose. 8/29/13   Dominga Rivera MD   diazepam (VALIUM) 5 mg tablet Take 1 Tab by mouth every eight (8) hours as needed (spasm). 11/13/12   LIBERTY Jeffries     No Known Allergies   No family history on file. Family history reviewed and non-contributory. Social history  Patient resides  X  Independently      With family care      Assisted living      SNF    Ambulates  X  Independently      With cane       Assisted walker         Alcohol history   X  None     Social     Chronic   Smoking history    None     Former smoker   X  Current smoker     Social History     Tobacco Use   Smoking Status Former Smoker    Packs/day: 1.00    Years: 10.00    Pack years: 10.00   Smokeless Tobacco Never Used       Code status  X  Full code     DNR/DNI        Code status discussed with the patient/caregivers. Full Code    Review of systems  The patient denies any fever, chills, chest pain, cough, congestion, recent illness, palpitations, or dysuria. A comprehensive review of systems was negative except for that written in the History of Present Illness. The remainder of the review of systems was reviewed and is noncontributory. Physical Examination   Visit Vitals  BP (!) 147/86   Pulse (!) 105   Temp 97 °F (36.1 °C)   Resp (!) 38   SpO2 95%      O2 Flow Rate (L/min): 30 l/min   O2 Device: Hi flow nasal cannula, Heated    General:  Alert, cooperative, moderate respiratory distress   Head:  Normocephalic, without obvious abnormality, atraumatic   Eyes:  Conjunctivae/corneas clear.  PERRL, EOMs intact   E/N/M/T:  Teeth and gums normal  Clear  oropharynx   Neck: No carotid bruit   Normal JVP   Lungs:   Diffuse wheezing and bilateral rales, tachypnea    Chest wall:  No tenderness or deformity   Heart:  Regular rhythm Sounds normal; tachycardia    Abdomen:   Soft, no tenderness  Bowel sounds normal     Back: No CVA tenderness   Extremities: Extremities normal, atraumatic  No cyanosis or edema  No DVT signs   Pulses 2+ and symmetric all extremities   Skin: No rashes or ulcers   Musculo-      skeletal: Gait not tested  Normal symmetry, ROM, strength and tone   Neuro: Normal cranial nerves  Normal reflexes and sensation   Psych: Alert, oriented x3  Normal affect, judgement and insight   Geniturinary: deferred     Data Review    24 Hour Results:  Recent Results (from the past 24 hour(s))   COVID-19 RAPID TEST    Collection Time: 08/14/21  9:00 AM   Result Value Ref Range    Specimen source Nasopharyngeal      COVID-19 rapid test Detected (AA) NOTD     CBC WITH AUTOMATED DIFF    Collection Time: 08/14/21  9:09 AM   Result Value Ref Range    WBC 7.6 4.1 - 11.1 K/uL    RBC 5.27 4. 10 - 5.70 M/uL    HGB 15.5 12.1 - 17.0 g/dL    HCT 47.3 36.6 - 50.3 %    MCV 89.8 80.0 - 99.0 FL    MCH 29.4 26.0 - 34.0 PG    MCHC 32.8 30.0 - 36.5 g/dL    RDW 13.5 11.5 - 14.5 %    PLATELET 760 (L) 997 - 400 K/uL    NRBC 0.0 0  WBC    ABSOLUTE NRBC 0.00 0.00 - 0.01 K/uL    NEUTROPHILS 84 (H) 32 - 75 %    LYMPHOCYTES 10 (L) 12 - 49 %    MONOCYTES 5 5 - 13 %    EOSINOPHILS 0 0 - 7 %    BASOPHILS 0 0 - 1 %    IMMATURE GRANULOCYTES 1 (H) 0.0 - 0.5 %    ABS. NEUTROPHILS 6.3 1.8 - 8.0 K/UL    ABS. LYMPHOCYTES 0.8 0.8 - 3.5 K/UL    ABS. MONOCYTES 0.4 0.0 - 1.0 K/UL    ABS. EOSINOPHILS 0.0 0.0 - 0.4 K/UL    ABS. BASOPHILS 0.0 0.0 - 0.1 K/UL    ABS. IMM.  GRANS. 0.1 (H) 0.00 - 0.04 K/UL    DF SMEAR SCANNED      RBC COMMENTS NORMOCYTIC, NORMOCHROMIC     TROPONIN I    Collection Time: 08/14/21  9:09 AM   Result Value Ref Range    Troponin-I, Qt. <0.05 <3.80 ng/mL   METABOLIC PANEL, COMPREHENSIVE    Collection Time: 08/14/21  9:09 AM   Result Value Ref Range    Sodium 131 (L) 136 - 145 mmol/L    Potassium 4.6 3.5 - 5.1 mmol/L    Chloride 102 97 - 108 mmol/L CO2 23 21 - 32 mmol/L    Anion gap 6 5 - 15 mmol/L    Glucose 125 (H) 65 - 100 mg/dL    BUN 25 (H) 6 - 20 MG/DL    Creatinine 1.30 0.70 - 1.30 MG/DL    BUN/Creatinine ratio 19 12 - 20      GFR est AA >60 >60 ml/min/1.73m2    GFR est non-AA 59 (L) >60 ml/min/1.73m2    Calcium 8.6 8.5 - 10.1 MG/DL    Bilirubin, total 0.8 0.2 - 1.0 MG/DL    ALT (SGPT) 101 (H) 12 - 78 U/L    AST (SGOT) 265 (H) 15 - 37 U/L    Alk. phosphatase 69 45 - 117 U/L    Protein, total 7.8 6.4 - 8.2 g/dL    Albumin 4.0 3.5 - 5.0 g/dL    Globulin 3.8 2.0 - 4.0 g/dL    A-G Ratio 1.1 1.1 - 2.2     SAMPLES BEING HELD    Collection Time: 08/14/21  9:13 AM   Result Value Ref Range    SAMPLES BEING HELD 1RED,1BLU(BC),1LAV(BC)     COMMENT        Add-on orders for these samples will be processed based on acceptable specimen integrity and analyte stability, which may vary by analyte.    LACTIC ACID    Collection Time: 08/14/21  9:13 AM   Result Value Ref Range    Lactic acid 3.1 (HH) 0.4 - 2.0 MMOL/L   C REACTIVE PROTEIN, QT    Collection Time: 08/14/21  9:13 AM   Result Value Ref Range    C-Reactive protein 10.30 (H) 0.00 - 0.60 mg/dL   POC EG7    Collection Time: 08/14/21 10:04 AM   Result Value Ref Range    Calcium, ionized (POC) 1.14 1.12 - 1.32 mmol/L    pH (POC) 7.40 7.35 - 7.45      pCO2 (POC) 37.3 35.0 - 45.0 MMHG    pO2 (POC) 54 (L) 80 - 100 MMHG    HCO3 (POC) 23.2 22 - 26 MMOL/L    Base deficit (POC) 1.1 mmol/L    sO2 (POC) 87.8 (L) 92 - 97 %    Site LEFT RADIAL      Device: NASAL CANNULA      Allens test (POC) Positive      Specimen type (POC) ARTERIAL     EKG, 12 LEAD, INITIAL    Collection Time: 08/14/21 11:37 AM   Result Value Ref Range    Ventricular Rate 104 BPM    Atrial Rate 104 BPM    P-R Interval 154 ms    QRS Duration 82 ms    Q-T Interval 316 ms    QTC Calculation (Bezet) 415 ms    Calculated P Axis 21 degrees    Calculated R Axis -33 degrees    Calculated T Axis 28 degrees    Diagnosis       Sinus tachycardia  Left axis deviation  Abnormal ECG  When compared with ECG of 29-AUG-2013 11:55,  Vent. rate has increased BY  39 BPM  Questionable change in QRS duration       Recent Labs     08/14/21  0909   WBC 7.6   HGB 15.5   HCT 47.3   *     Recent Labs     08/14/21  0909   *   K 4.6      CO2 23   *   BUN 25*   CREA 1.30   CA 8.6   ALB 4.0   TBILI 0.8   *       Imaging  CTA chest  There is extensive multifocal groundglass opacity with superimposed  consolidation throughout all lobes of both lungs. Assessment and Plan   Active Problems:    Severe sepsis ([facility] [address] (8/14/2021)      Pneumonia due to COVID-19 virus (8/14/2021)      Acute respiratory failure with hypoxia  Severe Sepsis  Covid Pneumonia  - Admit to IMCU, low threshold for ICU upgrade if respiratory status declines  - on HighFlow 30L, sating 94-95% but RR in 30s   - Decadron 6mg for 10 days  - D/w Pharmacy to add Remdesivir  - Pulm consulted , d/w  and agrees patient is appropriate for Actemra. Called Pharmacy and spoke with Alessandra Trinh who will place order for Actemra  - BIPAP at night  - Vit C, Vit D 3  - Lovenox   - daily CMP, CRP, D-dimer, LDH  - Prone daily   - Tessalon pearls for cough  - IVF, follow lactate  - Azithromycin for 5 days   - FULL CODE  - Discussed high risk for respiratory decline and intubation, He understand. Acute Kidney Insufficiency   - suspect for poor po intake  - on IVF    Diet: Regular  Activity: as tolerated   DVT prophylaxis: lovenox   Isolation precautions: droplet   Consultations: Pulm  Anticipated disposition: suspect needs 5-7 days or more hospitalization. Critical care time : 45 minutes, > 50% of time spent in direct patient care and coordination of care.           Signed by: Mckenzie Russ MD    August 14, 2021 at 12:22 PM

## 2021-08-15 NOTE — PROGRESS NOTES
[address] [facility]  Hospitalist Group                                                                                          Hospitalist Progress Note  Donald Mulligan MD  Answering service: [phone] OR [phone] from in house phone        Date of Service:  8/15/2021  NAME:  Maya Sauer  :  1975  MRN:  [id number]      Admission Summary:   Maya Sauer is a 51 y.o. male who presents with NO PMHx presents to hospital with SOB that has been worsening over past 2 days. Patient states that his whole family has COVID, starting with his daughter. Patient states that he started to have body aches, fever, since last  on . Pt was seen in ER on  at which time he had pneumonia but presume he was not hypoxic and sent home on Prednisone and Azithromycin. Patient states after going home he has progressively worsened. Patient now having worsening cough which is dry and SOB. Patient states he has been having fever of 102 at home. Patient is NOT vaccinated against COVID-19. Patient states he and his son were planning to get COVID Vaccine soon. No n/v diarrhea, abdominal pain,dysuria, weakness, numbness.     Interval history / Subjective:   Patient on BiPAP this am- awake but lethargic     Assessment & Plan:     Acute hypoxemic respiratory failure due to covid19 pneumonia  - large increase in O2 req overnight- from 3L to 40L and now BiPAP  - severe airspace dz on Chest CT  - cont IV Decadron ,  Remdesivir, Actemera given  - Pulm consulted and following- Vit C, Zinc  - Lovenox and monitor D Dimers  Prone daily   Check procalcitonin-  Azithromycin for 5 days    high risk for respiratory decline and intubation, Called wife lynn with an update today      Acute Kidney Insufficiency   Resolved with IVF, DC fluids    Elevated LFTs- will need to monitor while on remdesivir    Code status: Full  DVT prophylaxis: lovenox    Care Plan discussed with: Patient/Family  Anticipated Disposition: Home w/Family  Anticipated Discharge: Greater than 48 hours     Hospital Problems  Never Reviewed        Codes Class Noted POA    Severe sepsis ([address] ICD-10-CM: A41.9, R65.20  ICD-9-CM: 038.9, 995.92  8/14/2021 Unknown        Pneumonia due to COVID-19 virus ICD-10-CM: U07.1, J12.82  ICD-9-CM: 480.8, 079.89  8/14/2021 Unknown                Review of Systems:   A comprehensive review of systems was negative except for that written in the HPI. Vital Signs:    Last 24hrs VS reviewed since prior progress note. Most recent are:  Visit Vitals  BP (!) 155/108   Pulse 83   Temp 99.4 °F (37.4 °C)   Resp (!) 34   Ht 5' 10\" (1.778 m)   Wt 117.9 kg (260 lb)   SpO2 93%   BMI 37.31 kg/m²     Patient Vitals for the past 12 hrs:   Temp Pulse Resp BP SpO2   08/15/21 1018  79      08/15/21 0602  83      08/15/21 0412     93 %   08/15/21 0409  88      08/15/21 0357     91 %   08/15/21 0318  88 (!) 34 (!) 155/108 (!) 89 %   08/15/21 0158  (!) 101      08/15/21 0030 99.4 °F (37.4 °C) 94 25 (!) 170/108 91 %   08/15/21 0000  88      08/14/21 2246  90 28  96 %       Intake/Output Summary (Last 24 hours) at 8/15/2021 1002  Last data filed at 8/14/2021 1500  Gross per 24 hour   Intake 1250 ml   Output    Net 1250 ml        Physical Examination:     I had a face to face encounter with this patient and independently examined them on 8/15/2021 as outlined below:          Constitutional:  tired and ill appearing   ENT:  on BiPAP. Resp:  decreased breath sounds bilaterally. No wheezing   CV:  Regular rhythm, normal rate, no murmurs,     GI:  Soft, non distended, non tender. Musculoskeletal:  No edema, warm, 2+ pulses throughout    Neurologic:  Moves all extremities.   AAOx3, CN II-XII reviewed            Data Review:    Review and/or order of clinical lab test  Review and/or order of tests in the radiology section of CPT  Review and/or order of tests in the medicine section of CPT  CT Results  (Last 48 hours)               08/14/21 1118  CTA CHEST W OR W WO CONT Final result    Impression:  1. No evidence of pulmonary embolus. 2.  Moderate to severe COVID 19 pneumonia. Narrative:  INDICATION:   hypoxia        COMPARISON:  None       TECHNIQUE:  Following the uneventful intravenous administration of 100 cc Isovue   454, thin helical axial images were obtained through the chest. Postprocessing   was performed. 3D image postprocessing was performed. CT dose reduction was achieved through the use of a standardized protocol   tailored for this examination and automatic exposure control for dose   modulation. FINDINGS:       There are no enlarged mediastinal or hilar lymph nodes. Heart is enlarged. There is no pericardial effusion. No filling defect is seen within the pulmonary arterial system to suggest   pulmonary embolus. Pulmonary arteries are enlarged, compatible with pulmonary   arterial hypertension. The aorta is normal in caliber. There is extensive multifocal groundglass opacity with superimposed   consolidation throughout all lobes of both lungs. No pulmonary nodule or mass   is seen. There are no pleural effusions. Limited evaluation of the upper abdomen demonstrates no abnormality. The osseous   structures are unremarkable. Labs:     Recent Labs     08/15/21  0903 08/14/21  0909   WBC 3.2* 7.6   HGB 15.4 15.5   HCT 47.3 47.3   * 132*     Recent Labs     08/15/21  0903 08/14/21  0913 08/14/21  0909   *  --  131*   K 4.4  --  4.6     --  102   CO2 27  --  23   BUN 23*  --  25*   CREA 1.10  --  1.30   *  --  125*   CA 8.1*  --  8.6   MG  --  2.4  --      Recent Labs     08/15/21  0903 08/14/21  0909   * 101*   AP 76 69   TBILI 0.7 0.8   TP 7.0 7.8   ALB 3.4* 4.0   GLOB 3.6 3.8     No results for input(s): INR, PTP, APTT, INREXT in the last 72 hours.    No results for input(s): FE, TIBC, PSAT, FERR in the last 72 hours. No results found for: FOL, RBCF   No results for input(s): PH, PCO2, PO2 in the last 72 hours.   Recent Labs     08/14/21  0909   TROIQ <0.05     No results found for: CHOL, CHOLX, CHLST, CHOLV, HDL, HDLP, LDL, LDLC, DLDLP, TGLX, TRIGL, TRIGP, CHHD, CHHDX  No results found for: GLUCPOC  No results found for: COLOR, APPRN, SPGRU, REFSG, YRODAN, PROTU, GLUCU, KETU, BILU, UROU, REECE, LEUKU, GLUKE, EPSU, BACTU, WBCU, RBCU, CASTS, UCRY      Medications Reviewed:     Current Facility-Administered Medications   Medication Dose Route Frequency    guaiFENesin-codeine (ROBITUSSIN AC) 100-10 mg/5 mL solution 10 mL  10 mL Oral Q4H PRN    benzonatate (TESSALON) capsule 200 mg  200 mg Oral TID    zinc sulfate (ZINCATE) 50 mg zinc (220 mg) capsule 1 Capsule  1 Capsule Oral DAILY    sodium chloride (NS) flush 5-40 mL  5-40 mL IntraVENous Q8H    sodium chloride (NS) flush 5-40 mL  5-40 mL IntraVENous PRN    acetaminophen (TYLENOL) tablet 650 mg  650 mg Oral Q6H PRN    polyethylene glycol (MIRALAX) packet 17 g  17 g Oral DAILY PRN    ondansetron (ZOFRAN) injection 4 mg  4 mg IntraVENous Q6H PRN    cholecalciferol (VITAMIN D3) (1000 Units /25 mcg) tablet 2,000 Units  2,000 Units Oral DAILY    dexamethasone (DECADRON) 4 mg/mL injection 6 mg  6 mg IntraVENous Q24H    ascorbic acid (vitamin C) (VITAMIN C) tablet 500 mg  500 mg Oral BID    remdesivir 100 mg in 0.9% sodium chloride 250 mL IVPB  100 mg IntraVENous Q24H    albuterol (PROVENTIL HFA, VENTOLIN HFA, PROAIR HFA) inhaler 2 Puff  2 Puff Inhalation Q4H RT    enoxaparin (LOVENOX) injection 40 mg  40 mg SubCUTAneous Q12H     ______________________________________________________________________  EXPECTED LENGTH OF STAY: - - -  ACTUAL LENGTH OF STAY:          1                 Sheryl Gustafson MD

## 2021-08-15 NOTE — PROGRESS NOTES
[phone] Hospital or Facility: [facility], [facility] From: Graciela Syed RE: Dalia sparks 1975 RM: [phone] pt need cough med with codeine, please Need Callback: NO CALLBACK REQ TELEMETRY

## 2021-08-15 NOTE — PROGRESS NOTES
[facility]    ICU TEAM Progress Note    Name: Janae Mcgrath   : 1975   MRN: [id number]   Date: 8/15/2021           ICU Assessment     1. COVID-19 PNA (diagnosed  - unvaccinated)  2. Acute Hypoxic Respiratory Failure  3. ARDS  4. NABEEL  5. Obesity (BMI 37)                         ICU Comprehensive Plan of Care:     1. Neurological System  PRN Precedex  Spontaneous Awakening Trial: N/A  Sedation: None  Analgesia: Fentanyl and Oxycodone    2. Cardiovascular System  SBP Goal of: > 90 mmHg  MAP Goal of: > 65 mmHg  Transfusion Trigger (Hgb): <7 g/dL  Keep K > 4; Mg > 2     3. Respiratory System  Prone as able  Head of bed > 30 degrees  Aggressive bronchopulmonary hygiene  Incentive spirometry  Spontaneous Breathing Trial: N/A  Pulmonary toilet - bronchodilators  SpO2 Goal: > 92%   DVT Prophylaxis: SCD's or Sequential Compression Device and Lovenox     4. Renal/GI/Endocrine System  IVFs: NS  Ulcer Prophylaxis: Protonix (pantoprazole)   Bowel Regimen: Senna and Docusate (Colace)  Feeding:  Yes   Blood Sugar Goal 120-180 - Glycemic Control: Insulin    5. Infectious Disease  Indwelling Catheter:  Tubes: None  Lines: Peripheral IV  Drains: None  COVID Treatment:  Immunomodulator--Steroids -- Yes Decadron   Antiviral--Remdesivir -- Yes Per Protocol  Vitamin C -- Yes    Zinc -- Yes     Actemra    6. PT/OT: PT consulted and on board and OT consulted and on board     7. Goals of Care Discussion with family Yes     8. Plan of Care/Code Status: Full Code    9. Discussed Care Plan with Bedside RN    10. Documentation of Current Medications    Subjective:   Progress Note: 8/15/2021      Reason for ICU Admission: COVID 19 PNA - Progressively getting worse     HPI:  (from H/P as patient is unable to provide history as he is tachypnic)   Darrian Moody is a 51 y. o. male who presents with NO PMHx presents to hospital with SOB that has been worsening over past 2 days.  Patient states that his whole family has COVID, starting with his daughter. Patient states that he started to have body aches, fever, since last Sunday on 8/8. Pt was seen in ER on 8/12 at which time he had pneumonia but presume he was not hypoxic and sent home on Prednisone and Azithromycin. Patient states after going home he has progressively worsened. Patient now having worsening cough which is dry and SOB. Patient states he has been having fever of 102 at home. Patient is NOT vaccinated against COVID-19. Patient states he and his son were planning to get COVID Vaccine soon. No n/v diarrhea, abdominal pain,dysuria, weakness, numbness. ICU consulted for increasing oxygen requirements. Overnight Events:   8/15 -- Rapidly increasing oxygen requirements    POD:  * No surgery found *    S/P:       Active Problem List:     Problem List  Never Reviewed        Codes Class    Severe sepsis ([facility] [address] ICD-10-CM: A41.9, R65.20  ICD-9-CM: 038.9, 995.92         Pneumonia due to COVID-19 virus ICD-10-CM: U07.1, J12.82  ICD-9-CM: 480.8, 079.89         ACL tear ICD-10-CM: T43.440A  ICD-9-CM: 844.2               Past Medical History:      has no past medical history of Abuse, Anemia NEC, Calculus of kidney, Congestive heart failure, unspecified, Contact dermatitis and other eczema, due to unspecified cause, Depression, Headache(784.0), Hypercholesterolemia, Psychotic disorder, or Trauma. Past Surgical History:      has a past surgical history that includes hx heent and hx orthopaedic. Home Medications:     Prior to Admission medications    Medication Sig Start Date End Date Taking? Authorizing Provider   azithromycin (ZITHROMAX) 250 mg tablet Take 2 tablets today, then take 1 tablet daily 8/12/21   [place] Pears, NP   predniSONE (DELTASONE) 50 mg tablet Take 1 Tablet by mouth daily for 3 days.  8/12/21 8/15/21  [place] Pears, NP   albuterol sulfate (PROAIR RESPICLICK) 90 mcg/actuation breath activated inhaler Take 2 Puffs by inhalation four (4) times daily as needed for Cough (SOB, wheezing). 21   Cori Sickle, NP   benzonatate (Tessalon Perles) 100 mg capsule Take 1 Capsule by mouth three (3) times daily as needed for Cough for up to 7 days. 21  Cori Sickle, NP   EPINEPHrine (EPIPEN 2-SOLITARIO) 0.3 mg/0.3 mL (1:1,000) injection 0.3 mL by IntraMUSCular route once as needed for 1 dose. 13   Theodora Cheadle, MD   diazepam (VALIUM) 5 mg tablet Take 1 Tab by mouth every eight (8) hours as needed (spasm). 12   LIBERTY Langley       Allergies/Social/Family History:     No Known Allergies   Social History     Tobacco Use    Smoking status: Former Smoker     Packs/day: 1.00     Years: 10.00     Pack years: 10.00    Smokeless tobacco: Never Used   Substance Use Topics    Alcohol use: Yes     Alcohol/week: 3.3 standard drinks     Types: 4 Cans of beer per week      No family history on file. Review of Systems:     A comprehensive review of systems was negative except for that written in the HPI. Objective:   Vital Signs:  Visit Vitals  BP (!) 149/105   Pulse 75   Temp 98.8 °F (37.1 °C)   Resp (!) 35   Ht 5' 10\" (1.778 m)   Wt 117.9 kg (260 lb)   SpO2 97%   BMI 37.31 kg/m²    O2 Flow Rate (L/min): 40 l/min (increased per protocol) O2 Device: BIPAP Temp (24hrs), Av.9 °F (37.2 °C), Min:98.6 °F (37 °C), Max:99.4 °F (37.4 °C)           Intake/Output:     Intake/Output Summary (Last 24 hours) at 8/15/2021 1610  Last data filed at 8/15/2021 0800  Gross per 24 hour   Intake    Output 350 ml   Net -350 ml       Physical Exam:    General appearance: cooperative, mild distress, appears stated age  Head: Normocephalic, without obvious abnormality, atraumatic  Eyes: negative  Throat: Lips, mucosa, and tongue normal. Teeth and gums normal  Lungs: diminished breath sounds R apex, L apex  Chest wall: no tenderness  Heart: regular rate and rhythm, S1, S2 normal, no murmur, click, rub or gallop  Abdomen: soft, non-tender.  Bowel sounds normal. No masses,  no organomegaly  Extremities: extremities normal, atraumatic, no cyanosis or edema  Pulses: 2+ and symmetric  Skin: Skin color, texture, turgor normal. No rashes or lesions  Neurologic: Grossly normal    LABS AND  DATA: Personally reviewed  Recent Labs     08/15/21  0903 08/14/21  0909   WBC 3.2* 7.6   HGB 15.4 15.5   HCT 47.3 47.3   * 132*     Recent Labs     08/15/21  0903 08/14/21  0913 08/14/21  0909   *  --  131*   K 4.4  --  4.6     --  102   CO2 27  --  23   BUN 23*  --  25*   CREA 1.10  --  1.30   *  --  125*   CA 8.1*  --  8.6   MG  --  2.4  --      Recent Labs     08/15/21  0903 08/14/21  0909   AP 76 69   TP 7.0 7.8   ALB 3.4* 4.0   GLOB 3.6 3.8     No results for input(s): INR, PTP, APTT, INREXT in the last 72 hours. Recent Labs     08/14/21  1004   PHI 7.40   PCO2I 37.3   PO2I 54*     Recent Labs     08/14/21  0909   TROIQ <0.05       Hemodynamics:   PAP:   CO:     Wedge:   CI:     CVP:    SVR:       PVR:       Ventilator Settings:  Mode Rate Tidal Volume Pressure FiO2 PEEP            100 %       Peak airway pressure:      Minute ventilation: 16.5 l/min        MEDS: Reviewed    Multidisciplinary Rounds Completed: Yes    ABCDEF Bundle/Checklist Completed:  Yes    SPECIAL EQUIPMENT  None    DISPOSITION  Stay in ICU    CRITICAL CARE CONSULTANT NOTE  I had a face to face encounter with the patient, reviewed and interpreted patient data including clinical events, labs, images, vital signs, I/O's, and examined patient. I have discussed the case and the plan and management of the patient's care with the consulting services, the bedside nurses and the respiratory therapist.      NOTE OF PERSONAL INVOLVEMENT IN CARE   This patient has a high probability of imminent, clinically significant deterioration, which requires the highest level of preparedness to intervene urgently.  I participated in the decision-making and personally managed or directed the management of the following life and organ supporting interventions that required my frequent assessment to treat or prevent imminent deterioration. I personally spent 90 minutes of critical care time. This is time spent at this critically ill patient's bedside actively involved in patient care as well as the coordination of care. This does not include any procedural time which has been billed separately.     Bernie Trent DO, MS  Staff Intensivist/Anesthesiologist  [facility]  8/15/2021

## 2021-08-15 NOTE — PROGRESS NOTES
Pulmonary, Critical Care, and Sleep Medicine    Initial Patient Consult    Name: Marie November MRN: [id number]   : 1975 Hospital: [address]   Date: 8/15/2021        IMPRESSION:   · Acute hypoxic respiratory failure  · COVID-19 pneumonia  · ARDS  · NABEEL      RECOMMENDATIONS:   · Maintain O2 sats > 90%; currently on BiPAP at 100% FiO2  · Dexamethasone x 10 days  · Remdesivir x 5 days  · S/p Tocilizumab   · Bronchodilators Q4  · Benzonatate  · Monitor creatinine  · Prone positioning if able  · DVT prophylaxis: Lovenox  · High risk for further decompensation. Would recommend ICU admission for possible intubation. Subjective: This patient has been seen and evaluated at the request of Dr. Leonel Gong for \"Actemra\". Patient is a 51 y.o. unvaccinated male with no significant PMH who was diagnosed with COVID on 21. He has had progressively worsening dyspnea since that time and now is hypoxic, requiring HFNC to maintain SpO2 > 90%. Overnight Events:  Afebrile  HR stable  BP elevated  Tacypneic  Now requiring BiPAP at 100% FiO2  D-dimer 0.50  CRP : 10.30    ROS:  Breathing is better on BiPAP, but coughing significantly and desaturating in to low 80's with cough even on BiPAP. No past medical history on file. Past Surgical History:   Procedure Laterality Date    HX HEENT      ORAL SURGERY    HX ORTHOPAEDIC      left knee      Prior to Admission medications    Medication Sig Start Date End Date Taking? Authorizing Provider   azithromycin (ZITHROMAX) 250 mg tablet Take 2 tablets today, then take 1 tablet daily 21   Zahra Valentine NP   predniSONE (DELTASONE) 50 mg tablet Take 1 Tablet by mouth daily for 3 days. 8/12/21 8/15/21  Zahra Valentine NP   albuterol sulfate (PROAIR RESPICLICK) 90 mcg/actuation breath activated inhaler Take 2 Puffs by inhalation four (4) times daily as needed for Cough (SOB, wheezing).  21   Zahra Valentine NP   benzonatate Chayito Malik Perles) 100 mg capsule Take 1 Capsule by mouth three (3) times daily as needed for Cough for up to 7 days. 21  Esmer Camejo NP   EPINEPHrine (EPIPEN 2-SOLITARIO) 0.3 mg/0.3 mL (1:1,000) injection 0.3 mL by IntraMUSCular route once as needed for 1 dose. 13   Quynh Louie MD   diazepam (VALIUM) 5 mg tablet Take 1 Tab by mouth every eight (8) hours as needed (spasm). 12   LIBERTY Caba     No Known Allergies   Social History     Tobacco Use    Smoking status: Former Smoker     Packs/day: 1.00     Years: 10.00     Pack years: 10.00    Smokeless tobacco: Never Used   Substance Use Topics    Alcohol use: Yes     Alcohol/week: 3.3 standard drinks     Types: 4 Cans of beer per week      No family history on file. Current Facility-Administered Medications   Medication Dose Route Frequency    benzonatate (TESSALON) capsule 200 mg  200 mg Oral TID    zinc sulfate (ZINCATE) 50 mg zinc (220 mg) capsule 1 Capsule  1 Capsule Oral DAILY    sodium chloride (NS) flush 5-40 mL  5-40 mL IntraVENous Q8H    cholecalciferol (VITAMIN D3) (1000 Units /25 mcg) tablet 2,000 Units  2,000 Units Oral DAILY    dexamethasone (DECADRON) 4 mg/mL injection 6 mg  6 mg IntraVENous Q24H    ascorbic acid (vitamin C) (VITAMIN C) tablet 500 mg  500 mg Oral BID    remdesivir 100 mg in 0.9% sodium chloride 250 mL IVPB  100 mg IntraVENous Q24H    albuterol (PROVENTIL HFA, VENTOLIN HFA, PROAIR HFA) inhaler 2 Puff  2 Puff Inhalation Q4H RT    enoxaparin (LOVENOX) injection 40 mg  40 mg SubCUTAneous Q12H       Review of Systems:  A comprehensive review of systems was negative except for that written in the HPI.     Objective:   Vital Signs:    Visit Vitals  BP (!) 155/108   Pulse 83   Temp 99.4 °F (37.4 °C)   Resp (!) 34   Ht 5' 10\" (1.778 m)   Wt 117.9 kg (260 lb)   SpO2 93%   BMI 37.31 kg/m²       O2 Device: BIPAP   O2 Flow Rate (L/min): 40 l/min (increased per protocol)   Temp (24hrs), Av.9 °F (37.7 °C), Min:99.4 °F (37.4 °C), Max:100.4 °F (38 °C)       Intake/Output:   Last shift:      No intake/output data recorded. Last 3 shifts: 08/13 1901 - 08/15 0700  In: 1250 [I.V.:1250]  Out: -     Intake/Output Summary (Last 24 hours) at 8/15/2021 0934  Last data filed at 8/14/2021 1500  Gross per 24 hour   Intake 1250 ml   Output    Net 1250 ml      Physical Exam:   General:  Alert, ill appearing, increased WOB   Head:  Normocephalic, without obvious abnormality, atraumatic. Eyes:  Conjunctivae/corneas clear. Nose: Nares normal. Septum midline. Mucosa normal.    Throat: Lips, mucosa, and tongue normal.     Neck: Supple, symmetrical, trachea midline    Back:   Symmetric, no curvature. ROM normal.   Lungs: Tachypneic, distant bilateral breath sounds   Chest wall:  No tenderness or deformity. Heart:  Regular rate and rhythm    Abdomen:   Soft, non-tender. Bowel sounds normal.     Extremities: Extremities normal, atraumatic, no cyanosis or edema.    Skin: Skin color, texture, turgor normal. No rashes or lesions   Neurologic: Grossly nonfocal     Data review:     Recent Results (from the past 24 hour(s))   POC EG7    Collection Time: 08/14/21 10:04 AM   Result Value Ref Range    Calcium, ionized (POC) 1.14 1.12 - 1.32 mmol/L    pH (POC) 7.40 7.35 - 7.45      pCO2 (POC) 37.3 35.0 - 45.0 MMHG    pO2 (POC) 54 (L) 80 - 100 MMHG    HCO3 (POC) 23.2 22 - 26 MMOL/L    Base deficit (POC) 1.1 mmol/L    sO2 (POC) 87.8 (L) 92 - 97 %    Site LEFT RADIAL      Device: NASAL CANNULA      Allens test (POC) Positive      Specimen type (POC) ARTERIAL     CULTURE, BLOOD    Collection Time: 08/14/21 10:50 AM    Specimen: Blood   Result Value Ref Range    Special Requests: NO SPECIAL REQUESTS      Culture result: NO GROWTH AFTER 17 HOURS     EKG, 12 LEAD, INITIAL    Collection Time: 08/14/21 11:37 AM   Result Value Ref Range    Ventricular Rate 104 BPM    Atrial Rate 104 BPM    P-R Interval 154 ms    QRS Duration 82 ms    Q-T Interval 316 ms    QTC Calculation (Bezet) 415 ms    Calculated P Axis 21 degrees    Calculated R Axis -33 degrees    Calculated T Axis 28 degrees    Diagnosis       Sinus tachycardia  Left axis deviation  When compared with ECG of 29-AUG-2013 11:55,  Vent. rate has increased BY  39 BPM  Questionable change in QRS duration  Confirmed by Sharolyn Homans ([id number]) on 8/14/2021 3:37:37 PM     D DIMER    Collection Time: 08/14/21  1:04 PM   Result Value Ref Range    D-dimer 0.36 0.00 - 0.65 mg/L FEU   LACTIC ACID    Collection Time: 08/14/21  1:04 PM   Result Value Ref Range    Lactic acid 1.2 0.4 - 2.0 MMOL/L   CBC WITH AUTOMATED DIFF    Collection Time: 08/15/21  9:03 AM   Result Value Ref Range    WBC 3.2 (L) 4.1 - 11.1 K/uL    RBC 5.21 4.10 - 5.70 M/uL    HGB 15.4 12.1 - 17.0 g/dL    HCT 47.3 36.6 - 50.3 %    MCV 90.8 80.0 - 99.0 FL    MCH 29.6 26.0 - 34.0 PG    MCHC 32.6 30.0 - 36.5 g/dL    RDW 13.9 11.5 - 14.5 %    PLATELET 631 (L) 240 - 400 K/uL    MPV 11.4 8.9 - 12.9 FL    NRBC 0.0 0  WBC    ABSOLUTE NRBC 0.00 0.00 - 0.01 K/uL    NEUTROPHILS 67 32 - 75 %    LYMPHOCYTES 26 12 - 49 %    MONOCYTES 7 5 - 13 %    EOSINOPHILS 0 0 - 7 %    BASOPHILS 0 0 - 1 %    IMMATURE GRANULOCYTES 0 0.0 - 0.5 %    ABS. NEUTROPHILS 2.1 1.8 - 8.0 K/UL    ABS. LYMPHOCYTES 0.8 0.8 - 3.5 K/UL    ABS. MONOCYTES 0.2 0.0 - 1.0 K/UL    ABS. EOSINOPHILS 0.0 0.0 - 0.4 K/UL    ABS. BASOPHILS 0.0 0.0 - 0.1 K/UL    ABS. IMM. GRANS. 0.0 0.00 - 0.04 K/UL    DF AUTOMATED     D DIMER    Collection Time: 08/15/21  9:03 AM   Result Value Ref Range    D-dimer 0.50 0.00 - 0.65 mg/L FEU       Imaging:  I have personally reviewed the patients radiographs and have reviewed the reports:  Diffuse bilateral infiltrates.           Shashi Rivera, NP

## 2021-08-15 NOTE — PROGRESS NOTES
TRANSFER - OUT REPORT:    Verbal report given to Amy on Katherin Push  being transferred to ICU 7107 for urgent transfer       Report consisted of patients Situation, Background, Assessment and   Recommendations(SBAR). Information from the following report(s) SBAR, Kardex, Intake/Output, MAR and Cardiac Rhythm   was reviewed with the receiving nurse. Lines:   Peripheral IV 08/14/21 Left Antecubital (Active)   Site Assessment Clean, dry, & intact 08/15/21 1200   Phlebitis Assessment 0 08/15/21 1200   Infiltration Assessment 0 08/15/21 1200   Dressing Status Clean, dry, & intact 08/15/21 1200   Dressing Type Transparent 08/15/21 1200   Hub Color/Line Status Pink 08/15/21 1200   Action Taken Open ports on tubing capped 08/15/21 1200        Opportunity for questions and clarification was provided.       Patient transported with:   Monitor  O2 @ BIPAP with RT liters  Patient's medications from home  Patient-specific medications from Pharmacy  Registered Nurse  Tech

## 2021-08-16 NOTE — PROGRESS NOTES
Bedside and Verbal shift change report given to [address] (oncoming nurse) by Gerardo Crandall (offgoing nurse). Report included the following information SBAR, Kardex, Procedure Summary, Intake/Output, MAR, Recent Results, Med Rec Status, Alarm Parameters  and Dual Neuro Assessment. Shift Summary: Patient continues to need BiPAP 100% FiO2. Up in chair all day without concern. Fluids rate changed to 10mL/her. Began diuresing. 2x lasix 40 mg. Will recheck labs at 2000. Bedside and Verbal shift change report given to Aura CARBONE (oncoming nurse) by Rhea Newberry RN (offgoing nurse). Report included the following information SBAR, Kardex, Procedure Summary, Intake/Output, MAR, Recent Results, Med Rec Status, Alarm Parameters  and Dual Neuro Assessment.

## 2021-08-16 NOTE — PROGRESS NOTES
[facility]    ICU TEAM Progress Note    Name: Vermell Gaucher   : 1975   MRN: [id number]   Date: 2021      Subjective:   Progress Note: 2021      Mr Bradley Mitchell is a 50 yo male who presented to the ED on  with no prior PMH with SOB x2 days. He had developed body aches and fever as of . He was diagnosed with COVID-19 pneumonia. He was not vaccinated. He was started on dexamethasone, remdesivir, and tocilizumab. He was admitted to the ICU on 8/15 for worsening hypoxia and required BiPAP. Upon exam this am, he was sitting in the chair, dyspneic on BiPAP 100%     Active Problem List:     Problem List  Never Reviewed        Codes Class    Severe sepsis ([facility] [address] ICD-10-CM: A41.9, R65.20  ICD-9-CM: 038.9, 995.92         Pneumonia due to COVID-19 virus ICD-10-CM: U07.1, J12.82  ICD-9-CM: 480.8, 079.89         ACL tear ICD-10-CM: Z46.900F  ICD-9-CM: 844.2               Past Medical History:      has no past medical history of Abuse, Anemia NEC, Calculus of kidney, Congestive heart failure, unspecified, Contact dermatitis and other eczema, due to unspecified cause, Depression, Headache(784.0), Hypercholesterolemia, Psychotic disorder, or Trauma. Past Surgical History:      has a past surgical history that includes hx heent and hx orthopaedic. Home Medications:     Prior to Admission medications    Medication Sig Start Date End Date Taking? Authorizing Provider   azithromycin (ZITHROMAX) 250 mg tablet Take 2 tablets today, then take 1 tablet daily 21   [place] Pears, NP   predniSONE (DELTASONE) 50 mg tablet Take 1 Tablet by mouth daily for 3 days. 8/12/21 8/15/21  Wellman Pears, NP   albuterol sulfate (PROAIR RESPICLICK) 90 mcg/actuation breath activated inhaler Take 2 Puffs by inhalation four (4) times daily as needed for Cough (SOB, wheezing).  21   Wellman Pears, NP   benzonatate (Tessalon Perles) 100 mg capsule Take 1 Capsule by mouth three (3) times daily as needed for Cough for up to 7 days. 21  Shari Sam NP   EPINEPHrine (EPIPEN 2-SOLITARIO) 0.3 mg/0.3 mL (1:1,000) injection 0.3 mL by IntraMUSCular route once as needed for 1 dose. 13   Jefferson Kendall MD   diazepam (VALIUM) 5 mg tablet Take 1 Tab by mouth every eight (8) hours as needed (spasm). 12   LIBERTY Mckeon       Allergies/Social/Family History:     No Known Allergies   Social History     Tobacco Use    Smoking status: Former Smoker     Packs/day: 1.00     Years: 10.00     Pack years: 10.00    Smokeless tobacco: Never Used   Substance Use Topics    Alcohol use: Yes     Alcohol/week: 3.3 standard drinks     Types: 4 Cans of beer per week      No family history on file.     Review of Systems:     Unable to provide due to dyspnea    Objective:   Vital Signs:  Visit Vitals  /74   Pulse 89   Temp 99 °F (37.2 °C)   Resp 29   Ht 5' 10\" (1.778 m)   Wt 112.2 kg (247 lb 5.7 oz)   SpO2 94%   BMI 35.49 kg/m²    O2 Flow Rate (L/min): 40 l/min (increased per protocol) O2 Device: BIPAP Temp (24hrs), Av.5 °F (36.9 °C), Min:97.8 °F (36.6 °C), Max:99 °F (37.2 °C)           Intake/Output:     Intake/Output Summary (Last 24 hours) at 2021 0905  Last data filed at 2021 0700  Gross per 24 hour   Intake 1063.75 ml   Output 900 ml   Net 163.75 ml       Physical Exam:    General:Awake, alert, on BiPAP, moderate dypnea  Eye: PERRLA  Neurologic:  oriented, grossly non-focal  Neck:  Supple, no JVD  Lungs:CTAB, diminished bases, on BiPAP  Heart:  RRR, 2+ pulses, 1+ edema of extremities  Abdomen:  Soft, nontender, nondistended  Skin:c/d/i    LABS AND  DATA: Personally reviewed  Recent Labs     21  0341 08/15/21  0903   WBC 3.3* 3.2*   HGB 15.6 15.4   HCT 47.2 47.3    144*     Recent Labs     21  0341 08/15/21  0903 21  0913 21  0909    135*  --    < >   K 4.7 4.4  --    < >    105  --    < >   CO2 24 27  --    < >   BUN 27* 23*  --    < > CREA 1.04 1.10  --    < >   * 121*  --    < >   CA 8.5 8.1*  --    < >   MG  --   --  2.4  --     < > = values in this interval not displayed. Recent Labs     08/16/21  0341 08/15/21  0903   AP 76 76   TP 6.6 7.0   ALB 3.3* 3.4*   GLOB 3.3 3.6     Recent Labs     08/16/21  0341   INR 1.1   PTP 11.3*   APTT 28.5      Recent Labs     08/14/21  1004   PHI 7.40   PCO2I 37.3   PO2I 54*     Recent Labs     08/16/21  0341 08/14/21  0909   TROIQ <0.05 <0.05     BiPAP 100% 16/8    MEDS: Reviewed    Chest X-Ray: personally reviewed and report checked    [address] 8/14: Sinus tachycardia    Assessment:     Acute hypoxic respiratory failure secondary to COVID-19 Pneumonia: Admitted to the hospital 8/14. Initial symptoms 8/8. Unvaccinated. He was started on dexamethasone, remdesivir, and tocilizumab. He was admitted to the ICU on 8/15 for worsening hypoxia and required BiPAP. - Diuresis goal -500 to 1L with lasix  - Wean BiPAP as tolerated  - OOB to chair as tolerated for V/Q  - Dexamethasone  - Zinc  - Vitamin C  - Lovenox    NABEEL: Resolving  - Diuresis as above  - Daily BMP    Acute deconditioning c/b obesity:  - OOB to chair as tolerated for V/Q and pulmonary offloading     Multidisciplinary Rounds Completed: Yes    ABCDEF Bundle/Checklist  Pain Medications: None  Target RASS: 0 - Alert & Calm - Spontaneously pays attention to caregiver  Sedation Medications: None  CAM-ICU:  Negative  Mobility: Fair  PT/OT: PT consulted and on board and OT consulted and on board   Restraints: None needed at this time  Discussed Plan of Care (goals of care):  Yes  Addressed Code Status: Full Code    CARDIOVASCULAR  Cardiac Gtts: None  SBP Goal of: > 90 mmHg  MAP Goal of: > 65 mmHg  Transfusion Trigger (Hgb): <7 g/dL    RESPIRATORY  Vent Goals:   Optimize PEEP/Ventilation/Oxygenation  DVT Prophylaxis (if no, list reason): Lovenox   SPO2 Goal: > 92%  Pulmonary toilet: Duo-Nebs     GI/  Brownlee Catheter Present: No  GI Prophylaxis: Protonix (pantoprazole)   Nutrition: Yes Diet  IVFs: None  Bowel Movement: Yes  Bowel Regimen: Docusate (Colace)  Insulin: N    ANTIBIOTICS  Antibiotics:  N    T/L/D  Tubes: None  Lines: None  Drains: None    SPECIAL EQUIPMENT  None    DISPOSITION  Stay in ICU    CRITICAL CARE CONSULTANT NOTE  I had a face to face encounter with the patient, reviewed and interpreted patient data including clinical events, labs, images, vital signs, I/O's, and examined patient. I have discussed the case and the plan and management of the patient's care with the consulting services, the bedside nurses and the respiratory therapist.      NOTE OF PERSONAL INVOLVEMENT IN CARE   This patient has a high probability of imminent, clinically significant deterioration, which requires the highest level of preparedness to intervene urgently. I participated in the decision-making and personally managed or directed the management of the following life and organ supporting interventions that required my frequent assessment to treat or prevent imminent deterioration. I personally spent 45 minutes of critical care time. This is time spent at this critically ill patient's bedside actively involved in patient care as well as the coordination of care and discussions with the patient's family. This does not include any procedural time which has been billed separately.     Azalia Rhoades NP  [facility]  8/16/2021

## 2021-08-16 NOTE — PROGRESS NOTES
Reviewed chart for transitions of care, and discussed in rounds. NAVID spoke with patient's radhafrienferoz Bolivar [phone] to introduce self and explain role  Baseline:   ADLs/IDALS:Independent  Previous Home Health:None  Previous SNF/IPR:None  ER Contact: Lynda Bolivar [phone], Sister: Mary Murrieta [phone]    Patient lives in a 2 level house with 2 steps to enter. Patient is independent with ADLs/IDALs   Patient has no previous HH or equipment needs. . Patient's preferred pharmacy is [facility].  Patient's family  is expected to transport at discharge. Reason for Admission:  COVID Pneumonia                     RUR Score:  12%                   Plan for utilizing home health:    TBD      PCP: First and Last name:  None     Name of Practice:    Are you a current patient: Yes/No:    Approximate date of last visit:    Can you participate in a virtual visit with your PCP:                     Current Advanced Directive/Advance Care Plan: Full Code      Healthcare Decision Maker:   Click here to complete [address] including selection of the Healthcare Decision Maker Relationship (ie \"Primary\")           Patient in ICU on isolation for COVID 19 pneumonia. NAVID spoke with patient's girlfrienferoz Bolivar to introduce self and explain role. Patient lives independently with Tigrepepe Noonandarinel and patient's 2 minor children. Patient's sister Mary Murrieta [phone] is Yeimi Lawson. Per Tigre Medrano patient does not have a PCP. Confirmed insurance to be [employer]. Care management will follow for transitions of care.    Edmond Maldonado RN,Care Management  Care Management Interventions  MyChart Signup: No  Discharge Durable Medical Equipment: No  Physical Therapy Consult: No  Occupational Therapy Consult: No  Speech Therapy Consult: No  Current Support Network: Own Home (Lynda Bolivar [phone])

## 2021-08-16 NOTE — PROGRESS NOTES
Outgoing calls placed to patient regarding recent visit to [facility]. no answer message left via voicemail no response episode of care closed  Unable to contact patient.

## 2021-08-16 NOTE — PROGRESS NOTES
1930: Bedside and Verbal shift change report given to [address] (oncoming nurse) by Val Herbert RN (offgoing nurse). Report included the following information SBAR, Kardex, ED Summary, Intake/Output, MAR, Recent Results, Cardiac Rhythm SR and Alarm Parameters . 0730: Bedside and Verbal shift change report given to [address] (oncoming nurse) by [address] (offgoing nurse). Report included the following information SBAR, Kardex, ED Summary, Intake/Output, MAR, Recent Results, Cardiac Rhythm SR and Alarm Parameters .

## 2021-08-17 NOTE — PROGRESS NOTES
1930 Bedside and Verbal shift change report given to Aura RN (oncoming nurse) by Richard Fry RN (offgoing nurse). Report included the following information SBAR, Kardex, ED Summary, Intake/Output, MAR, Recent Results, Cardiac Rhythm NSR and Alarm Parameters . Shift Summary: Uneventful shift, BIPAP O2 weaned to 90%, pt tolerating well with stable O2 sats. 0730: Bedside and Verbal shift change report given to [address] (oncoming nurse) by Kervin Tate RN (offgoing nurse). Report included the following information SBAR, Kardex, ED Summary, Intake/Output, MAR, Recent Results, Cardiac Rhythm NSR and Alarm Parameters .

## 2021-08-17 NOTE — PROGRESS NOTES
[facility]    ICU TEAM Progress Note    Name: Vermell Gaucher   : 1975   MRN: [id number]   Date: 2021      Subjective:   Progress Note: 2021      Mr Bradley Mitchell is a 52 yo male who presented to the ED on  with no prior PMH with SOB x2 days. He had developed body aches and fever as of . He was diagnosed with COVID-19 pneumonia. He was not vaccinated. He was started on dexamethasone, remdesivir, and tocilizumab. He was admitted to the ICU on 8/15 for worsening hypoxia and required BiPAP. Upon exam this am, he was sitting in the chair, on BiPAP 80% . WOB improved as compared to yesterday. Active Problem List:     Problem List  Never Reviewed        Codes Class    Severe sepsis ([facility] [address] ICD-10-CM: A41.9, R65.20  ICD-9-CM: 038.9, 995.92         Pneumonia due to COVID-19 virus ICD-10-CM: U07.1, J12.82  ICD-9-CM: 480.8, 079.89         ACL tear ICD-10-CM: Z32.407V  ICD-9-CM: 844.2               Past Medical History:      has no past medical history of Abuse, Anemia NEC, Calculus of kidney, Congestive heart failure, unspecified, Contact dermatitis and other eczema, due to unspecified cause, Depression, Headache(784.0), Hypercholesterolemia, Psychotic disorder, or Trauma. Past Surgical History:      has a past surgical history that includes hx heent and hx orthopaedic. Home Medications:     Prior to Admission medications    Medication Sig Start Date End Date Taking? Authorizing Provider   azithromycin (ZITHROMAX) 250 mg tablet Take 2 tablets today, then take 1 tablet daily 21   [place] Pears, NP   albuterol sulfate (PROAIR RESPICLICK) 90 mcg/actuation breath activated inhaler Take 2 Puffs by inhalation four (4) times daily as needed for Cough (SOB, wheezing). 21   [place] Pears, NP   benzonatate (Tessalon Perles) 100 mg capsule Take 1 Capsule by mouth three (3) times daily as needed for Cough for up to 7 days.  21  [place] Pears, NP EPINEPHrine (EPIPEN 2-SOLITARIO) 0.3 mg/0.3 mL (1:1,000) injection 0.3 mL by IntraMUSCular route once as needed for 1 dose. 13   Alma Osorio MD   diazepam (VALIUM) 5 mg tablet Take 1 Tab by mouth every eight (8) hours as needed (spasm). 12   LIBERTY Marti       Allergies/Social/Family History:     No Known Allergies   Social History     Tobacco Use    Smoking status: Former Smoker     Packs/day: 1.00     Years: 10.00     Pack years: 10.00    Smokeless tobacco: Never Used   Substance Use Topics    Alcohol use: Yes     Alcohol/week: 3.3 standard drinks     Types: 4 Cans of beer per week      No family history on file.     Review of Systems:     Unable to provide due to dyspnea    Objective:   Vital Signs:  Visit Vitals  /84   Pulse 63   Temp 97.9 °F (36.6 °C)   Resp 20   Ht 5' 10\" (1.778 m)   Wt 112.2 kg (247 lb 5.7 oz)   SpO2 93%   BMI 35.49 kg/m²    O2 Flow Rate (L/min): 40 l/min (increased per protocol) O2 Device: BIPAP Temp (24hrs), Av.5 °F (36.9 °C), Min:97.9 °F (36.6 °C), Max:99 °F (37.2 °C)           Intake/Output:     Intake/Output Summary (Last 24 hours) at 2021 1156  Last data filed at 2021 0900  Gross per 24 hour   Intake 1557.75 ml   Output 3200 ml   Net -1642.25 ml       Physical Exam:    General:Awake, alert, on BiPAP, moderate dypnea  Eye: PERRLA  Neurologic:  oriented, grossly non-focal  Neck:  Supple, no JVD  Lungs:CTAB, diminished bases, on BiPAP  Heart:  RRR, 2+ pulses, 1+ edema of extremities  Abdomen:  Soft, nontender, nondistended  Skin:c/d/i    LABS AND  DATA: Personally reviewed  Recent Labs     21  0304 21  0341   WBC 4.0* 3.3*   HGB 15.3 15.6   HCT 45.7 47.2    150     Recent Labs     08/1721    142   K 3.6 3.3*   * 115*   CO2 21 20*   BUN 27* 24*   CREA 0.94 0.74   * 120*   CA 7.4* 6.4*   MG 2.3 1.8   PHOS 3.2 2.5*     Recent Labs     21   AP 68 76   TP 5.8* 6.6 ALB 2.9* 3.3*   GLOB 2.9 3.3     Recent Labs     08/16/21  0341   INR 1.1   PTP 11.3*   APTT 28.5      No results for input(s): PHI, PCO2I, PO2I, FIO2I in the last 72 hours. Recent Labs     08/16/21  0341   TROIQ <0.05     BiPAP 80% 16/8    MEDS: Reviewed    Chest X-Ray: personally reviewed and report checked    [address] 8/14: Sinus tachycardia    Assessment:     Acute hypoxic respiratory failure secondary to COVID-19 Pneumonia: Admitted to the hospital 8/14. Initial symptoms 8/8. Unvaccinated. He was started on dexamethasone, remdesivir, and tocilizumab. He was admitted to the ICU on 8/15 for worsening hypoxia and required BiPAP. - Diuresis goal -500 to 1L with lasix  - Wean BiPAP as tolerated  - OOB to chair as tolerated for V/Q  - Dexamethasone  - Zinc  - Vitamin C  - Lovenox    NABEEL: Resolving  - Diuresis as above  - Daily BMP    Acute deconditioning c/b obesity:  - OOB to chair as tolerated for V/Q and pulmonary offloading     Multidisciplinary Rounds Completed: Yes    ABCDEF Bundle/Checklist  Pain Medications: None  Target RASS: 0 - Alert & Calm - Spontaneously pays attention to caregiver  Sedation Medications: None  CAM-ICU:  Negative  Mobility: Fair  PT/OT: PT consulted and on board and OT consulted and on board   Restraints: None needed at this time  Discussed Plan of Care (goals of care):  Yes  Addressed Code Status: Full Code    CARDIOVASCULAR  Cardiac Gtts: None  SBP Goal of: > 90 mmHg  MAP Goal of: > 65 mmHg  Transfusion Trigger (Hgb): <7 g/dL    RESPIRATORY  Vent Goals:   Optimize PEEP/Ventilation/Oxygenation  DVT Prophylaxis (if no, list reason): Lovenox   SPO2 Goal: > 92%  Pulmonary toilet: Duo-Nebs     GI/  Brownlee Catheter Present: No  GI Prophylaxis: Protonix (pantoprazole)   Nutrition: Yes Diet  IVFs: None  Bowel Movement: Yes  Bowel Regimen: Docusate (Colace)  Insulin: N    ANTIBIOTICS  Antibiotics:  N    T/L/D  Tubes: None  Lines: None  Drains: None    SPECIAL EQUIPMENT  None    DISPOSITION  Stay in ICU    CRITICAL CARE CONSULTANT NOTE  I had a face to face encounter with the patient, reviewed and interpreted patient data including clinical events, labs, images, vital signs, I/O's, and examined patient. I have discussed the case and the plan and management of the patient's care with the consulting services, the bedside nurses and the respiratory therapist.      NOTE OF PERSONAL INVOLVEMENT IN CARE   This patient has a high probability of imminent, clinically significant deterioration, which requires the highest level of preparedness to intervene urgently. I participated in the decision-making and personally managed or directed the management of the following life and organ supporting interventions that required my frequent assessment to treat or prevent imminent deterioration. I personally spent 45 minutes of critical care time. This is time spent at this critically ill patient's bedside actively involved in patient care as well as the coordination of care and discussions with the patient's family. This does not include any procedural time which has been billed separately.     Flaca Cabello NP  [facility]  8/17/2021

## 2021-08-17 NOTE — PROGRESS NOTES
0730: Bedside and Verbal shift change report given to Mechelle Tam RN (oncoming nurse) by Julian Woo RN (offgoing nurse). Report included the following information SBAR, Kardex, Intake/Output, MAR, Recent Results, Cardiac Rhythm NSR and Alarm Parameters . 1930: Bedside and Verbal shift change report given to TONA Singleton (oncoming nurse) by Mechelle Tam RN (offgoing nurse). Report included the following information SBAR, Kardex, Intake/Output, Accordion, Recent Results, Cardiac Rhythm NSR and Alarm Parameters .

## 2021-08-18 NOTE — PROGRESS NOTES
Bedside and Verbal shift change report given to [facility] (oncoming nurse) by Monica Villela (offgoing nurse). Report included the following information SBAR, Kardex, Procedure Summary, Intake/Output, MAR, Recent Results, Med Rec Status, Cardiac Rhythm NSR-Saqib and Alarm Parameters . Shift summary: Uneventful shift. Attempted Heat HiFLow, patient only tolerated for 10 minutes before oxygen saturations dipped into the 80's and then soon the 70s. BiPAP reapplied. Will attempt to give patient some PRN cough medicine as patient has frequent barking cough. Bedside and Verbal shift change report [facility] by Shari Ricketts RN (offgoing nurse). Report included the following information SBAR, Kardex, Procedure Summary, Intake/Output, MAR, Recent Results, Med Rec Status, Cardiac Rhythm NSR-Saqib and Alarm Parameters .

## 2021-08-18 NOTE — PROGRESS NOTES
Patient unable to tolerate HFNC O2 saturations decreased to 76%. Patient placed back on BIPAP at this time O2 saturations currently 95%. RN at bedside. Will continue to monitor patient.

## 2021-08-18 NOTE — PROGRESS NOTES
[facility]    ICU TEAM Progress Note    Name: Lisandra Holman   : 1975   MRN: [id number]   Date: 2021      Subjective:   Progress Note: 2021      Mr Sharif Jolley is a 50 yo male who presented to the ED on  with no prior PMH with SOB x2 days. He had developed body aches and fever as of . He was diagnosed with COVID-19 pneumonia. He was not vaccinated. He was started on dexamethasone, remdesivir, and tocilizumab. He was admitted to the ICU on 8/15 for worsening hypoxia and required BiPAP. Upon exam this am, he was sitting in the chair, on BiPAP 80% . WOB improved as compared to yesterday. Able to get self from chair to bed today. Active Problem List:     Problem List  Never Reviewed        Codes Class    Severe sepsis ([facility] [address] ICD-10-CM: A41.9, R65.20  ICD-9-CM: 038.9, 995.92         Pneumonia due to COVID-19 virus ICD-10-CM: U07.1, J12.82  ICD-9-CM: 480.8, 079.89         ACL tear ICD-10-CM: G49.564K  ICD-9-CM: 844.2               Past Medical History:      has no past medical history of Abuse, Anemia NEC, Calculus of kidney, Congestive heart failure, unspecified, Contact dermatitis and other eczema, due to unspecified cause, Depression, Headache(784.0), Hypercholesterolemia, Psychotic disorder, or Trauma. Past Surgical History:      has a past surgical history that includes hx heent and hx orthopaedic. Home Medications:     Prior to Admission medications    Medication Sig Start Date End Date Taking? Authorizing Provider   azithromycin (ZITHROMAX) 250 mg tablet Take 2 tablets today, then take 1 tablet daily 21   Orquidea Vaca NP   albuterol sulfate (PROAIR RESPICLICK) 90 mcg/actuation breath activated inhaler Take 2 Puffs by inhalation four (4) times daily as needed for Cough (SOB, wheezing). 21   Orquidea Vaca NP   benzonatate (Tessalon Perles) 100 mg capsule Take 1 Capsule by mouth three (3) times daily as needed for Cough for up to 7 days.  21 21  Corbin Bradshaw NP   EPINEPHrine (EPIPEN 2-SOLITARIO) 0.3 mg/0.3 mL (1:1,000) injection 0.3 mL by IntraMUSCular route once as needed for 1 dose. 13   Shiela Danielle MD   diazepam (VALIUM) 5 mg tablet Take 1 Tab by mouth every eight (8) hours as needed (spasm). 12   LIBERTY Rebolledo       Allergies/Social/Family History:     No Known Allergies   Social History     Tobacco Use    Smoking status: Former Smoker     Packs/day: 1.00     Years: 10.00     Pack years: 10.00    Smokeless tobacco: Never Used   Substance Use Topics    Alcohol use: Yes     Alcohol/week: 3.3 standard drinks     Types: 4 Cans of beer per week      No family history on file.     Review of Systems:     Unable to provide due to dyspnea    Objective:   Vital Signs:  Visit Vitals  BP (!) 137/115   Pulse 73   Temp 98.5 °F (36.9 °C)   Resp 23   Ht 5' 10\" (1.778 m)   Wt 109.5 kg (241 lb 8 oz)   SpO2 100%   BMI 34.65 kg/m²    O2 Flow Rate (L/min): 50 l/min O2 Device: BIPAP Temp (24hrs), Av.2 °F (36.8 °C), Min:98 °F (36.7 °C), Max:98.5 °F (36.9 °C)           Intake/Output:     Intake/Output Summary (Last 24 hours) at 2021 1715  Last data filed at 2021 1600  Gross per 24 hour   Intake 470 ml   Output 300 ml   Net 170 ml       Physical Exam:    General:Awake, alert, on BiPAP, moderate dypnea  Eye: PERRLA  Neurologic:  oriented, grossly non-focal  Neck:  Supple, no JVD  Lungs:CTAB, diminished bases, on BiPAP  Heart:  RRR, 2+ pulses, 1+ edema of extremities  Abdomen:  Soft, nontender, nondistended  Skin:c/d/i  Validated     LABS AND  DATA: Personally reviewed  Recent Labs     21  0334 21  0304   WBC 5.7 4.0*   HGB 16.1 15.3   HCT 48.1 45.7    187     Recent Labs     21  0334 21  0304    139   K 4.2 3.6    111*   CO2 24 21   BUN 29* 27*   CREA 1.16 0.94   * 121*   CA 8.4* 7.4*   MG 2.6* 2.3   PHOS 3.7 3.2     Recent Labs     21  0334 21   AP 80 68   TP 6.8 5.8*   ALB 3.5 2.9*   GLOB 3.3 2.9     Recent Labs     08/16/21  0341   INR 1.1   PTP 11.3*   APTT 28.5      No results for input(s): PHI, PCO2I, PO2I, FIO2I in the last 72 hours. Recent Labs     08/16/21  0341   TROIQ <0.05     BiPAP 80% 16/8    MEDS: Reviewed    Chest X-Ray: personally reviewed and report checked    [address] 8/14: Sinus tachycardia    Assessment:     Acute hypoxic respiratory failure secondary to COVID-19 Pneumonia: Admitted to the hospital 8/14. Initial symptoms 8/8. Unvaccinated. He was started on dexamethasone, remdesivir, and tocilizumab. He was admitted to the ICU on 8/15 for worsening hypoxia and required BiPAP. - Holding on diuresis due to NABEEL  - Wean BiPAP as tolerated  - OOB to chair as tolerated for V/Q  - Dexamethasone  - Zinc  - Vitamin C  - Lovenox    NABEEL:    - Holding on diuresis due to slight worsening  - Daily BMP    Acute deconditioning c/b obesity:  - OOB to chair as tolerated for V/Q and pulmonary offloading     Multidisciplinary Rounds Completed: Yes    ABCDEF Bundle/Checklist  Pain Medications: None  Target RASS: 0 - Alert & Calm - Spontaneously pays attention to caregiver  Sedation Medications: None  CAM-ICU:  Negative  Mobility: Fair  PT/OT: PT consulted and on board and OT consulted and on board   Restraints: None needed at this time  Discussed Plan of Care (goals of care):  Yes  Addressed Code Status: Full Code    CARDIOVASCULAR  Cardiac Gtts: None  SBP Goal of: > 90 mmHg  MAP Goal of: > 65 mmHg  Transfusion Trigger (Hgb): <7 g/dL    RESPIRATORY  Vent Goals:   Optimize PEEP/Ventilation/Oxygenation  DVT Prophylaxis (if no, list reason): Lovenox   SPO2 Goal: > 92%  Pulmonary toilet: Duo-Nebs     GI/  Brownlee Catheter Present: No  GI Prophylaxis: Protonix (pantoprazole)   Nutrition: Yes Diet  IVFs: None  Bowel Movement: Yes  Bowel Regimen: Docusate (Colace)  Insulin: N    ANTIBIOTICS  Antibiotics:  N    T/L/D  Tubes: None  Lines: None  Drains: None    SPECIAL EQUIPMENT  None    DISPOSITION  Stay in ICU    CRITICAL CARE CONSULTANT NOTE  I had a face to face encounter with the patient, reviewed and interpreted patient data including clinical events, labs, images, vital signs, I/O's, and examined patient. I have discussed the case and the plan and management of the patient's care with the consulting services, the bedside nurses and the respiratory therapist.      NOTE OF PERSONAL INVOLVEMENT IN CARE   This patient has a high probability of imminent, clinically significant deterioration, which requires the highest level of preparedness to intervene urgently. I participated in the decision-making and personally managed or directed the management of the following life and organ supporting interventions that required my frequent assessment to treat or prevent imminent deterioration. I personally spent 45 minutes of critical care time. This is time spent at this critically ill patient's bedside actively involved in patient care as well as the coordination of care and discussions with the patient's family. This does not include any procedural time which has been billed separately.     Jeimy Allison NP  [facility]  8/18/2021

## 2021-08-18 NOTE — PROGRESS NOTES
1930 Bedside and Verbal shift change report given to Areli RN (oncoming nurse) by Bakari/Brandy RN (offgoing nurse). Report included the following information SBAR, Intake/Output, MAR, Recent Results, Cardiac Rhythm NSR and Alarm Parameters . 0730 Bedside and Verbal shift change report given to Bernardo RN (oncoming nurse) by Edu Mcgarry RN (offgoing nurse). Report included the following information SBAR, Intake/Output, MAR, Recent Results, Cardiac Rhythm NSR and Alarm Parameters .

## 2021-08-19 NOTE — PROGRESS NOTES
[facility]    ICU TEAM Progress Note    Name: Jay Rivera   : 1975   MRN: [id number]   Date: 2021      Subjective:   Progress Note: 2021      Mr Eric Barlow is a 52 yo male who presented to the ED on  with no prior PMH with SOB x2 days. He had developed body aches and fever as of . He was diagnosed with COVID-19 pneumonia. He was not vaccinated. He was started on dexamethasone, remdesivir, and tocilizumab. He was admitted to the ICU on 8/15 for worsening hypoxia and required BiPAP. Upon exam this am, he was sitting in the chair, on BiPAP 80%  for several days. Able to get self from chair to bed today. Active Problem List:     Problem List  Never Reviewed        Codes Class    Severe sepsis ([facility] [address] ICD-10-CM: A41.9, R65.20  ICD-9-CM: 038.9, 995.92         Pneumonia due to COVID-19 virus ICD-10-CM: U07.1, J12.82  ICD-9-CM: 480.8, 079.89         ACL tear ICD-10-CM: I65.769H  ICD-9-CM: 844.2               Past Medical History:      has no past medical history of Abuse, Anemia NEC, Calculus of kidney, Congestive heart failure, unspecified, Contact dermatitis and other eczema, due to unspecified cause, Depression, Headache(784.0), Hypercholesterolemia, Psychotic disorder, or Trauma. Past Surgical History:      has a past surgical history that includes hx heent and hx orthopaedic. Home Medications:     Prior to Admission medications    Medication Sig Start Date End Date Taking? Authorizing Provider   azithromycin (ZITHROMAX) 250 mg tablet Take 2 tablets today, then take 1 tablet daily 21   Vitor Smart NP   albuterol sulfate (PROAIR RESPICLICK) 90 mcg/actuation breath activated inhaler Take 2 Puffs by inhalation four (4) times daily as needed for Cough (SOB, wheezing). 21   Vitor Smart NP   benzonatate (Tessalon Perles) 100 mg capsule Take 1 Capsule by mouth three (3) times daily as needed for Cough for up to 7 days.  21  Jun Alonzo Marycruz Villavicencio NP   EPINEPHrine (EPIPEN 2-SOLITARIO) 0.3 mg/0.3 mL (1:1,000) injection 0.3 mL by IntraMUSCular route once as needed for 1 dose. 13   Shirley Rosales MD   diazepam (VALIUM) 5 mg tablet Take 1 Tab by mouth every eight (8) hours as needed (spasm). 12   LIBERTY Velásquez       Allergies/Social/Family History:     No Known Allergies   Social History     Tobacco Use    Smoking status: Former Smoker     Packs/day: 1.00     Years: 10.00     Pack years: 10.00    Smokeless tobacco: Never Used   Substance Use Topics    Alcohol use: Yes     Alcohol/week: 3.3 standard drinks     Types: 4 Cans of beer per week      No family history on file.     Review of Systems:     Unable to provide due to dyspnea    Objective:   Vital Signs:  Visit Vitals  BP (!) 147/97   Pulse 73   Temp 98.6 °F (37 °C)   Resp 17   Ht 5' 10\" (1.778 m)   Wt 109.5 kg (241 lb 8 oz)   SpO2 100%   BMI 34.65 kg/m²    O2 Flow Rate (L/min): 50 l/min O2 Device: BIPAP Temp (24hrs), Av.5 °F (36.9 °C), Min:98.2 °F (36.8 °C), Max:99 °F (37.2 °C)           Intake/Output:     Intake/Output Summary (Last 24 hours) at 2021 0726  Last data filed at 2021 0700  Gross per 24 hour   Intake 520 ml   Output 550 ml   Net -30 ml       Physical Exam:    General:Awake, alert, on BiPAP, moderate dypnea  Eye: PERRLA  Neurologic:  oriented, grossly non-focal  Neck:  Supple, no JVD  Lungs:CTAB, diminished bases, on BiPAP  Heart:  RRR, 2+ pulses, 1+ edema of extremities  Abdomen:  Soft, nontender, nondistended  Skin:c/d/i  Validated     LABS AND  DATA: Personally reviewed  Recent Labs     21  0308 21  0334   WBC 7.9 5.7   HGB 16.2 16.1   HCT 49.2 48.1    210     Recent Labs     21  0308 08/18/21  0334    138   K 4.4 4.2    107   CO2 28 24   BUN 26* 29*   CREA 1.13 1.16   * 110*   CA 8.4* 8.4*   MG 2.5* 2.6*   PHOS 3.5 3.7     Recent Labs     21  0308 21   AP 84 80   TP 6.7 6.8   ALB 3.6 3.5 GLOB 3.1 3.3     No results for input(s): INR, PTP, APTT, INREXT, INREXT in the last 72 hours. No results for input(s): PHI, PCO2I, PO2I, FIO2I in the last 72 hours. No results for input(s): CPK, CKMB, TROIQ, BNPP in the last 72 hours. BiPAP 80% 16/8    MEDS: Reviewed    Chest X-Ray: personally reviewed and report checked    [address] 8/14: Sinus tachycardia    Assessment:     Acute hypoxic respiratory failure secondary to COVID-19 Pneumonia: Admitted to the hospital 8/14. Initial symptoms 8/8. Unvaccinated. He was started on dexamethasone, remdesivir, and tocilizumab. He was admitted to the ICU on 8/15 for worsening hypoxia and required BiPAP. - Holding on diuresis due to NABEEL  - Wean BiPAP as tolerated  - OOB to chair as tolerated for V/Q  - Dexamethasone  - Zinc  - Vitamin C  - Lovenox    NABEEL:    - Holding on diuresis due to NABEEL, slightly improving today  - Daily BMP    Acute deconditioning c/b obesity:  - OOB to chair as tolerated for V/Q and pulmonary offloading   - Able to get self from bed to chair    Multidisciplinary Rounds Completed: Yes    ABCDEF Bundle/Checklist  Pain Medications: None  Target RASS: 0 - Alert & Calm - Spontaneously pays attention to caregiver  Sedation Medications: None  CAM-ICU:  Negative  Mobility: Fair  PT/OT: PT consulted and on board and OT consulted and on board   Restraints: None needed at this time  Discussed Plan of Care (goals of care):  Yes  Addressed Code Status: Full Code    CARDIOVASCULAR  Cardiac Gtts: None  SBP Goal of: > 90 mmHg  MAP Goal of: > 65 mmHg  Transfusion Trigger (Hgb): <7 g/dL    RESPIRATORY  Vent Goals:   Optimize PEEP/Ventilation/Oxygenation  DVT Prophylaxis (if no, list reason): Lovenox   SPO2 Goal: > 92%  Pulmonary toilet: Duo-Nebs     GI/  Brownlee Catheter Present: No  GI Prophylaxis: Protonix (pantoprazole)   Nutrition: Yes Diet  IVFs: None  Bowel Movement: Yes  Bowel Regimen: Docusate (Colace)  Insulin: N    ANTIBIOTICS  Antibiotics:  N    T/L/D  Tubes: None  Lines: None  Drains: None    SPECIAL EQUIPMENT  None    DISPOSITION  Transfer to IMU    CRITICAL CARE CONSULTANT NOTE  I had a face to face encounter with the patient, reviewed and interpreted patient data including clinical events, labs, images, vital signs, I/O's, and examined patient. I have discussed the case and the plan and management of the patient's care with the consulting services, the bedside nurses and the respiratory therapist.      NOTE OF PERSONAL INVOLVEMENT IN CARE   This patient has a high probability of imminent, clinically significant deterioration, which requires the highest level of preparedness to intervene urgently. I participated in the decision-making and personally managed or directed the management of the following life and organ supporting interventions that required my frequent assessment to treat or prevent imminent deterioration. I personally spent 45 minutes of critical care time. This is time spent at this critically ill patient's bedside actively involved in patient care as well as the coordination of care and discussions with the patient's family. This does not include any procedural time which has been billed separately.     Norris Angel NP  [facility]  8/19/2021

## 2021-08-19 NOTE — PROGRESS NOTES
0730: Verbal shift change report given to Willie Marie RN (oncoming nurse) by Adelfo Alan RN (offgoing nurse). Report included the following information SBAR, Kardex, Intake/Output, MAR, Accordion, Recent Results, Med Rec Status, Cardiac Rhythm NSR and Alarm Parameters .

## 2021-08-19 NOTE — PROGRESS NOTES
1930: Bedside and Verbal shift change report given to [address] (oncoming nurse) by Mayo Oneill RN (offgoing nurse). Report included the following information SBAR, Kardex, ED Summary, Intake/Output, MAR, Recent Results, Cardiac Rhythm SR and Alarm Parameters . 0730: Bedside and Verbal shift change report given to [address] (oncoming nurse) by [address] (offgoing nurse). Report included the following information SBAR, Kardex, ED Summary, Intake/Output, MAR, Recent Results, Cardiac Rhythm SR and Alarm Parameters . TRANSFER - OUT REPORT:    Verbal report given to Anne-Marie CARBONE(name) on [place] [employer]  being transferred to [facility]) for routine progression of care       Report consisted of patients Situation, Background, Assessment and   Recommendations(SBAR). Information from the following report(s) SBAR, Kardex, ED Summary, Intake/Output, MAR, Recent Results, Cardiac Rhythm SR and Alarm Parameters  was reviewed with the receiving nurse. Lines:   Peripheral IV 08/14/21 Left Antecubital (Active)   Site Assessment Clean, dry, & intact 08/19/21 0400   Phlebitis Assessment 0 08/19/21 0400   Infiltration Assessment 0 08/19/21 0400   Dressing Status Clean, dry, & intact 08/19/21 0400   Dressing Type Transparent;Tape 08/19/21 0400   Hub Color/Line Status Pink;Capped 08/19/21 0400   Action Taken Open ports on tubing capped 08/19/21 0400   Alcohol Cap Used Yes 08/19/21 0400       Peripheral IV 08/17/21 Posterior;Right Forearm (Active)   Site Assessment Clean, dry, & intact 08/19/21 0400   Phlebitis Assessment 0 08/19/21 0400   Infiltration Assessment 0 08/19/21 0400   Dressing Status Clean, dry, & intact 08/19/21 0400   Dressing Type Transparent;Tape 08/19/21 0400   Hub Color/Line Status Pink;Capped 08/19/21 0400   Action Taken Open ports on tubing capped 08/19/21 0400   Alcohol Cap Used Yes 08/19/21 0400        Opportunity for questions and clarification was provided.       Patient transported with:   Monitor  O2 @ bipap liters  Registered Nurse  [facility]

## 2021-08-19 NOTE — PROGRESS NOTES
Pt placed on HFNC per pt request for pt to eat. Pt unable to tolerate HFNC. Pt O2 sat dropped to 70s. pt placed back on bipap. O2 sat are now 90-92%. Will notify MD to change diet order to NPO for now.

## 2021-08-20 NOTE — PROGRESS NOTES
0030: Bedside and Verbal shift change report given to Marilee Falk RN (oncoming nurse) by Diogo Chan (offgoing nurse). Report included the following information SBAR and Cardiac Rhythm NSR.     0500: Multiple attempts for remainder of labs - unable to obtain. Pt requested to wait to attempt to draw labs until later the morning    0800: Bedside and Verbal shift change report given to ICU RN (oncoming nurse) by Marli Johnson (offgoing nurse). Report included the following information SBAR.

## 2021-08-20 NOTE — PROGRESS NOTES
[facility]  Hospitalist Group     ICU Transfer/Accept Summary     [facility]  DATE OF TRANSFER: 8/19/2021       PATIENT ID: Little Richard  MRN: [id number]   YOB: 1975    PRIMARY CARE PROVIDER: None   DATE OF ADMISSION: 8/14/2021  8:47 AM    ATTENDING PHYSICIAN: Subha Henriquez MD  CONSULTATIONS:   IP CONSULT TO PULMONOLOGY  IP CONSULT TO INTENSIVIST    PROCEDURES/SURGERIES:   * No surgery found *    REASON FOR ADMISSION: <principal problem not specified>     HOSPITAL PROBLEM LIST:  Patient Active Problem List   Diagnosis Code    ACL tear S83.519A    Severe sepsis (HCC) A41.9, R65.20    Pneumonia due to COVID-19 virus U07.1, J12.82         Brief HPI and Hospital Course:    Chief complain:  Worsening SOB      History of present illness on admission 8/14/21:  Little Richard is a 51 y.o. male who presents with NO PMHx presents to hospital with SOB that has been worsening over past 2 days. Patient states that his whole family has COVID, starting with his daughter. Patient states that he started to have body aches, fever, since last Sunday on 8/8. Pt was seen in ER on 8/12 at which time he had pneumonia but presume he was not hypoxic and sent home on Prednisone and Azithromycin. Patient states after going home he has progressively worsened. Patient now having worsening cough which is dry and SOB. Patient states he has been having fever of 102 at home. Patient is NOT vaccinated against COVID-19. Patient states he and his son were planning to get COVID Vaccine soon. No n/v diarrhea, abdominal pain,dysuria, weakness, numbness. Reason for ICU Admission on 8/15/21: COVID 19 PNA - Progressively getting worse      HPI on ICU admission:  (from H/P as patient is unable to provide history as he is tachypnic)   Alyssia Sheikh Heidy is a 51 y. o. male who presents with NO PMHx presents to hospital with SOB that has been worsening over past 2 days.  Patient states that his whole family has COVID, starting with his daughter. Patient states that he started to have body aches, fever, since last Sunday on 8/8. Pt was seen in ER on 8/12 at which time he had pneumonia but presume he was not hypoxic and sent home on Prednisone and Azithromycin. Patient states after going home he has progressively worsened. Patient now having worsening cough which is dry and SOB. Patient states he has been having fever of 102 at home. Patient is NOT vaccinated against COVID-19. Patient states he and his son were planning to get COVID Vaccine soon. No n/v diarrhea, abdominal pain,dysuria, weakness, numbness.     ICU consulted for increasing oxygen requirements. Subjective:   Progress Note: 8/19/2021  from ICU team     Mr Espinoza King is a 52 yo male who presented to the ED on 8/14 with no prior PMH with SOB x2 days. He had developed body aches and fever as of 8/8. He was diagnosed with COVID-19 pneumonia. He was not vaccinated. He was started on dexamethasone, remdesivir, and tocilizumab. He was admitted to the ICU on 8/15 for worsening hypoxia and required BiPAP. Upon exam this am, he was sitting in the chair, on BiPAP 80% 16/8 for several days. Able to get self from chair to bed today. Subjective- Hospitalist team on ICU transfer out 08/19/21   Follow up SOB, Hypoxia, COVID-19 PNA. Patient seen and examined at the bedside. Labs, images and notes reviewed  Discussed with nursing staff, orders reviewed. Plan discussed with patient/Family  Feeling OK. Not much improved. Desatting easy even on BiPAP with coughing. Could not take of mask much for meals. SOB aggravated with coughing spells. No fever, chills. No other concerns. Continue to remain on BiPAP at 70% FiO2 with 16/8 setting.       Assessment and Plan:  #Severe COVID-19 pneumonia  #Acute hypoxic respiratory failure, on continuous BiPAP, without much improvement  #Severe coughing spells with worsened SOB intermittently  #NABEEL, baseline creatinine around 0.70.8. Creatinine increased up to 1.16, with >0.3 rise of creatinine from baseline. Slight better at 1.13  #Obesity, BMI 34.65  #Acute deconditioning due to COVID-19    -Transfer to ICU on 8/15/2021. Transfer out of ICU on 8/19/2021 to IM  -Continue BiPAP support as patient is not able to tolerate taking it off even for short period of time  -S/p Actemra 8/14  -S/p remdesivir 8/148/18  -On Decadron IV. Change to Solu-Medrol 125 mg IV x1 now and subsequently 40 mg IV every 6 hours, due to persistent need of BiPAP/NIV support  -CBC, CMP, CRP, procalcitonin, D-dimer monitoring  -Effort to wean from NIVBiPAP to high flow nasal cannula as tolerated, likely starting tomorrow 8/20  -Continue supportive care: vitamin C, Tessalon Perles as needed, zinc, add Mucinex scheduled, as needed albuterol inhaler, as needed antitussive  -Consider CXR in morning  -Consider diuresis as tolerated with caution given NABEEL  -Consider pulmonology consultation  -Lovenox per COVID-19 DVT prophylaxis protocol    CODE STATUS: Full code  DVT prophylaxis: Lovenox SQ    CRITICAL CARE ATTESTATION:  I had a face to face encounter with the patient, reviewed and interpreted patient data including clinical events, labs, images, vital signs, I/O's, and examined patient. I have discussed the case and the plan and management of the patient's care with the consulting services, the bedside nurses and necessary ancillary providers. NOTE OF PERSONAL INVOLVEMENT IN CARE   This patient has a high probability of imminent, clinically significant deterioration, which requires the highest level of preparedness to intervene urgently. I participated in the decision-making and personally managed or directed the management of the following life and organ supporting interventions that required my frequent assessment to treat or prevent imminent deterioration. I personally spent 45 minutes of critical care time.   This is time spent at this critically ill patient's bedside actively involved in patient care as well as the coordination of care and discussions with the patient's family. This does not include any procedural time which has been billed separately. PHYSICAL EXAMINATION:  Visit Vitals  /85   Pulse 97   Temp 98.4 °F (36.9 °C)   Resp 27   Ht 5' 10\" (1.778 m)   Wt 109.5 kg (241 lb 8 oz)   SpO2 95%   BMI 34.65 kg/m²       General:          Alert, sitting up in chair, on BiPAP, moderate respiratory distress intermittently with coughing spells along with hypoxia even on BiPAP with sats dropping in mid 70s  HEENT:           Atraumatic, MMMlimited exam, PERRLA. BiPAP mask in place           Neck:               Supple, symmetrical,  thyroid: non tender  Lungs:              Good AE on BiPAP, but reduced in posterior basal bilaterally. No wheezing or crackles  Heart:              Regular  rhythm,   intermittent tachycardia with heart rate in 100s but otherwise controlled in 80s. No  murmur   No edema  Abdomen:       Soft, non-tender. Not distended. Bowel sounds normal  Extremities:     No cyanosis. No clubbing,  +2 distal pulses  Skin:                Not pale. Not Jaundiced  No rashes   Psych:             Not anxious or agitated. Neurologic:      Alert, moves all extremities, oriented X 3. Labs:     Recent Labs     08/19/21 0308 08/18/21 0334   WBC 7.9 5.7   HGB 16.2 16.1   HCT 49.2 48.1    210     Recent Labs     08/19/21 0308 08/18/21 0334 08/17/21  0304    138 139   K 4.4 4.2 3.6    107 111*   CO2 28 24 21   BUN 26* 29* 27*   CREA 1.13 1.16 0.94   * 110* 121*   CA 8.4* 8.4* 7.4*   MG 2.5* 2.6* 2.3   PHOS 3.5 3.7 3.2     Recent Labs     08/19/21 0308 08/18/21 0334 08/17/21  0304   * 133* 109*   AP 84 80 68   TBILI 1.0 0.8 0.6   TP 6.7 6.8 5.8*   ALB 3.6 3.5 2.9*   GLOB 3.1 3.3 2.9     No results for input(s): INR, PTP, APTT, INREXT in the last 72 hours.    No results for input(s): FE, TIBC, PSAT, FERR in the last 72 hours. No results found for: FOL, RBCF   No results for input(s): PH, PCO2, PO2 in the last 72 hours. No results for input(s): CPK, CKNDX, TROIQ in the last 72 hours. No lab exists for component: CPKMB  No results found for: CHOL, CHOLX, CHLST, CHOLV, HDL, HDLP, LDL, LDLC, DLDLP, TGLX, TRIGL, TRIGP, CHHD, CHHDX  No results found for: GLUCPOC  No results found for: COLOR, APPRN, SPGRU, REFSG, YORDAN, PROTU, GLUCU, KETU, BILU, UROU, REECE, LEUKU, GLUKE, EPSU, [place], WBCU, [place] Ruben    Radiology:  CTA CHEST W OR W WO CONT    Result Date: 8/14/2021  1. No evidence of pulmonary embolus. 2.  Moderate to severe COVID 19 pneumonia.        CODE STATUS:  x Full Code    DNR    Partial    Comfort Care       Signed:   Fabian Higgins MD  Date of Service:  8/19/2021  8:57 PM

## 2021-08-20 NOTE — PROGRESS NOTES
NEW:  Anticipate pt will discharge home pending medical stability. Pt remains on bipap 8/20. RUR 16%    Pt is covid positive. Noted transfer out of ICU to [facility]. Pt remains on bipap for covid oxygen support. ER Contact: Girlfriend Renate Wise [phone], Sister: Nuzhat Ordoñez [phone]    Pt is normally independent and lives with his significant other, Clint Yun and children. Pt doesn't have a PCP, may benefit from [facility] follow up once stable for discharge to bridge establishing with a new PCP.     Madelyn Servin MSW

## 2021-08-20 NOTE — PROGRESS NOTES
08/20/21 1308   Oxygen Therapy   O2 Sat (%) 93 %   Pulse via Oximetry 97 beats per minute   O2 Device BIPAP   FIO2 (%) 80 %   Pre-Treatment   Breathing Pattern Tachypneic   Respirations 28       1235: Patient placed on HFNC 60/100%. Patient with a strong dry hacking cough. 02 sats dropped to as low as 72. Placed back on Bipap at this time. Will continue to monitor.

## 2021-08-20 NOTE — PROGRESS NOTES
0800: Verbal shift change report given to Edward Hwang RN (oncoming nurse) by Ean Spear RN (offgoing nurse). Report included the following information SBAR, Kardex, Intake/Output, MAR, Accordion, Recent Results, Med Rec Status, Cardiac Rhythm NSR and Alarm Parameters .

## 2021-08-20 NOTE — CONSULTS
Pulmonary, Critical Care, and Sleep Medicine    Initial Patient Consult    Name: Bonnie Lopez MRN: [id number]   : 1975 Hospital: [place] [address]   Date: 2021        IMPRESSION:   · Acute hypoxic respiratory failure  · COVID-19 pneumonia, he has received remdesivir and Tocilizumab, steroids are ongoing  · ARDS  · NABEEL      RECOMMENDATIONS:   · O2 - currently on BiPAP  · On steroids  · Monitor creatinine  · Diuretics as needed  · Monitor procalcitonin and need for antibiotic coverage as indicated  · At risk for post ARDS fibrotic disease  · DVT prophylaxis  · High risk for further decompensation-continue close monitoring     Subjective:     Patient was evaluated earlier today. He was sitting in a chair on BiPAP, uncomfortable with incessant coughing. He was transferred out of the ICU and has completed initial courses with remdesivir as well as Tocilizumab. He has received diuretic today. Chest x-ray shows adequately inflated lungs with findings of ARDS. He is at risk for postinflammatory fibrosis.   This patient has been seen and evaluated at the request of Dr. Fer Garza for \"Actemra\". Patient is a 51 y.o. unvaccinated male with no significant PMH who was diagnosed with COVID on 21. He has had progressively worsening dyspnea since that time and now is hypoxic, requiring HFNC to maintain SpO2 > 90%. He is currently on 30 LPM and remains very short of breath. Feels \"hot\". No past medical history on file. Past Surgical History:   Procedure Laterality Date    HX HEENT      ORAL SURGERY    HX ORTHOPAEDIC      left knee      Prior to Admission medications    Medication Sig Start Date End Date Taking?  Authorizing Provider   azithromycin (ZITHROMAX) 250 mg tablet Take 2 tablets today, then take 1 tablet daily 21   Joanna Mera NP   albuterol sulfate (PROAIR RESPICLICK) 90 mcg/actuation breath activated inhaler Take 2 Puffs by inhalation four (4) times daily as needed for Cough (SOB, wheezing). 8/12/21   Orquidea Vaca NP   benzonatate (Tessalon Perles) 100 mg capsule Take 1 Capsule by mouth three (3) times daily as needed for Cough for up to 7 days. 8/12/21 8/19/21  Orquidea Vaca NP   EPINEPHrine (EPIPEN 2-SOLITARIO) 0.3 mg/0.3 mL (1:1,000) injection 0.3 mL by IntraMUSCular route once as needed for 1 dose. 8/29/13   Shirley Rosales MD   diazepam (VALIUM) 5 mg tablet Take 1 Tab by mouth every eight (8) hours as needed (spasm). 11/13/12   LIBERTY Velásquez     No Known Allergies   Social History     Tobacco Use    Smoking status: Former Smoker     Packs/day: 1.00     Years: 10.00     Pack years: 10.00    Smokeless tobacco: Never Used   Substance Use Topics    Alcohol use: Yes     Alcohol/week: 3.3 standard drinks     Types: 4 Cans of beer per week      No family history on file. Current Facility-Administered Medications   Medication Dose Route Frequency    methylPREDNISolone (PF) (SOLU-MEDROL) injection 40 mg  40 mg IntraVENous Q6H    guaiFENesin ER (MUCINEX) tablet 1,200 mg  1,200 mg Oral BID    benzonatate (TESSALON) capsule 200 mg  200 mg Oral TID    zinc sulfate (ZINCATE) 50 mg zinc (220 mg) capsule 1 Capsule  1 Capsule Oral DAILY    senna (SENOKOT) tablet 17.2 mg  2 Tablet Oral DAILY    docusate sodium (COLACE) capsule 100 mg  100 mg Oral DAILY    0.9% sodium chloride infusion  10 mL/hr IntraVENous CONTINUOUS    sodium chloride (NS) flush 5-40 mL  5-40 mL IntraVENous Q8H    cholecalciferol (VITAMIN D3) (1000 Units /25 mcg) tablet 2,000 Units  2,000 Units Oral DAILY    ascorbic acid (vitamin C) (VITAMIN C) tablet 500 mg  500 mg Oral BID    enoxaparin (LOVENOX) injection 40 mg  40 mg SubCUTAneous Q12H       Review of Systems:  A comprehensive review of systems was negative except for that written in the HPI.     Objective:   Vital Signs:    Visit Vitals  /74   Pulse 90   Temp 97.6 °F (36.4 °C)   Resp 29   Ht 5' 10\" (1.778 m)   Wt 106.3 kg (234 lb 6.4 oz)   SpO2 100%   BMI 33.63 kg/m²       O2 Device: BIPAP   O2 Flow Rate (L/min): 45 l/min   Temp (24hrs), Av.1 °F (36.7 °C), Min:97.6 °F (36.4 °C), Max:98.4 °F (36.9 °C)       Intake/Output:   Last shift:      No intake/output data recorded. Last 3 shifts:  1901 -  0700  In: 130 [I.V.:130]  Out: 250 [Urine:250]  No intake or output data in the 24 hours ending 21 1048   Physical Exam:   General:  Alert, ill appearing, increased WOB   Head:  Normocephalic, without obvious abnormality, atraumatic. Eyes:  Conjunctivae/corneas clear. Nose: Nares normal. Septum midline. Mucosa normal.    Throat: Lips, mucosa, and tongue normal.     Neck: Supple, symmetrical, trachea midline    Back:   Symmetric, no curvature. ROM normal.   Lungs: Tachypneic, distant bilateral breath sounds   Chest wall:  No tenderness or deformity. Heart:  Regular rate and rhythm    Abdomen:   Soft, non-tender. Bowel sounds normal.     Extremities: Extremities normal, atraumatic, no cyanosis or edema. Skin: Skin color, texture, turgor normal. No rashes or lesions   Neurologic: Grossly nonfocal     Data review:     Recent Results (from the past 24 hour(s))   CBC W/O DIFF    Collection Time: 21  3:36 AM   Result Value Ref Range    WBC 16.3 (H) 4.1 - 11.1 K/uL    RBC 5.80 (H) 4.10 - 5.70 M/uL    HGB 16.8 12.1 - 17.0 g/dL    HCT 51.0 (H) 36.6 - 50.3 %    MCV 87.9 80.0 - 99.0 FL    MCH 29.0 26.0 - 34.0 PG    MCHC 32.9 30.0 - 36.5 g/dL    RDW 12.7 11.5 - 14.5 %    PLATELET 623 263 - 201 K/uL    MPV 10.9 8.9 - 12.9 FL    NRBC 0.0 0  WBC    ABSOLUTE NRBC 0.00 0.00 - 0.01 K/uL   PROCALCITONIN    Collection Time: 21  3:36 AM   Result Value Ref Range    Procalcitonin 0.26 ng/mL       Imaging:  I have personally reviewed the patients radiographs and have reviewed the reports:  Diffuse bilateral infiltrates. Patient is at high risk for clinical decline and persistent morbidity.   Critical care time spent 35 minutes     Ludmila Davis MD

## 2021-08-20 NOTE — PROGRESS NOTES
Bedside shift change report given to Melissa (oncoming nurse) by Lisbet Estrada RN (offgoing nurse). Report included the following information SBAR, Kardex, ED Summary, Intake/Output, MAR, Recent Results, Med Rec Status and Cardiac Rhythm NSR/ST.

## 2021-08-20 NOTE — PROGRESS NOTES
[facility] Adult  Hospitalist Group        DATE OF TRANSFER: 8/20/2021       PATIENT ID: Katherin Morrow  MRN: [id number]   YOB: 1975    PRIMARY CARE PROVIDER: None   DATE OF ADMISSION: 8/14/2021  8:47 AM    ATTENDING PHYSICIAN: Sharon Lares MD  CONSULTATIONS:   IP CONSULT TO PULMONOLOGY  IP CONSULT TO PULMONOLOGY  IP CONSULT TO INTENSIVIST    PROCEDURES/SURGERIES:   * No surgery found *    REASON FOR ADMISSION: <principal problem not specified>     HOSPITAL PROBLEM LIST:  Patient Active Problem List   Diagnosis Code    ACL tear S83.519A    Severe sepsis ([facility] [address] A41.9, R65.20    Pneumonia due to COVID-19 virus U07.1, J12.82         Brief HPI and Hospital Course:    Chief complain:  Worsening SOB      History of present illness on admission 8/14/21:  Katherin Morrow is a 51 y.o. male who presents with NO PMHx presents to hospital with SOB that has been worsening over past 2 days. Patient states that his whole family has COVID, starting with his daughter. Patient states that he started to have body aches, fever, since last Sunday on 8/8. Pt was seen in ER on 8/12 at which time he had pneumonia but presume he was not hypoxic and sent home on Prednisone and Azithromycin. Patient states after going home he has progressively worsened. Patient now having worsening cough which is dry and SOB. Patient states he has been having fever of 102 at home. Patient is NOT vaccinated against COVID-19. Patient states he and his son were planning to get COVID Vaccine soon. No n/v diarrhea, abdominal pain,dysuria, weakness, numbness. Reason for ICU Admission on 8/15/21: COVID 19 PNA - Progressively getting worse      HPI on ICU admission:  (from H/P as patient is unable to provide history as he is tachypnic)   Monique Mead Heidy is a 51 y. o. male who presents with NO PMHx presents to hospital with SOB that has been worsening over past 2 days.  Patient states that his whole family has COVID, starting with his daughter. Patient states that he started to have body aches, fever, since last Sunday on 8/8. Pt was seen in ER on 8/12 at which time he had pneumonia but presume he was not hypoxic and sent home on Prednisone and Azithromycin. Patient states after going home he has progressively worsened. Patient now having worsening cough which is dry and SOB. Patient states he has been having fever of 102 at home. Patient is NOT vaccinated against COVID-19. Patient states he and his son were planning to get COVID Vaccine soon. No n/v diarrhea, abdominal pain,dysuria, weakness, numbness.     ICU consulted for increasing oxygen requirements. Subjective:   Progress Note: 8/19/2021  from ICU team     Mr Eric Barlow is a 52 yo male who presented to the ED on 8/14 with no prior PMH with SOB x2 days. He had developed body aches and fever as of 8/8. He was diagnosed with COVID-19 pneumonia. He was not vaccinated. He was started on dexamethasone, remdesivir, and tocilizumab. He was admitted to the ICU on 8/15 for worsening hypoxia and required BiPAP. Upon exam this am, he was sitting in the chair, on BiPAP 80% 16/8 for several days. Able to get self from chair to bed today. Subjective- Hospitalist team on ICU transfer out 08/20/21   Follow up SOB, Hypoxia, COVID-19 PNA. Patient seen and examined at the bedside. Labs, images and notes reviewed  Discussed with nursing staff, orders reviewed. Plan discussed with patient/Family    He was sitting in the chair,on BiPAP, asked when he can go home. Explained to th patient he is still requiring significanct resp support   Continues to have dry cough    Discussed about trying high flow today.    Noted again in the afternoon,he was placed back on  BIPAP as he desaturated while on high flow          Assessment and Plan:  #Severe COVID-19 pneumonia  #Acute hypoxic respiratory failure, on continuous BiPAP, without much improvement  #Severe coughing spells with worsened SOB intermittently  #NABEEL-improving  #Obesity, BMI 34.65  #Acute deconditioning due to COVID-19    -Transfer to ICU on 8/15/2021. Transfer out of ICU on 8/19/2021 to IMCU  -Continue BiPAP support as patient is not able to tolerate taking it off even for short period of time even with high flow  -S/p Actemra 8/14  -S/p remdesivir 8/148/19  -on solumedrol   -CBC, CMP, CRP, procalcitonin, D-dimer monitoring  Sputum cultures     -Continue supportive care: vitamin C, Tessalon Perles as needed, zinc, add Mucinex scheduled, as needed albuterol inhaler, as needed antitussive  -repeat CXR today suggests worsening infiltrates  -one dose of 20 mg lasix today  -Consider diuresis as tolerated with caution given NABEEL  - pulmonology consultated  -Lovenox per COVID-19 DVT prophylaxis protocol    CODE STATUS: Full code  DVT prophylaxis: Lovenox SQ    CRITICAL CARE ATTESTATION:  I had a face to face encounter with the patient, reviewed and interpreted patient data including clinical events, labs, images, vital signs, I/O's, and examined patient. I have discussed the case and the plan and management of the patient's care with the consulting services, the bedside nurses and necessary ancillary providers. NOTE OF PERSONAL INVOLVEMENT IN CARE   This patient has a high probability of imminent, clinically significant deterioration, which requires the highest level of preparedness to intervene urgently. I participated in the decision-making and personally managed or directed the management of the following life and organ supporting interventions that required my frequent assessment to treat or prevent imminent deterioration. I personally spent 40 minutes of critical care time. This is time spent at this critically ill patient's bedside actively involved in patient care as well as the coordination of care and discussions with the patient's family. This does not include any procedural time which has been billed separately. PHYSICAL EXAMINATION:  Visit Vitals  BP (!) 128/111 (BP 1 Location: Left upper arm, BP Patient Position: At rest)   Pulse (!) 103   Temp 98.8 °F (37.1 °C)   Resp 25   Ht 5' 10\" (1.778 m)   Wt 106.3 kg (234 lb 6.4 oz)   SpO2 93%   BMI 33.63 kg/m²       General:          Alert, sitting up in chair, on BiPAP, moderate respiratory distress intermittently with coughing spells   HEENT:           Atraumatic, MMMlimited exam, PERRLA. BiPAP mask in place           Neck:               Supple, symmetrical,  thyroid: non tender  Lungs:              decreased breath sounds , No wheezing or crackles  Heart:              Regular  rhythm,   normal rate,  No  murmur   No edema  Abdomen:       Soft, non-tender. Not distended. Bowel sounds normal  Extremities:     1+ LE edema  Skin:                Not pale. Not Jaundiced  No rashes   Psych:             Not anxious or agitated. Neurologic:      Alert, moves all extremities, oriented X 3. Labs:     Recent Labs     08/20/21  0336 08/19/21  0308   WBC 16.3* 7.9   HGB 16.8 16.2   HCT 51.0* 49.2    223     Recent Labs     08/19/21  0308 08/18/21  0334    138   K 4.4 4.2    107   CO2 28 24   BUN 26* 29*   CREA 1.13 1.16   * 110*   CA 8.4* 8.4*   MG 2.5* 2.6*   PHOS 3.5 3.7     Recent Labs     08/19/21  0308 08/18/21  0334   * 133*   AP 84 80   TBILI 1.0 0.8   TP 6.7 6.8   ALB 3.6 3.5   GLOB 3.1 3.3     No results for input(s): INR, PTP, APTT, INREXT, INREXT in the last 72 hours. No results for input(s): FE, TIBC, PSAT, FERR in the last 72 hours. No results found for: FOL, RBCF   No results for input(s): PH, PCO2, PO2 in the last 72 hours. No results for input(s): CPK, CKNDX, TROIQ in the last 72 hours.     No lab exists for component: CPKMB  No results found for: CHOL, CHOLX, CHLST, CHOLV, HDL, HDLP, LDL, LDLC, DLDLP, TGLX, TRIGL, TRIGP, CHHD, CHHDX  No results found for: GLUCPOC  No results found for: COLOR, APPRN, SPGRU, REFSG, YORDAN, Brodie Chang, [facility], [facility], Maureen Magana    Radiology:  CTA CHEST W OR W WO CONT    Result Date: 8/14/2021  1. No evidence of pulmonary embolus. 2.  Moderate to severe COVID 19 pneumonia. XR CHEST PORT    Result Date: 8/20/2021  1. COVID-19 pneumonia. 2. Significantly increased interstitial and airspace disease may represent worsening COVID-19 pneumonia and/or superimposed pulmonary edema.       CODE STATUS:  x Full Code    DNR    Partial    Comfort Care       Signed:   Thee Joyner MD  Date of Service:  8/20/2021  8:57 PM

## 2021-08-21 NOTE — PROGRESS NOTES
Bedside shift change report given to TONA Gu (oncoming nurse) by Mac Rodriguez RN (offgoing nurse). Report included the following information SBAR, Kardex, Intake/Output, MAR, Recent Results, Cardiac Rhythm NSR/ST and Dual Neuro Assessment. I have read and agree with the documentation provided by Angélica Feliz RN as her preceptor.

## 2021-08-21 NOTE — PROGRESS NOTES
Problem: Risk for Spread of Infection  Goal: Prevent transmission of infectious organism to others  Description: Prevent the transmission of infectious organisms to other patients, staff members, and visitors. Outcome: Progressing Towards Goal     Problem: Breathing Pattern - Ineffective  Goal: *Absence of hypoxia  Outcome: Progressing Towards Goal  Goal: *Use of effective breathing techniques  Outcome: Progressing Towards Goal     Problem: Airway Clearance - Ineffective  Goal: Achieve or maintain patent airway  Outcome: Progressing Towards Goal     Problem: Gas Exchange - Impaired  Goal: Absence of hypoxia  Outcome: Progressing Towards Goal  Goal: Promote optimal lung function  Outcome: Progressing Towards Goal     Problem: Breathing Pattern - Ineffective  Goal: Ability to achieve and maintain a regular respiratory rate  Outcome: Progressing Towards Goal     Problem:  Body Temperature -  Risk of, Imbalanced  Goal: Ability to maintain a body temperature within defined limits  Outcome: Progressing Towards Goal  Goal: Will regain or maintain usual level of consciousness  Outcome: Progressing Towards Goal  Goal: Complications related to the disease process, condition or treatment will be avoided or minimized  Outcome: Progressing Towards Goal     Problem: Isolation Precautions - Risk of Spread of Infection  Goal: Prevent transmission of infectious organism to others  Outcome: Progressing Towards Goal     Problem: Nutrition Deficits  Goal: Optimize nutrtional status  Outcome: Progressing Towards Goal     Problem: Risk for Fluid Volume Deficit  Goal: Maintain normal heart rhythm  Outcome: Progressing Towards Goal  Goal: Maintain absence of muscle cramping  Outcome: Progressing Towards Goal  Goal: Maintain normal serum potassium, sodium, calcium, phosphorus, and pH  Outcome: Progressing Towards Goal     Problem: Loneliness or Risk for Loneliness  Goal: Demonstrate positive use of time alone when socialization is not possible  Outcome: Progressing Towards Goal     Problem: Fatigue  Goal: Verbalize increase energy and improved vitality  Outcome: Progressing Towards Goal     Problem: Falls - Risk of  Goal: *Absence of Falls  Description: Document Shalini Harmon Fall Risk and appropriate interventions in the flowsheet.   Outcome: Progressing Towards Goal  Note: Fall Risk Interventions:            Medication Interventions: Teach patient to arise slowly    Elimination Interventions: Bed/chair exit alarm, Call light in reach, Toilet paper/wipes in reach, Urinal in reach, Patient to call for help with toileting needs

## 2021-08-21 NOTE — PROGRESS NOTES
[facility] Adult  Hospitalist Group        DATE OF TRANSFER: 8/21/2021       PATIENT ID: Jorge Santamaria  MRN: [id number]   YOB: 1975    PRIMARY CARE PROVIDER: None   DATE OF ADMISSION: 8/14/2021  8:47 AM    ATTENDING PHYSICIAN: Noreen Florian MD  CONSULTATIONS:   IP CONSULT TO PULMONOLOGY  IP CONSULT TO PULMONOLOGY  IP CONSULT TO INTENSIVIST    PROCEDURES/SURGERIES:   * No surgery found *    REASON FOR ADMISSION: <principal problem not specified>     HOSPITAL PROBLEM LIST:  Patient Active Problem List   Diagnosis Code    ACL tear S83.519A    Severe sepsis ([facility] [address] A41.9, R65.20    Pneumonia due to COVID-19 virus U07.1, J12.82         Brief HPI and Hospital Course:    Chief complain:  Worsening SOB      History of present illness on admission 8/14/21:  Jorge Santamaria is a 51 y.o. male who presents with NO PMHx presents to hospital with SOB that has been worsening over past 2 days. Patient states that his whole family has COVID, starting with his daughter. Patient states that he started to have body aches, fever, since last Sunday on 8/8. Pt was seen in ER on 8/12 at which time he had pneumonia but presume he was not hypoxic and sent home on Prednisone and Azithromycin. Patient states after going home he has progressively worsened. Patient now having worsening cough which is dry and SOB. Patient states he has been having fever of 102 at home. Patient is NOT vaccinated against COVID-19. Patient states he and his son were planning to get COVID Vaccine soon. No n/v diarrhea, abdominal pain,dysuria, weakness, numbness. Reason for ICU Admission on 8/15/21: COVID 19 PNA - Progressively getting worse      HPI on ICU admission:  (from H/P as patient is unable to provide history as he is tachypnic)   Jennifer Moody is a 51 y. o. male who presents with NO PMHx presents to hospital with SOB that has been worsening over past 2 days.  Patient states that his whole family has COVID, starting with his daughter. Patient states that he started to have body aches, fever, since last Sunday on 8/8. Pt was seen in ER on 8/12 at which time he had pneumonia but presume he was not hypoxic and sent home on Prednisone and Azithromycin. Patient states after going home he has progressively worsened. Patient now having worsening cough which is dry and SOB. Patient states he has been having fever of 102 at home. Patient is NOT vaccinated against COVID-19. Patient states he and his son were planning to get COVID Vaccine soon. No n/v diarrhea, abdominal pain,dysuria, weakness, numbness.     ICU consulted for increasing oxygen requirements. Subjective:   Progress Note: 8/19/2021  from ICU team     Mr Shu Best is a 50 yo male who presented to the ED on 8/14 with no prior PMH with SOB x2 days. He had developed body aches and fever as of 8/8. He was diagnosed with COVID-19 pneumonia. He was not vaccinated. He was started on dexamethasone, remdesivir, and tocilizumab. He was admitted to the ICU on 8/15 for worsening hypoxia and required BiPAP. Upon exam this am, he was sitting in the chair, on BiPAP 80% 16/8 for several days. Able to get self from chair to bed today. Subjective- Hospitalist team on ICU transfer out 08/21/21   Follow up SOB, Hypoxia, COVID-19 PNA. Patient seen and examined at the bedside. Labs, images and notes reviewed  Discussed with nursing staff, orders reviewed. Plan discussed with patient/Family      Discussed with RN,tried off BiPAP on high flow 60 lt to see if he can eat- he started coughing and desaturating. Placed back on BIPAP. Sitting in the chair,complaints of cough        Assessment and Plan:  #Severe COVID-19 pneumonia  #Acute hypoxic respiratory failure, on continuous BiPAP, without much improvement  #Severe coughing spells with worsened SOB intermittently  #NABEEL-improving  #Obesity, BMI 34.65  #Acute deconditioning due to COVID-19    -Transfer to ICU on 8/15/2021.   Transfer out of ICU on 8/19/2021 to IMCU  -Continue BiPAP support as patient is not able to tolerate taking it off even for short period of time even with high flow  -S/p Actemra 8/14  -S/p remdesivir 8/148/19  -on solumedrol   -CBC, CMP, CRP, procalcitonin, D-dimer monitoring  Sputum cultures -unable to obtain    -Continue supportive care: vitamin C, Tessalon Perles as needed, zinc,  Mucinex scheduled, as needed albuterol inhaler, as needed antitussive  -repeat CXR again suggests worsening infiltrates  -one dose of 20 mg lasix today  -Consider diuresis as tolerated with caution given NABEEL  - pulmonology consultated  -Lovenox per COVID-19 DVT prophylaxis protocol    CODE STATUS: Full code  DVT prophylaxis: Lovenox SQ    CRITICAL CARE ATTESTATION:  I had a face to face encounter with the patient, reviewed and interpreted patient data including clinical events, labs, images, vital signs, I/O's, and examined patient. I have discussed the case and the plan and management of the patient's care with the consulting services, the bedside nurses and necessary ancillary providers. NOTE OF PERSONAL INVOLVEMENT IN CARE   This patient has a high probability of imminent, clinically significant deterioration, which requires the highest level of preparedness to intervene urgently. I participated in the decision-making and personally managed or directed the management of the following life and organ supporting interventions that required my frequent assessment to treat or prevent imminent deterioration. I personally spent 38 minutes of critical care time. This is time spent at this critically ill patient's bedside actively involved in patient care as well as the coordination of care and discussions with the patient's family. This does not include any procedural time which has been billed separately.              PHYSICAL EXAMINATION:  Visit Vitals  /70 (BP 1 Location: Right upper arm, BP Patient Position: At rest;Sitting)   Pulse 73 Temp 98.5 °F (36.9 °C)   Resp 26   Ht 5' 10\" (1.778 m)   Wt 106.1 kg (234 lb)   SpO2 96%   BMI 33.58 kg/m²       General:          Alert, sitting up in chair, on BiPAP, respiratory distress intermittently with coughing spells   HEENT:           Atraumatic, MMMlimited exam, PERRLA. BiPAP mask in place           Neck:               Supple, symmetrical,  thyroid: non tender  Lungs:              decreased breath sounds , No wheezing or crackles, rhonchi+  Heart:              Regular  rhythm,   normal rate,  No  murmur   No edema  Abdomen:       Soft, non-tender. Not distended. Extremities:     1+ LE edema  Skin:                Not pale. Not Jaundiced  No rashes   Psych:             Not anxious or agitated. Neurologic:      Alert, moves all extremities, oriented X 3. Labs:     Recent Labs     08/20/21  0336 08/19/21  0308   WBC 16.3* 7.9   HGB 16.8 16.2   HCT 51.0* 49.2    223     Recent Labs     08/21/21  0339 08/20/21  1748 08/19/21  0308   NA  --   --  137   K  --   --  4.4   CL  --   --  106   CO2  --   --  28   BUN  --   --  26*   CREA  --   --  1.13   GLU  --   --  106*   CA  --   --  8.4*   MG 2.5* 2.6* 2.5*   PHOS 3.3 2.3* 3.5     Recent Labs     08/19/21  0308   *   AP 84   TBILI 1.0   TP 6.7   ALB 3.6   GLOB 3.1     No results for input(s): INR, PTP, APTT, INREXT, INREXT in the last 72 hours. No results for input(s): FE, TIBC, PSAT, FERR in the last 72 hours. No results found for: FOL, RBCF   No results for input(s): PH, PCO2, PO2 in the last 72 hours. No results for input(s): CPK, CKNDX, TROIQ in the last 72 hours.     No lab exists for component: CPKMB  No results found for: CHOL, CHOLX, CHLST, CHOLV, HDL, HDLP, LDL, LDLC, DLDLP, TGLX, TRIGL, TRIGP, CHHD, CHHDX  Lab Results   Component Value Date/Time    Glucose (POC) 132 (H) 08/21/2021 12:16 PM    Glucose (POC) 140 (H) 08/21/2021 09:03 AM    Glucose (POC) 130 (H) 08/20/2021 04:52 PM     No results found for: COLOR, APPRN, Ken Kelly, YORDAN, PROTU, GLUCU, KETU, BILU, UROU, REECE, LEUKU, GLUKE, EPSU, [place], Izzy, Calli Onofre    Radiology:  CTA CHEST W OR W WO CONT    Result Date: 8/14/2021  1. No evidence of pulmonary embolus. 2.  Moderate to severe COVID 19 pneumonia. XR CHEST PORT    Result Date: 8/20/2021  1. COVID-19 pneumonia. 2. Significantly increased interstitial and airspace disease may represent worsening COVID-19 pneumonia and/or superimposed pulmonary edema.       CODE STATUS:  x Full Code    DNR    Partial    Comfort Care       Signed:   Anay Cuevas MD  Date of Service:  8/21/2021  8:57 PM

## 2021-08-21 NOTE — PROGRESS NOTES
Verbal bedside report given to Sheyla Gray RN oncoming nurse by [facility]. Sandra Muñiz RN off-going nurse. Report included current pt status and condition, recent results, hx of present illness, heart rate and rhythm, and respiratory status.         [address] pt from BIPAP to hiflo 60L/min he ate too fast and needed to go back on BIPAP

## 2021-08-22 NOTE — PROGRESS NOTES
[facility] Adult  Hospitalist Group         8/22/2021       PATIENT ID: Katherin Morrow  MRN: [id number]   YOB: 1975    PRIMARY CARE PROVIDER: None   DATE OF ADMISSION: 8/14/2021  8:47 AM    ATTENDING PHYSICIAN: Sharon Lares MD  CONSULTATIONS:   IP CONSULT TO PULMONOLOGY  IP CONSULT TO PULMONOLOGY  IP CONSULT TO INTENSIVIST    PROCEDURES/SURGERIES:   * No surgery found *    REASON FOR ADMISSION: <principal problem not specified>     HOSPITAL PROBLEM LIST:  Patient Active Problem List   Diagnosis Code    ACL tear S83.519A    Severe sepsis ([facility] [address] A41.9, R65.20    Pneumonia due to COVID-19 virus U07.1, J12.82         Brief HPI and Hospital Course:    Chief complain:  Worsening SOB      History of present illness on admission 8/14/21:  Katherin Morrow is a 51 y.o. male who presents with NO PMHx presents to hospital with SOB that has been worsening over past 2 days. Patient states that his whole family has COVID, starting with his daughter. Patient states that he started to have body aches, fever, since last Sunday on 8/8. Pt was seen in ER on 8/12 at which time he had pneumonia but presume he was not hypoxic and sent home on Prednisone and Azithromycin. Patient states after going home he has progressively worsened. Patient now having worsening cough which is dry and SOB. Patient states he has been having fever of 102 at home. Patient is NOT vaccinated against COVID-19. Patient states he and his son were planning to get COVID Vaccine soon. No n/v diarrhea, abdominal pain,dysuria, weakness, numbness. Reason for ICU Admission on 8/15/21: COVID 19 PNA - Progressively getting worse      HPI on ICU admission:  (from H/P as patient is unable to provide history as he is tachypnic)   Monique Moody is a 51 y. o. male who presents with NO PMHx presents to hospital with SOB that has been worsening over past 2 days. Patient states that his whole family has COVID, starting with his daughter. Patient states that he started to have body aches, fever, since last Sunday on 8/8. Pt was seen in ER on 8/12 at which time he had pneumonia but presume he was not hypoxic and sent home on Prednisone and Azithromycin. Patient states after going home he has progressively worsened. Patient now having worsening cough which is dry and SOB. Patient states he has been having fever of 102 at home. Patient is NOT vaccinated against COVID-19. Patient states he and his son were planning to get COVID Vaccine soon. No n/v diarrhea, abdominal pain,dysuria, weakness, numbness.     ICU consulted for increasing oxygen requirements. Subjective:   Progress Note: 8/19/2021  from ICU team     Mr Jorge Hair is a 52 yo male who presented to the ED on 8/14 with no prior PMH with SOB x2 days. He had developed body aches and fever as of 8/8. He was diagnosed with COVID-19 pneumonia. He was not vaccinated. He was started on dexamethasone, remdesivir, and tocilizumab. He was admitted to the ICU on 8/15 for worsening hypoxia and required BiPAP. Upon exam this am, he was sitting in the chair, on BiPAP 80% 16/8 for several days. Able to get self from chair to bed today. Subjective   Follow up acute resp failure, COVID-19 PNA. Patient seen and examined at the bedside. Labs, images and notes reviewed  Discussed with nursing staff, orders reviewed. Plan discussed with patient/Family      Sitting in the chair,states that he continues to have cough,feels tired. Unable to come off BiPAP. Assessment and Plan:  #Severe COVID-19 pneumonia  Superimposed bacterial componenet  #Acute hypoxic respiratory failure, on continuous BiPAP, without much improvement  #Severe coughing spells with worsened SOB intermittently  #NABEEL-improving  #Obesity, BMI 34.65  #Acute deconditioning due to COVID-19  Mild hyponatremia  Transaminitis -improving    -Transfer to ICU on 8/15/2021.   Transfer out of ICU on 8/19/2021 to [facility] -1.28 today- add vancomycin and cefepime, sputum culture if possible.  -Continue BiPAP support as patient is not able to tolerate taking it off even for short period of time even with high flow, failed high flow today again  -S/p Actemra 8/14  -S/p remdesivir  -on solumedrol   -daily labs     -Continue supportive care: vitamin C, Tessalon Perles as needed, zinc,  Mucinex scheduled, as needed albuterol inhaler, as needed antitussive  -repeat CXR tomorrow  -Consider diuresis as tolerated with caution given NABEEL  - pulmonology following  -Lovenox per COVID-19 DVT prophylaxis protocol  -elevated D dimer could be related to underlying infection, CTA chest 8/14- no PE, has been on lovenox prophylaxis since admission    Will consider TPN based on further clinical course          CODE STATUS: Full code  DVT prophylaxis: Lovenox SQ    CRITICAL CARE ATTESTATION:  I had a face to face encounter with the patient, reviewed and interpreted patient data including clinical events, labs, images, vital signs, I/O's, and examined patient. I have discussed the case and the plan and management of the patient's care with the consulting services, the bedside nurses and necessary ancillary providers. NOTE OF PERSONAL INVOLVEMENT IN CARE   This patient has a high probability of imminent, clinically significant deterioration, which requires the highest level of preparedness to intervene urgently. I participated in the decision-making and personally managed or directed the management of the following life and organ supporting interventions that required my frequent assessment to treat or prevent imminent deterioration. I personally spent 40 minutes of critical care time. This is time spent at this critically ill patient's bedside actively involved in patient care as well as the coordination of care and discussions with the patient's family. This does not include any procedural time which has been billed separately.              PHYSICAL EXAMINATION:  Visit Vitals  /65 (BP 1 Location: Right upper arm, BP Patient Position: At rest;Sitting)   Pulse 94   Temp 97.7 °F (36.5 °C)   Resp 20   Ht 5' 10\" (1.778 m)   Wt 106.1 kg (234 lb)   SpO2 99%   BMI 33.58 kg/m²       General:          Alert, sitting up in chair, on BiPAP, respiratory distress intermittently with coughing spells   HEENT:           Atraumatic,   BiPAP mask in place           Neck:               Supple, symmetrical,  thyroid: non tender  Lungs:              decreased breath sounds , No wheezing or crackles  Heart:              Regular  rhythm,   normal rate,  No  murmur   minimal LE edema  Abdomen:       Soft, non-tender. Not distended. Extremities:     1+ LE edema  Skin:                Not pale. Not Jaundiced  No rashes   Psych:             Not anxious or agitated. Neurologic:      Alert, moves all extremities, oriented X 3. Labs:     Recent Labs     08/20/21  0336   WBC 16.3*   HGB 16.8   HCT 51.0*        Recent Labs     08/22/21  0322 08/21/21  0339 08/20/21  1748   *  --   --    K 4.9  --   --      --   --    CO2 22  --   --    BUN 22*  --   --    CREA 0.99  --   --    *  --   --    CA 8.6  --   --    MG 2.6* 2.5* 2.6*   PHOS 3.8 3.3 2.3*     Recent Labs     08/22/21  0322   *   *   TBILI 1.2*   TP 6.7   ALB 3.6   GLOB 3.1     No results for input(s): INR, PTP, APTT, INREXT, INREXT in the last 72 hours. No results for input(s): FE, TIBC, PSAT, FERR in the last 72 hours. No results found for: FOL, RBCF   No results for input(s): PH, PCO2, PO2 in the last 72 hours. No results for input(s): CPK, CKNDX, TROIQ in the last 72 hours.     No lab exists for component: CPKMB  No results found for: CHOL, CHOLX, CHLST, CHOLV, HDL, HDLP, LDL, LDLC, DLDLP, TGLX, TRIGL, TRIGP, CHHD, CHHDX  Lab Results   Component Value Date/Time    Glucose (POC) 136 (H) 08/22/2021 12:06 PM    Glucose (POC) 131 (H) 08/22/2021 09:06 AM    Glucose (POC) 128 (H) 08/21/2021 05:30 PM Glucose (POC) 132 (H) 08/21/2021 12:16 PM    Glucose (POC) 140 (H) 08/21/2021 09:03 AM     No results found for: COLOR, APPRN, SPGRU, REFSG, YORDAN, PROTU, GLUCU, Manohar Grosser, BILU, UROU, REECE, LEUKU, GLUKE, EPSU, [place], WBCU, Ezra [place]    Radiology:  CTA CHEST W OR W WO CONT    Result Date: 8/14/2021  1. No evidence of pulmonary embolus. 2.  Moderate to severe COVID 19 pneumonia. XR CHEST PORT    Result Date: 8/20/2021  1. COVID-19 pneumonia. 2. Significantly increased interstitial and airspace disease may represent worsening COVID-19 pneumonia and/or superimposed pulmonary edema.       CODE STATUS:  x Full Code    DNR    Partial    Comfort Care       Signed:   Katie Lopez MD  Date of Service:  8/22/2021  8:57 PM

## 2021-08-22 NOTE — PROGRESS NOTES
Pharmacist Note - Vancomycin Dosing    Consult provided for this 51 y.o. male for indication of HAP. Antibiotic regimen(s): Vanc + Cefepime  Patient on vancomycin PTA? NO     Recent Labs     21  0322 21  0336   WBC  --  16.3*   CREA 0.99  --    BUN 22*  --      Frequency of BMP: daily x 2  Height: 177.8 cm  Weight: 106.1 kg  Est CrCl: 112 ml/min; UO: 0.6 ml/kg/hr + 2 unmeasured  Temp (24hrs), Av.5 °F (36.9 °C), Min:97.7 °F (36.5 °C), Max:99.1 °F (37.3 °C)    Cultures:   blood - NG, final   MRSA swab- ordered    MRSA Swab ordered (if applicable)? YES    The plan below is expected to result in a target range of AUC/EVELYN 400-600    Therapy will be initiated with a loading dose of 2500 mg IV x 1 to be followed by a maintenance dose of 1250 mg IV every 12 hours. Pharmacy to follow patient daily and order levels / make dose adjustments as appropriate. *Vancomycin has been dosed used Bayesian kinetics software to target an AUC/EVELYN of 400-600, which provides adequate exposure for an assumed infection due to MRSA with an EVELYN of 1 or less while reducing the risk of nephrotoxicity as seen with traditional trough based dosing goals.

## 2021-08-22 NOTE — PROGRESS NOTES
08/22/21 0255   Oxygen Therapy   O2 Sat (%) 97 %   Pulse via Oximetry 61 beats per minute   O2 Device BIPAP   Skin Assessment Clean, dry, & intact   Skin Protection for O2 Device No   FIO2 (%) 100 %  (weaned to 90% per protocol)   Respiratory   Respiratory Pattern Regular; Tachypneic   CPAP/BIPAP   CPAP/BIPAP Start/Stop On   Device Mode BIPAP;S/T   $$ Bipap Daily Yes   Bio-Med ID # [id number]   Mask Type and Size Medium; Full face   Skin Condition Intact   PIP Observed 14 cm H20   IPAP (cm H2O) 14 cm H2O   EPAP (cm H2O) 8 cm H2O   Inspiratory Time (sec) 1 seconds   Vt Spont (ml) 647 ml   Ve Observed (l/min) 13.4 l/min   Backup Rate 4   Total RR (Spontaneous) 25 breaths per minute   Insp Rise Time (sec) 3   Leak (Estimated) 2 L/min

## 2021-08-22 NOTE — PROGRESS NOTES
Spiritual Care Assessment/Progress Note  [facility]      NAME: Deysi Pina      MRN: [id number]  AGE: 51 y.o. SEX: male  Moravian Affiliation: No Moravian   Language: English     8/22/2021     Total Time (in minutes): 7     Spiritual Assessment begun in [facility] through conversation with:         []Patient        [] Family    [] Friend(s)        Reason for Consult: Rapid response team     Spiritual beliefs: (Please include comment if needed)     [] Identifies with a luisana tradition:         [] Supported by a luisana community:            [] Claims no spiritual orientation:           [] Seeking spiritual identity:                [] Adheres to an individual form of spirituality:           [x] Not able to assess:                           Identified resources for coping:      [] Prayer                               [] Music                  [] Guided Imagery     [] Family/friends                 [] Pet visits     [] Devotional reading                         [x] Unknown     [] Other                                            Interventions offered during this visit: (See comments for more details)    Patient Interventions: Initial visit           Plan of Care:     [] Support spiritual and/or cultural needs    [] Support AMD and/or advance care planning process      [] Support grieving process   [] Coordinate Rites and/or Rituals    [] Coordination with community clergy   [] No spiritual needs identified at this time   [] Detailed Plan of Care below (See Comments)  [] Make referral to Music Therapy  [] Make referral to Pet Therapy     [] Make referral to Addiction services  [] Make referral to [facility]  [] Make referral to Spiritual Care Partner  [] No future visits requested        [x] Follow up upon further referrals     Comments:  responded to RRT called for patient. Unable to assess directly due to isolation precautions. Consulted with staff regarding plan of care.       Chaplain Berman Rose Mary Garcia

## 2021-08-22 NOTE — PROGRESS NOTES
1930: Bedside and Verbal shift change report given to Junior Simeon RN (oncoming nurse) by Jass Waters RN (offgoing nurse). Report included the following information SBAR, Kardex, ED Summary, Procedure Summary, Intake/Output, MAR, Recent Results, Med Rec Status, Cardiac Rhythm NSR, Alarm Parameters  and Dual Neuro Assessment. 2000: Resumed pt care, VSS. 3/10 lower back pain, PRN tylenol provided & ice pack. Pt up in chair. Drips: D5 @ 25/hr    0040: PRN melatonin given for sleep aid     0600: Pt unable to sleep d/t body aches/persistent cough, offered PRN robitusson/delsym/tylenol/tessalon but states, \"nothing works. \" Perfect serve sent to NP, orders for toradol     0730: Bedside and Verbal shift change report given to TONA Gu (oncoming nurse) by Junior Simeon RN (offgoing nurse). Report included the following information SBAR, Kardex, ED Summary, Procedure Summary, Intake/Output, MAR, Recent Results, Med Rec Status, Cardiac Rhythm NSR, Alarm Parameters  and Dual Neuro Assessment.

## 2021-08-22 NOTE — PROGRESS NOTES
Verbal bedside report given to [facility], RN in ICU by CARMEN Mae, TONA for escalation of care. Report included current pt status and condition, recent results, hx of present illness, heart rate and rhythm, and respiratory status. 1527  Pt desatting even on BIPAP, contacted provider, Provider recommended calling rapid response. Intensivist advised escalating pt to ICU care. 1200  Tried to move pt to hi-laci so he can eat, but he cannot maintain sats above the 80s, had to be put back on BIPAP.

## 2021-08-22 NOTE — CONSULTS
[facility]    ICU TEAM Progress Note    Name: Dane Spicer   : 1975   MRN: [id number]   Date: 2021      Subjective:   Progress Note: 2021      Mr Claudy Calderón is a 52 yo male who presented to the ED on  with no prior PMH with SOB x2 days. He had developed body aches and fever as of . He was diagnosed with COVID-19 pneumonia. He was not vaccinated. He was started on dexamethasone, remdesivir -, and tocilizumab . He was admitted to the ICU on 8/15 for worsening hypoxia and required BiPAP. He remained in the ICU from 8/15- on BiPAP. He weaned from 100% to 80%  and was able to get himself from bed to chair. He was transferred to the [facility] on . FIO2 further decreased to 70% on . Hi Flow was attempted , but he quickly desaturated to 72%. He was placed back on BiPAP at 80% FIO2 (IPAP/EPAP not listed). CXR on  noted to have increased infiltrates. He was able to take a break from BiPAP on  to eat, transitioning from HiFlow, but again became hypoxic, needing to go back to BiPAP. As of , he reported progressively worsening cough, which seemed more intense when he would take breaks from the BiPAP and attempt to eat. This was corroborated by staff. During these coughing episodes, sats would decrease as low as 50s. He had been increased to 100% FIo2 on BiPAP  at the time of my exam.  Procal was elevated to 1.88. WBC inc to 16.3. He had been started on empiric vanc/cefepime. Upon my evaluation, I elected to readmit to the ICU. I discussed the plan with the patient. He is currently on 100% FIo2  BiPAP sitting in the chair. His WOB has improved, but he is not able to tolerate coming off the BiPAP. We will start PPN. I will start precedex. Empiric antibiotics will be continued. Steroids will be continued. Cultures have been ordered. MRSA swab has already been ordered.   Patient understands that intubation would be next step if he deteriorates and he is in agreement with that. Plan and transition of care discussed with hospitalist, bedside RN, and ICU charge RN. Active Problem List:     Problem List  Never Reviewed        Codes Class    Severe sepsis ([facility] [address] ICD-10-CM: A41.9, R65.20  ICD-9-CM: 038.9, 995.92         Pneumonia due to COVID-19 virus ICD-10-CM: U07.1, J12.82  ICD-9-CM: 480.8, 079.89         ACL tear ICD-10-CM: N20.139B  ICD-9-CM: 844.2               Past Medical History:      has no past medical history of Abuse, Anemia NEC, Calculus of kidney, Congestive heart failure, unspecified, Contact dermatitis and other eczema, due to unspecified cause, Depression, Headache(784.0), Hypercholesterolemia, Psychotic disorder, or Trauma. Past Surgical History:      has a past surgical history that includes hx heent and hx orthopaedic. Home Medications:     Prior to Admission medications    Medication Sig Start Date End Date Taking? Authorizing Provider   azithromycin (ZITHROMAX) 250 mg tablet Take 2 tablets today, then take 1 tablet daily 8/12/21   Lisa Norman NP   albuterol sulfate (PROAIR RESPICLICK) 90 mcg/actuation breath activated inhaler Take 2 Puffs by inhalation four (4) times daily as needed for Cough (SOB, wheezing). 8/12/21   Lisa Norman NP   EPINEPHrine (EPIPEN 2-SOLITARIO) 0.3 mg/0.3 mL (1:1,000) injection 0.3 mL by IntraMUSCular route once as needed for 1 dose. 8/29/13   Ricki Mazariegos MD   diazepam (VALIUM) 5 mg tablet Take 1 Tab by mouth every eight (8) hours as needed (spasm). 11/13/12   LIBERTY Awad       Allergies/Social/Family History:     No Known Allergies   Social History     Tobacco Use    Smoking status: Former Smoker     Packs/day: 1.00     Years: 10.00     Pack years: 10.00    Smokeless tobacco: Never Used   Substance Use Topics    Alcohol use: Yes     Alcohol/week: 3.3 standard drinks     Types: 4 Cans of beer per week      No family history on file.     Review of Systems: As per HPI    Objective:   Vital Signs:  Visit Vitals  /65 (BP 1 Location: Right upper arm, BP Patient Position: At rest;Sitting)   Pulse 94   Temp 97.7 °F (36.5 °C)   Resp 20   Ht 5' 10\" (1.778 m)   Wt 106.1 kg (234 lb)   SpO2 99%   BMI 33.58 kg/m²    O2 Flow Rate (L/min): 60 l/min O2 Device: BIPAP Temp (24hrs), Av.5 °F (36.9 °C), Min:97.7 °F (36.5 °C), Max:99.1 °F (37.3 °C)           Intake/Output:     Intake/Output Summary (Last 24 hours) at 2021 1633  Last data filed at 2021 1211  Gross per 24 hour   Intake 604.59 ml   Output 1200 ml   Net -595.41 ml       Physical Exam:    General:Awake, alert, on BiPAP, moderate dypnea  Eye: PERRLA  Neurologic:  oriented, grossly non-focal  Neck:  Supple, no JVD  Lungs:CTAB, diminished bases, on BiPAP  Heart:  RRR, 2+ pulses, no edema  Abdomen:  Soft, nontender, nondistended  Skin:c/d/i    LABS AND  DATA: Personally reviewed  Recent Labs     21  0336   WBC 16.3*   HGB 16.8   HCT 51.0*        Recent Labs     21  0322 21  0339   *  --    K 4.9  --      --    CO2 22  --    BUN 22*  --    CREA 0.99  --    *  --    CA 8.6  --    MG 2.6* 2.5*   PHOS 3.8 3.3     Recent Labs     21  0322   *   TP 6.7   ALB 3.6   GLOB 3.1     No results for input(s): INR, PTP, APTT, INREXT, INREXT in the last 72 hours. No results for input(s): PHI, PCO2I, PO2I, FIO2I in the last 72 hours. No results for input(s): CPK, CKMB, TROIQ, BNPP in the last 72 hours. BiPAP 100%     MEDS: Reviewed    Chest X-Ray: personally reviewed and report checked    : CXR: Significantly increased interstitial and airspace disease may represent worsening COVID-19 pneumonia and/or superimposed pulmonary edema. Assessment:     Acute hypoxic respiratory failure secondary to COVID-19 Pneumonia: Admitted to the hospital . Initial symptoms . Unvaccinated. He was started on dexamethasone, remdesivir, and tocilizumab.  He was admitted to the ICU on 8/15 for worsening hypoxia and required BiPAP. Initially went to floor on 8/18, but back to ICU on 8/22 for worsening hypoxia.  - Continue empiric vanc/cefepime 8/22 - present  - MRSA swab pending  - BLC pending  - Wean BiPAP as tolerated  - OOB to chair as tolerated for V/Q  - Continue methylpred (completed dexamethasone, changed to methylpred)  - Repeating CXR  - Zinc  - Vitamin C  - Lovenox ppx - sending fibrinogen; DVT exam and will make further determinations on full dose AC    Anxiety, situational/agitation/BiPAP associated discomfort: Precedex    NABEEL:    - Repeat BMP - this am specimen demonstrated hemolysis and hyponatremia concerning for spurious values; may diurese, but need to re-eval BMP    Moderate protein calorie malnutrition:  - PPN  - Cannot come off BiPAP for diet  - NST consulted    Acute deconditioning c/b obesity:  - OOB to chair as tolerated for V/Q and pulmonary offloading   - Able to get self from bed to chair    Multidisciplinary Rounds Completed: Yes    ABCDEF Bundle/Checklist  Pain Medications: None  Target RASS: 0 - Alert & Calm - Spontaneously pays attention to caregiver  Sedation Medications: None  CAM-ICU:  Negative  Mobility: Fair  PT/OT: PT consulted and on board and OT consulted and on board   Restraints: None needed at this time  Discussed Plan of Care (goals of care):  Yes  Addressed Code Status: Full Code    CARDIOVASCULAR  Cardiac Gtts: None  SBP Goal of: > 90 mmHg  MAP Goal of: > 65 mmHg  Transfusion Trigger (Hgb): <7 g/dL    RESPIRATORY  Vent Goals:   Optimize PEEP/Ventilation/Oxygenation  DVT Prophylaxis (if no, list reason): Lovenox   SPO2 Goal: > 92%  Pulmonary toilet: Duo-Nebs     GI/  Brownlee Catheter Present: No  GI Prophylaxis: N  Nutrition: Yes PPN  IVFs: None  Bowel Movement: Yes  Bowel Regimen: Docusate (Colace)  Insulin: N    ANTIBIOTICS  Antibiotics:  Vanc/cef    T/L/D  Tubes: None  Lines: None  Drains: None    SPECIAL EQUIPMENT  None    DISPOSITION  Transfer to IMU    CRITICAL CARE CONSULTANT NOTE  I had a face to face encounter with the patient, reviewed and interpreted patient data including clinical events, labs, images, vital signs, I/O's, and examined patient. I have discussed the case and the plan and management of the patient's care with the consulting services, the bedside nurses and the respiratory therapist.      NOTE OF PERSONAL INVOLVEMENT IN CARE   This patient has a high probability of imminent, clinically significant deterioration, which requires the highest level of preparedness to intervene urgently. I participated in the decision-making and personally managed or directed the management of the following life and organ supporting interventions that required my frequent assessment to treat or prevent imminent deterioration. I personally spent 60 minutes of critical care time. This is time spent at this critically ill patient's bedside actively involved in patient care as well as the coordination of care and discussions with the patient's family. This does not include any procedural time which has been billed separately.     CR[facility]  8/22/2021

## 2021-08-22 NOTE — PROGRESS NOTES
Patient desaturating to 60% despite on BiPAP. Rapid response called. Discussed with ICU team-plan for ICU transfer.

## 2021-08-22 NOTE — PROGRESS NOTES
Problem: Risk for Spread of Infection  Goal: Prevent transmission of infectious organism to others  Description: Prevent the transmission of infectious organisms to other patients, staff members, and visitors. Outcome: Progressing Towards Goal     Problem: Patient Education:  Go to Education Activity  Goal: Patient/Family Education  Outcome: Progressing Towards Goal     Problem: Breathing Pattern - Ineffective  Goal: *Absence of hypoxia  Outcome: Progressing Towards Goal  Goal: *Use of effective breathing techniques  Outcome: Progressing Towards Goal  Goal: *PALLIATIVE CARE:  Alleviation of Dyspnea  Outcome: Progressing Towards Goal     Problem: Patient Education: Go to Patient Education Activity  Goal: Patient/Family Education  Outcome: Progressing Towards Goal     Problem: Airway Clearance - Ineffective  Goal: Achieve or maintain patent airway  Outcome: Progressing Towards Goal     Problem: Gas Exchange - Impaired  Goal: Absence of hypoxia  Outcome: Progressing Towards Goal  Goal: Promote optimal lung function  Outcome: Progressing Towards Goal     Problem: Breathing Pattern - Ineffective  Goal: Ability to achieve and maintain a regular respiratory rate  Outcome: Progressing Towards Goal     Problem:  Body Temperature -  Risk of, Imbalanced  Goal: Ability to maintain a body temperature within defined limits  Outcome: Progressing Towards Goal  Goal: Will regain or maintain usual level of consciousness  Outcome: Progressing Towards Goal  Goal: Complications related to the disease process, condition or treatment will be avoided or minimized  Outcome: Progressing Towards Goal     Problem: Isolation Precautions - Risk of Spread of Infection  Goal: Prevent transmission of infectious organism to others  Outcome: Progressing Towards Goal     Problem: Nutrition Deficits  Goal: Optimize nutrtional status  Outcome: Progressing Towards Goal     Problem: Risk for Fluid Volume Deficit  Goal: Maintain normal heart rhythm  Outcome: Progressing Towards Goal  Goal: Maintain absence of muscle cramping  Outcome: Progressing Towards Goal  Goal: Maintain normal serum potassium, sodium, calcium, phosphorus, and pH  Outcome: Progressing Towards Goal     Problem: Loneliness or Risk for Loneliness  Goal: Demonstrate positive use of time alone when socialization is not possible  Outcome: Progressing Towards Goal     Problem: Fatigue  Goal: Verbalize increase energy and improved vitality  Outcome: Progressing Towards Goal     Problem: Patient Education: Go to Patient Education Activity  Goal: Patient/Family Education  Outcome: Progressing Towards Goal     Problem: Falls - Risk of  Goal: *Absence of Falls  Description: Document Simeon Fall Risk and appropriate interventions in the flowsheet.   Outcome: Progressing Towards Goal  Note: Fall Risk Interventions:            Medication Interventions: Evaluate medications/consider consulting pharmacy, Patient to call before getting OOB, Teach patient to arise slowly, Utilize gait belt for transfers/ambulation, Assess postural VS orthostatic hypotension, Bed/chair exit alarm    Elimination Interventions: Bed/chair exit alarm, Call light in reach, Patient to call for help with toileting needs, Toileting schedule/hourly rounds, Urinal in reach, Toilet paper/wipes in reach, Stay With Me (per policy)              Problem: Patient Education: Go to Patient Education Activity  Goal: Patient/Family Education  Outcome: Progressing Towards Goal     Problem: Pain  Goal: *Control of Pain  Outcome: Progressing Towards Goal  Goal: *PALLIATIVE CARE:  Alleviation of Pain  Outcome: Progressing Towards Goal     Problem: Patient Education: Go to Patient Education Activity  Goal: Patient/Family Education  Outcome: Progressing Towards Goal

## 2021-08-23 NOTE — PROGRESS NOTES
Bedside and Verbal shift change report given to [address] (oncoming nurse) by Dorita Molina  (offgoing nurse). Report included the following information SBAR, Kardex, Intake/Output, Cardiac Rhythm SR/SB and Quality Measures       Shift Summary    Rec'd patient in recliner sitting forward at times. Eyes closes at present, on BIPAP 16/10 100%  O2 sat 88-95 %   Noted increase work of breathing at time during coughing spells. Medication with Robitussin-AC as ordered. Resting in recliner at present refused to return to bed, stated that \"the bed makes it worst\". HR 60 O2 sat  90 %, /78 at present. PRECEDEX infusing at 0.4 mcg/kg/hr at present.

## 2021-08-23 NOTE — PROGRESS NOTES
1830: pt received from [facility] via stretcher and [facility]. Placed in chair. Currently on 100% bipap and o2 saturations in upper 90's. No distress. Noted swollen right arm. PIV with no blood return, IV removed. Notified provider of arm swelling. Primary Nurse Carmen Edwards RN and Jose Guo RN performed a dual skin assessment on this patient No impairment noted  Boo score is 23.    2000: Pt stated that his left pinky and ring finger were numb. Noted dusky nail beds. Notified provider, Luz Maria Plummer NP.    0500: pt wanted precedex off because it was making him feel \"groggy\". precedex stopped. 1270: pt stood to void. Saturations dropped into 70's with slow return. Pt with lots of coughing despite being given Robitussin AC. O2 saturations slow to recover out of 80s.

## 2021-08-23 NOTE — PROGRESS NOTES
[facility]    ICU TEAM Progress Note    Name: Allison Lee   : 1975   MRN: [id number]   Date: 2021      Subjective:   Progress Note: 2021      Mr Dorian Suresh is a 50 yo male who presented to the ED on  with no prior PMH with SOB x2 days. He had developed body aches and fever as of . He was diagnosed with COVID-19 pneumonia. He was not vaccinated. He was started on dexamethasone, remdesivir -, and tocilizumab . He was admitted to the ICU on 8/15 for worsening hypoxia and required BiPAP. He remained in the ICU from 8/15- on BiPAP. He weaned from 100% to 80%  and was able to get himself from bed to chair. He was transferred to the [facility] on . FIO2 further decreased to 70% on . Hi Flow was attempted , but he quickly desaturated to 72%. He was placed back on BiPAP at 80% FIO2 (IPAP/EPAP not listed). CXR on  noted to have increased infiltrates. He was able to take a break from BiPAP on  to eat, transitioning from HiFlow, but again became hypoxic, needing to go back to BiPAP. As of , he reported progressively worsening cough, which seemed more intense when he would take breaks from the BiPAP and attempt to eat. This was corroborated by staff. During these coughing episodes, sats would decrease as low as 50s. He had been increased to 100% FIo2 on BiPAP  at the time of my exam.  Procal was elevated to 1.88. WBC inc to 16.3. He had been started on empiric vanc/cefepime.       Overnight Events:   -- 100% BiPAP & Precedex infusion    Active Problem List:     Problem List  Never Reviewed        Codes Class    Severe sepsis ([facility] [address] ICD-10-CM: A41.9, R65.20  ICD-9-CM: 038.9, 995.92         Pneumonia due to COVID-19 virus ICD-10-CM: U07.1, J12.82  ICD-9-CM: 480.8, 079.89         ACL tear ICD-10-CM: Z00.970B  ICD-9-CM: 844.2               Past Medical History:      has no past medical history of Abuse, Anemia NEC, Calculus of kidney, Congestive heart failure, unspecified, Contact dermatitis and other eczema, due to unspecified cause, Depression, Headache(784.0), Hypercholesterolemia, Psychotic disorder, or Trauma. Past Surgical History:      has a past surgical history that includes hx heent and hx orthopaedic. Home Medications:     Prior to Admission medications    Medication Sig Start Date End Date Taking? Authorizing Provider   azithromycin (ZITHROMAX) 250 mg tablet Take 2 tablets today, then take 1 tablet daily 21   Georgina Cleveland NP   albuterol sulfate (PROAIR RESPICLICK) 90 mcg/actuation breath activated inhaler Take 2 Puffs by inhalation four (4) times daily as needed for Cough (SOB, wheezing). 21   Georgina Cleveland NP   EPINEPHrine (EPIPEN 2-SOLITARIO) 0.3 mg/0.3 mL (1:1,000) injection 0.3 mL by IntraMUSCular route once as needed for 1 dose. 13   Nelson Pruett MD   diazepam (VALIUM) 5 mg tablet Take 1 Tab by mouth every eight (8) hours as needed (spasm). 12   LIBERTY Clinton       Allergies/Social/Family History:     No Known Allergies   Social History     Tobacco Use    Smoking status: Former Smoker     Packs/day: 1.00     Years: 10.00     Pack years: 10.00    Smokeless tobacco: Never Used   Substance Use Topics    Alcohol use: Yes     Alcohol/week: 3.3 standard drinks     Types: 4 Cans of beer per week      No family history on file.     Review of Systems:     As per HPI    Objective:   Vital Signs:  Visit Vitals  /79   Pulse 71   Temp (!) 96.6 °F (35.9 °C)   Resp (!) 32   Ht 5' 10\" (1.778 m)   Wt 106.1 kg (234 lb)   SpO2 (!) 89%   BMI 33.58 kg/m²    O2 Flow Rate (L/min): 60 l/min O2 Device: BIPAP Temp (24hrs), Av.8 °F (36 °C), Min:96.4 °F (35.8 °C), Max:97.7 °F (36.5 °C)           Intake/Output:     Intake/Output Summary (Last 24 hours) at 2021 0816  Last data filed at 2021 0519  Gross per 24 hour   Intake 850.01 ml   Output 675 ml   Net 175.01 ml       Physical Exam:    Orvan Chick, alert, on BiPAP, moderate dypnea  Eye: PERRLA  Neurologic:  oriented, grossly non-focal  Neck:  Supple, no JVD  Lungs:CTAB, diminished bases, on BiPAP  Heart:  RRR, 2+ pulses, no edema  Abdomen:  Soft, nontender, nondistended  Skin:c/d/i    LABS AND  DATA: Personally reviewed  No results for input(s): WBC, HGB, HCT, PLT, HGBEXT, HCTEXT, PLTEXT, HGBEXT, HCTEXT, PLTEXT in the last 72 hours. Recent Labs     08/23/21  0505 08/22/21 2051 08/22/21  0322 08/22/21  0322   NA PENDING 130*   < > 128*   K PENDING 4.5   < > 4.9   CL PENDING 101   < > 102   CO2 20* 26   < > 22   BUN 22* 23*   < > 22*   CREA 0.99 1.02   < > 0.99   * 166*   < > 149*   CA 8.3* 8.3*   < > 8.6   MG 2.8*  --   --  2.6*   PHOS 3.4  --   --  3.8    < > = values in this interval not displayed. Recent Labs     08/23/21  0505 08/22/21 0322   * 151*   TP 6.3* 6.7   ALB 3.2* 3.6   GLOB 3.1 3.1     No results for input(s): INR, PTP, APTT, INREXT, INREXT in the last 72 hours. No results for input(s): PHI, PCO2I, PO2I, FIO2I in the last 72 hours. No results for input(s): CPK, CKMB, TROIQ, BNPP in the last 72 hours. BiPAP 100% 16/8    MEDS: Reviewed    Chest X-Ray: personally reviewed and report checked    8/20: CXR: Significantly increased interstitial and airspace disease may represent worsening COVID-19 pneumonia and/or superimposed pulmonary edema. Assessment:     Acute hypoxic respiratory failure secondary to COVID-19 Pneumonia: Admitted to the hospital 8/14. Initial symptoms 8/8. Unvaccinated. He was started on dexamethasone, remdesivir, and tocilizumab. He was admitted to the ICU on 8/15 for worsening hypoxia and required BiPAP.  Initially went to floor on 8/18, but back to ICU on 8/22 for worsening hypoxia.  - Continue empiric vanc/cefepime 8/22 - present  - MRSA swab pending  - BLC pending  - Wean BiPAP as tolerated  - OOB to chair as tolerated for V/Q  - Continue methylpred (completed dexamethasone, changed to methylpred)  - Repeating CXR  - Lovenox ppx - sending fibrinogen; DVT exam and will make further determinations on full dose AC    Anxiety, situational/agitation/BiPAP associated discomfort:   -Precedex    NABEEL:    - Trend BMP - this am specimen demonstrated hemolysis and hyponatremia concerning for spurious values; may diurese, but need to re-eval BMP    Moderate protein calorie malnutrition:  - PPN  - Cannot come off BiPAP for diet  - NST consulted    Acute deconditioning c/b obesity:  - OOB to chair as tolerated for V/Q and pulmonary offloading   - Able to get self from bed to chair    Multidisciplinary Rounds Completed: Yes    ABCDEF Bundle/Checklist  Pain Medications: None  Target RASS: 0 - Alert & Calm - Spontaneously pays attention to caregiver  Sedation Medications: Precedex  CAM-ICU:  Negative  Mobility: Fair  PT/OT: PT consulted and on board and OT consulted and on board   Restraints: None needed at this time  Discussed Plan of Care (goals of care): Yes  Addressed Code Status: Full Code    CARDIOVASCULAR  Cardiac Gtts: None  SBP Goal of: > 90 mmHg  MAP Goal of: > 65 mmHg  Transfusion Trigger (Hgb): <7 g/dL    RESPIRATORY  Vent Goals:   Optimize PEEP/Ventilation/Oxygenation  DVT Prophylaxis (if no, list reason): Lovenox   SPO2 Goal: > 92%  Pulmonary toilet: Duo-Nebs     GI/  Brownlee Catheter Present: No  GI Prophylaxis: N  Nutrition: Yes PPN  IVFs: None  Bowel Movement: Yes  Bowel Regimen: Docusate (Colace)  Insulin: N    ANTIBIOTICS  Antibiotics:  Vanc/cef    T/L/D  Tubes: None  Lines: None  Drains: None    SPECIAL EQUIPMENT  None    DISPOSITION  Transfer to IMU    CRITICAL CARE CONSULTANT NOTE  I had a face to face encounter with the patient, reviewed and interpreted patient data including clinical events, labs, images, vital signs, I/O's, and examined patient.   I have discussed the case and the plan and management of the patient's care with the consulting services, the bedside nurses and the respiratory therapist.      NOTE OF PERSONAL INVOLVEMENT IN CARE   This patient has a high probability of imminent, clinically significant deterioration, which requires the highest level of preparedness to intervene urgently. I participated in the decision-making and personally managed or directed the management of the following life and organ supporting interventions that required my frequent assessment to treat or prevent imminent deterioration. I personally spent 90 minutes of critical care time. This is time spent at this critically ill patient's bedside actively involved in patient care as well as the coordination of care and discussions with the patient's family. This does not include any procedural time which has been billed separately.     Lei Enamorado, DO   Staff Intensivist/Anesthesiologist  Critical Care Medicine

## 2021-08-23 NOTE — PROGRESS NOTES
Comprehensive Nutrition Assessment    Type and Reason for Visit: Initial, Consult    Nutrition Recommendations/Plan:      Add 500 ml 20% lipid 3 x/week    Place Axium Nanofibers (as able)-transition to full parenteral nutrition support     Suggested goal:  7.5% AA D15 @ 83 ml/hr with 500 ml 20% lipid 3 x/week    Nutrition Assessment:    Pt admitted with Severe Sepsis d/t COVID 19 PNA. No PMHx. Acute hypoxic respiratory failure requiring BIPAP support. Little nutrient PO intake d/t oxygen desaturation with attempts to eat on HFNC. Discontinued diet today; PPN ordered yesterday. Added IV lipid today. PPN as ordered with 500 ml 20% lipid 3 x/week will provide 5422-1843 ml, 85 gm protein and 1450 average daily calories to meet 57-68% estimated protein and energy needs, respectively. Recommend placing central line in order to provide full parenteral nutrition. Not able to place DHT for enteral feeds at this time. Suggested goal TPN to meet estimated needs: 7.5% AA D15 @ 83 ml/hr with 500 ml 20% lipid 3 x/week (3974-9699 ml, 150 gm protein, 300 gm CHO/2.0 mg/kg/min and 2050 average daily calories. Potassium elevated but only receiving 60 mEq in the PPN. Sodium BNL. Will be easier to adjust lytes if able to transition to TPN solution. BG elevated d/t steroid but remains less than 180. Malnutrition Assessment:  Malnutrition Status: At risk for malnutrition (specify)-COVID PNA with inadequate nutrient intake  Context:  Acute illness       Nutritionally Significant Medications:   Colace, Humalog, Solumedrol, Senokot    Estimated Daily Nutrient Needs:  Energy (kcal): 2120 (20 kcal/kg); Weight Used for Energy Requirements: Current  Protein (g): 150 (2g/kg IBW);  Weight Used for Protein Requirements: Ideal  Fluid (ml/day):  ; Method Used for Fluid Requirements: 1 ml/kcal    Nutrition Related Findings:       BM: 8/21  Edema: 2+ NP RUE  Wounds:  None       Current Nutrition Therapies:   Diet: NPO  PPN: 4.25% AA D10 @ 83 ml/hr No lipid  Additional Caloric Sources:   None  Meal intake: Patient Vitals for the past 168 hrs:   % Diet Eaten   08/22/21 1211 0%   08/21/21 1600 0%   08/21/21 1200 0%   08/21/21 1022 0%     Anthropometric Measures:  · Height:  5' 10\" (177.8 cm)  · Current Body Wt:  106.1 kg (233 lb 14.5 oz)   · Admission Body Wt:  247 lb 5.7 oz     · Ideal Body Wt:  166:  140.9 %   · BMI Categories:  Obese class 1 (BMI 30.0-34. 9)     Wt Readings from Last 10 Encounters:   08/21/21 106.1 kg (234 lb)   06/10/18 116.6 kg (257 lb)   04/01/14 111.6 kg (246 lb)   10/23/13 100.6 kg (221 lb 12.8 oz)   08/29/13 86.2 kg (190 lb)   11/13/12 90.7 kg (200 lb)   05/03/12 90.7 kg (200 lb)   04/06/12 91.6 kg (202 lb)   12/15/11 86.2 kg (190 lb)       Nutrition Diagnosis:   · Inadequate oral intake related to impaired respiratory function as evidenced by NPO or clear liquid status due to medical condition, nutrition support-parenteral nutrition. Nutrition Interventions:   Food and/or Nutrient Delivery: Modify parenteral nutrition  Nutrition Education and Counseling: No recommendations at this time  Coordination of Nutrition Care: Continue to monitor while inpatient, Interdisciplinary rounds    Goals:  Transition to full parenteral nutrition support in next 2-3 days. Nutrition Monitoring and Evaluation:   Behavioral-Environmental Outcomes: None identified  Food/Nutrient Intake Outcomes: Parenteral nutrition intake/tolerance  Physical Signs/Symptoms Outcomes: Biochemical data, Weight, GI status    Discharge Planning:     Too soon to determine     Suhas Ha RD CNSC  Contact: Talia Vasquez

## 2021-08-24 NOTE — PROGRESS NOTES
Transitions of care  Patient remains in the ICU on isolation COVID 19 virus on Bipap and a Precedex gtt. Patient   Patient unable to participate with therapy at this time. Patient's girlfriend Harman Nicholas [phone] now MPOA. Care management is continuing to follow.    Mckinley Newberry RN,Care Management

## 2021-08-24 NOTE — PROGRESS NOTES
Spiritual Care Assessment/Progress Note  [facility]      NAME: Maya Sauer      MRN: [id number]  AGE: 51 y.o.  SEX: male  Pentecostal Affiliation: No Buddhist   Language: English     8/23/2021     Total Time (in minutes): 101     Spiritual Assessment begun in [address] through conversation with:         [x]Patient        [] Family    [] Friend(s)        Reason for Consult: Advance medical directive consult     Spiritual beliefs: (Please include comment if needed)     [] Identifies with a luisana tradition:         [] Supported by a luisana community:            [] Claims no spiritual orientation:           [] Seeking spiritual identity:                [] Adheres to an individual form of spirituality:           [x] Not able to assess:                           Identified resources for coping:      [] Prayer                               [] Music                  [] Guided Imagery     [x] Family/friends                 [] Pet visits     [] Devotional reading                         [] Unknown     [] Other:                                              Interventions offered during this visit: (See comments for more details)    Patient Interventions: Advance medical directive completed, Advance medical directive consult           Plan of Care:     [x] Support spiritual and/or cultural needs    [x] Support AMD and/or advance care planning process      [] Support grieving process   [] Coordinate Rites and/or Rituals    [] Coordination with community clergy   [] No spiritual needs identified at this time   [] Detailed Plan of Care below (See Comments)  [] Make referral to Music Therapy  [] Make referral to Pet Therapy     [] Make referral to Addiction services  [] Make referral to [facility]  [] Make referral to Spiritual Care Partner  [] No future visits requested        [x] Follow up upon further referrals     Comments:  responded to page from TONA Joy that patient wanted to complete an AMD. Patient was in the room and under Contact precautions.  consulted with RN about patients capacity to complete an AMD. Patient wanted to complete the AMD because he is afraid of becoming intubated at any time.  sent the document in with the nurse for the patient to review, Patient reviewed the document and  entered the room to make sure the decision was his and and he signed the document.  made 4 copies and gave the original and 3 copies to the nurse and she gave them to the patient.  placed one in the patients chart to be scanned. Rev.  Buck Craft MDiv  [facility] [place] Staff   [address]  [address]  [email or web address]

## 2021-08-24 NOTE — PROGRESS NOTES
Bedside and Verbal shift change report given to [facility] (oncoming nurse) by Elier Tai (offgoing nurse). Report included the following information SBAR, Kardex, Intake/Output and Cardiac Rhythm SR/SB. Patient remain sitting in recliner on BIPAP 16/10 100%% throughout this shift. Having frequent coughing spells. Cough medication given as ordered. External cath placed  Voiding clear yellow urine noted. I & O's documented. Noted SR/SB increased T waves. O2 sat 88-95  Noted 77 O2 sat  at times during coughing spells.

## 2021-08-24 NOTE — PROGRESS NOTES
2200: pt indicated that he wanted his girlfriend to be his healthcare decision maker. In asking clarifying questions it was determined that pt is still legally  to another woman whom he has adult children with. He indicated that he did not want them to make decisions for him. Notified  for Advanced directive paperwork. Pt is oriented and made the determination that he wanted Dakota Norman his girlfriend and second decision maker Amelie Patel (sister) on his own. Paperwork completed and placed in chart.

## 2021-08-24 NOTE — ACP (ADVANCE CARE PLANNING)
Request by RN Daniella Patiño to assist with Advance Medical Directive (AMD) in ICU 9. Consulted with patients nurse. Explained document to patient and, who were present in the room. Assisted patient in completing the document. Made a copy for patients chart and put in chart box. Returned original document and another copy to the patient. Rev.  Marjorie Reece MDiv  [facility] [place] Staff   [address] [place] [address]  [email or web address]

## 2021-08-24 NOTE — PROGRESS NOTES
SPEECH THERAPY SCREENING:  SERVICES ARE NOT INDICATED AT THIS TIME    [facility] based on results obtained during the nursing admission assessment. The patients chart was reviewed and the patient is not appropriate for a skilled therapy evaluation at this time as he is requiring BiPap. Please consult speech therapy if any therapy needs arise. Thank you.     John Mcmillan, SLP

## 2021-08-24 NOTE — PROGRESS NOTES
[facility]    ICU TEAM Progress Note    Name: Janea Mcgrath   : 1975   MRN: [id number]   Date: 2021      Subjective:   Progress Note: 2021      Mr Jorge Hair is a 52 yo male who presented to the ED on  with no prior PMH with SOB x2 days. He had developed body aches and fever as of . He was diagnosed with COVID-19 pneumonia. He was not vaccinated. He was started on dexamethasone, remdesivir -, and tocilizumab . He was admitted to the ICU on 8/15 for worsening hypoxia and required BiPAP. He remained in the ICU from 8/15- on BiPAP. He weaned from 100% to 80%  and was able to get himself from bed to chair. He was transferred to the [facility] on . FIO2 further decreased to 70% on . Hi Flow was attempted , but he quickly desaturated to 72%. He was placed back on BiPAP at 80% FIO2 (IPAP/EPAP not listed). CXR on  noted to have increased infiltrates. He was able to take a break from BiPAP on  to eat, transitioning from HiFlow, but again became hypoxic, needing to go back to BiPAP. As of , he reported progressively worsening cough, which seemed more intense when he would take breaks from the BiPAP and attempt to eat. This was corroborated by staff. During these coughing episodes, sats would decrease as low as 50s. He had been increased to 100% FIo2 on BiPAP  at the time of my exam.  Procal was elevated to 1.88. WBC inc to 16.3. He had been started on empiric vanc/cefepime.       Overnight Events:   -- 100% BiPAP & Precedex infusion   -- 100% BiPAP & Precedex infusion    Active Problem List:     Problem List  Never Reviewed        Codes Class    Severe sepsis ([facility] [address] ICD-10-CM: A41.9, R65.20  ICD-9-CM: 038.9, 995.92         Pneumonia due to COVID-19 virus ICD-10-CM: U07.1, J12.82  ICD-9-CM: 480.8, 079.89         ACL tear ICD-10-CM: G19.974Q  ICD-9-CM: 844.2               Past Medical History:      has no past medical history of Abuse, Anemia NEC, Calculus of kidney, Congestive heart failure, unspecified, Contact dermatitis and other eczema, due to unspecified cause, Depression, Headache(784.0), Hypercholesterolemia, Psychotic disorder, or Trauma. Past Surgical History:      has a past surgical history that includes hx heent and hx orthopaedic. Home Medications:     Prior to Admission medications    Medication Sig Start Date End Date Taking? Authorizing Provider   azithromycin (ZITHROMAX) 250 mg tablet Take 2 tablets today, then take 1 tablet daily 21   Deyvi Benitez NP   albuterol sulfate (PROAIR RESPICLICK) 90 mcg/actuation breath activated inhaler Take 2 Puffs by inhalation four (4) times daily as needed for Cough (SOB, wheezing). 21   Deyvi Benitez NP   EPINEPHrine (EPIPEN 2-SOLITARIO) 0.3 mg/0.3 mL (1:1,000) injection 0.3 mL by IntraMUSCular route once as needed for 1 dose. 13   Lizet Hogan MD   diazepam (VALIUM) 5 mg tablet Take 1 Tab by mouth every eight (8) hours as needed (spasm). 12   LIBERTY Headley       Allergies/Social/Family History:     No Known Allergies   Social History     Tobacco Use    Smoking status: Former Smoker     Packs/day: 1.00     Years: 10.00     Pack years: 10.00    Smokeless tobacco: Never Used   Substance Use Topics    Alcohol use: Yes     Alcohol/week: 3.3 standard drinks     Types: 4 Cans of beer per week      No family history on file.     Review of Systems:     As per HPI    Objective:   Vital Signs:  Visit Vitals  BP (!) 156/109   Pulse 67   Temp 97.8 °F (36.6 °C)   Resp (!) 32   Ht 5' 10\" (1.778 m)   Wt 106.1 kg (234 lb)   SpO2 91%   BMI 33.58 kg/m²    O2 Flow Rate (L/min): 60 l/min O2 Device: BIPAP Temp (24hrs), Av.8 °F (36.6 °C), Min:97.7 °F (36.5 °C), Max:98 °F (36.7 °C)           Intake/Output:     Intake/Output Summary (Last 24 hours) at 2021 0758  Last data filed at 2021 0700  Gross per 24 hour   Intake 7687.72 ml   Output 5550 ml   Net 2137.72 ml       Physical Exam:    General:Awake, alert, on BiPAP, moderate dyspnea  Eye: PERRLA  Neurologic:  oriented, grossly non-focal  Neck:  Supple, no JVD  Lungs:CTAB, diminished bases, on BiPAP  Heart:  RRR, 2+ pulses, no edema  Abdomen:  Soft, nontender, nondistended  Skin:c/d/i    LABS AND  DATA: Personally reviewed  Recent Labs     08/24/21  0446   WBC 14.3*   HGB 15.6   HCT 46.5        Recent Labs     08/24/21  0446 08/23/21  0505   * 129*   K 4.8 5.3*    104   CO2 23 20*   BUN 24* 22*   CREA 0.89 0.99   * 192*   CA 8.1* 8.3*   MG 2.6* 2.8*   PHOS 3.2 3.4     Recent Labs     08/24/21  0446 08/23/21  0505    136*   TP 5.9* 6.3*   ALB 3.3* 3.2*   GLOB 2.6 3.1     No results for input(s): INR, PTP, APTT, INREXT, INREXT in the last 72 hours. No results for input(s): PHI, PCO2I, PO2I, FIO2I in the last 72 hours. No results for input(s): CPK, CKMB, TROIQ, BNPP in the last 72 hours. BiPAP 100% 16/8    MEDS: Reviewed    Chest X-Ray: personally reviewed and report checked    8/20: CXR: Significantly increased interstitial and airspace disease may represent worsening COVID-19 pneumonia and/or superimposed pulmonary edema. Assessment:     Acute hypoxic respiratory failure secondary to COVID-19 Pneumonia: Admitted to the hospital 8/14. Initial symptoms 8/8. Unvaccinated. He was started on dexamethasone, remdesivir, and tocilizumab. He was admitted to the ICU on 8/15 for worsening hypoxia and required BiPAP.  Initially went to floor on 8/18, but back to ICU on 8/22 for worsening hypoxia.  - Continue empiric Cefepime 8/22 - present  - MRSA swab NEG - stopped Vanc  - BLC pending  - Wean BiPAP as tolerated  - OOB to chair as tolerated   - Continue methylpred  - CXR  - Lovenox ppx   - Diuresis  - D-dimer/CRP stop lab draws - DVT scan NEG & has received Toci      Anxiety, situational/agitation/BiPAP associated discomfort:   -Precedex    NABEEL:    - Trend BMP  - Diuresis    Moderate protein calorie malnutrition:  - PPN  - Cannot come off BiPAP for diet  - NST consulted    Acute deconditioning c/b obesity:  - OOB to chair as tolerated for V/Q and pulmonary offloading   - Able to get self from bed to chair    Multidisciplinary Rounds Completed: Yes    ABCDEF Bundle/Checklist  Pain Medications: None  Target RASS: 0 - Alert & Calm - Spontaneously pays attention to caregiver  Sedation Medications: Precedex  CAM-ICU:  Negative  Mobility: Fair  PT/OT: PT consulted and on board and OT consulted and on board   Restraints: None needed at this time  Discussed Plan of Care (goals of care): Yes  Addressed Code Status: Full Code    CARDIOVASCULAR  Cardiac Gtts: None  SBP Goal of: > 90 mmHg  MAP Goal of: > 65 mmHg  Transfusion Trigger (Hgb): <7 g/dL    RESPIRATORY  Vent Goals:   Optimize PEEP/Ventilation/Oxygenation  DVT Prophylaxis (if no, list reason): Lovenox   SPO2 Goal: > 92%  Pulmonary toilet: Duo-Nebs     GI/  Brownlee Catheter Present: No  GI Prophylaxis: N  Nutrition: Yes PPN  IVFs: None  Bowel Movement: Yes  Bowel Regimen: Docusate (Colace)  Insulin: N    T/L/D  Tubes: None  Lines: None  Drains: None    SPECIAL EQUIPMENT  None    CRITICAL CARE CONSULTANT NOTE  I had a face to face encounter with the patient, reviewed and interpreted patient data including clinical events, labs, images, vital signs, I/O's, and examined patient. I have discussed the case and the plan and management of the patient's care with the consulting services, the bedside nurses and the respiratory therapist.      NOTE OF PERSONAL INVOLVEMENT IN CARE   This patient has a high probability of imminent, clinically significant deterioration, which requires the highest level of preparedness to intervene urgently.  I participated in the decision-making and personally managed or directed the management of the following life and organ supporting interventions that required my frequent assessment to treat or prevent imminent deterioration. I personally spent 90 minutes of critical care time. This is time spent at this critically ill patient's bedside actively involved in patient care as well as the coordination of care and discussions with the patient's family. This does not include any procedural time which has been billed separately.     Ronn Sawyer,    Staff Intensivist/Anesthesiologist  Critical Care Medicine

## 2021-08-25 NOTE — PROGRESS NOTES
08/25/21 0836   Vent Settings   FIO2 (%) 100 %   SpO2/FIO2 Ratio 90   CMV Rate Set 16   Back-Up Rate 16   Vt Set (ml) 400 ml   PEEP/VENT (cm H2O) 15 cm H20   Insp Time (sec) 0.8 sec   Insp Flow (l/min) 50 l/min   Insp Rise Time % 50 %   Pressure Trigger 3.5   Vent Method/Mode   Ventilation Method Conventional   Ventilator Mode Assist control;VC+   $$ Ventilator Subsequent Subsequent Vent Day   All ventilation changes approved by MD

## 2021-08-25 NOTE — PROGRESS NOTES
0600: pt intubated at this time by Annabel Nogueira NP, 8.0, 23@ teeth, pt pre oxygenated with 100% oxygen, tube verified with love, BBS, + color change, CXR, placed on ventilator, settings documented.

## 2021-08-25 NOTE — ADT AUTH CERT NOTES
Viral Illness, Acute - Care Day 10 (8/23/2021) by Aron Puentes RN       Review Entered Review Status   8/24/2021 16:18 Completed      Criteria Review      Care Day: 10 Care Date: 8/23/2021 Level of Care: Intermediate Care    Guideline Day 2    Clinical Status    (X) * Hypotension absent    8/24/2021 16:18:34 EDT by Michael Pike      96.6   55   40   114/103   80% bipap    ( ) * No requirement for mechanical ventilation    8/24/2021 16:18:34 EDT by Michael Pike      cpap    ( ) * Oxygenation at baseline or improved    (X) * Mental status at baseline    Activity    (X) Advance activity as tolerated    Routes    (X) * Oral hydration    ( ) Oral or IV medications    8/24/2021 16:18:34 EDT by Michael Pike      Tylenol 650mg po q6 prn x    tessalon 200mg po Tid  lovenox 40mg q12   mucinex 1200mg po bid   solumedrol 40mg iv q6       maxipime 2g iv q8   precedex titrated gtt   vanc 2500 iv once   TPN    (X) Usual diet    Interventions    (X) Possible isolation    (X) Pulse oximetry    (X) Possible oxygen    8/24/2021 16:18:34 EDT by Michael Pike      bipap    Medications    (X) Possible antibiotic (eg, for bacterial coinfection or superinfection)    8/24/2021 16:18:34 EDT by Michael Pike      maxipime 2g iv q8   vanc 1250iv q12    (X) Possible DVT prophylaxis    8/24/2021 16:18:34 EDT by Michael Pike      lovenox 40mg q12    * Milestone   Additional Notes   8/23/21 inpt   ICU   96.6   55   40   114/103   80% bipap   D-dimer: 14.30 (H)   Sodium: 129 (L)   Potassium: 5.3 (H)   CO2: 20 (L)   Anion gap: 5   Glucose: 192 (H)   BUN: 22 (H)   Creatinine: 0.99   BUN/Creatinine ratio: 22 (H)   Calcium: 8.3 (L)   Magnesium: 2.8 (H)   Protein, total: 6.3 (L)   Albumin: 3.2 (L)   A-G Ratio: 1.0 (L)   ALT: 128 (H)   AST: 65 (H)   Alk.  phosphatase: 136 (H)   C-Reactive protein: <0.29   Meds:   Tylenol 650mg po q6 prn x    tessalon 200mg po Tid  lovenox 40mg q12   mucinex 1200mg po bid   solumedrol 40mg iv q6       maxipime 2g iv q8   precedex titrated gtt   vanc 1250iv q12    TPN   lipids   Orders:  reg diet   strict I&O   incentive            Critical care note   Assessment:       Acute hypoxic respiratory failure secondary to COVID-19 Pneumonia: Admitted to the hospital 8/14. Initial symptoms 8/8. Unvaccinated. He was started on dexamethasone, remdesivir, and tocilizumab. He was admitted to the ICU on 8/15 for worsening hypoxia and required BiPAP. Initially went to floor on 8/18, but back to ICU on 8/22 for worsening hypoxia.   - Continue empiric vanc/cefepime 8/22 - present   - MRSA swab pending   - BLC pending   - Wean BiPAP as tolerated   - OOB to chair as tolerated for V/Q   - Continue methylpred (completed dexamethasone, changed to methylpred)   - Repeating CXR   - Lovenox ppx - sending fibrinogen; DVT exam and will make further determinations on full dose AC       Anxiety, situational/agitation/BiPAP associated discomfort:    -Precedex       NABEEL:     - Trend BMP - this am specimen demonstrated hemolysis and hyponatremia concerning for spurious values; may diurese, but need to re-eval BMP       Moderate protein calorie malnutrition:   - PPN   - Cannot come off BiPAP for diet   - NST consulted       Acute deconditioning c/b obesity:   - OOB to chair as tolerated for V/Q and pulmonary offloading    - Able to get self from bed to chair       Multidisciplinary Rounds Completed:  Yes       ABCDEF Bundle/Checklist   Pain Medications: None   Target RASS: 0 - Alert & Calm - Spontaneously pays attention to caregiver   Sedation Medications: Precedex   CAM-ICU:  Negative   Mobility: Fair   PT/OT: PT consulted and on board and OT consulted and on board    Restraints: None needed at this time   Discussed Plan of Care (goals of care):  Yes   Addressed Code Status: Full Code       CARDIOVASCULAR   Cardiac Gtts: None   SBP Goal of: > 90 mmHg   MAP Goal of: > 65 mmHg   Transfusion Trigger (Hgb): <7 g/dL       RESPIRATORY   Vent Goals:    Optimize PEEP/Ventilation/Oxygenation   DVT Prophylaxis (if no, list reason): Lovenox    SPO2 Goal: > 92%   Pulmonary toilet: Duo-Nebs        GI/   Brownlee Catheter Present: No   GI Prophylaxis: N   Nutrition: Yes PPN   IVFs: None   Bowel Movement: Yes   Bowel Regimen: Docusate (Colace)   Insulin: N       ANTIBIOTICS   Antibiotics:   Vanc/cef       T/L/D   Tubes: None   Lines: None   Drains: None       SPECIAL EQUIPMENT   None       DISPOSITION   Transfer to IMU            Viral Illness, Acute - Care Day 9 (8/22/2021) by Yasmine Medina RN       Review Entered Review Status   8/24/2021 16:06 Completed      Criteria Review      Care Day: 9 Care Date: 8/22/2021 Level of Care: Intermediate Care    Guideline Day 2    Clinical Status    (X) * Hypotension absent    8/24/2021 16:06:22 EDT by Alea Collazo      98. 8   81  34   163/77   87% bipap    ( ) * No requirement for mechanical ventilation    8/24/2021 16:06:22 EDT by Alea Collazo      RESPIRATORY  Vent Goals:   Optimize PEEP/Ventilation/Oxygenation  DVT Prophylaxis (if no, list reason): Lovenox   SPO2 Goal: > 92%  Pulmonary toilet: Duo-Nebs    ( ) * Oxygenation at baseline or improved    (X) * Mental status at baseline    Activity    (X) Advance activity as tolerated    Routes    (X) * Oral hydration    (X) Usual diet    8/24/2021 16:06:22 EDT by Alea Collazo      tpn    Interventions    (X) Possible isolation    (X) Pulse oximetry    (X) Possible oxygen    Medications    (X) Possible antibiotic (eg, for bacterial coinfection or superinfection)    8/24/2021 16:06:22 EDT by Alea Collazo      maxipime 2g iv q8   vanc 2500 iv once    (X) Possible DVT prophylaxis    8/24/2021 16:06:22 EDT by Alea Collazo      lovenox 40mg q12    * Milestone   Additional Notes   8/22/21 inpt   ICU   98.8   81  34   163/77   87% bipap   Sodium: 128 (L)   Potassium: 4.9   Anion gap: 4 (L)   Glucose: 149 (H)   BUN: 22 (H)   Creatinine: 0.99   BUN/Creatinine ratio: 22 (H)   Calcium: 8.6   Magnesium: 2.6 (H)   Bilirubin, total: 1.2 (H)   ALT: 160 (H)   AST: 107 (H)   Alk. phosphatase: 151 (H)   C-Reactive protein: <0.29   Procalcitonin: 1.88      Meds:   Tylenol 650mg po q6 prn x 1   tessalon 200mg po Tid  lovenox 40mg q12   mucinex 1200mg po bid   solumedrol 40mg iv q6       maxipime 2g iv q8   precedex titrated gtt   vanc 2500 iv once   TPN   Orders:  reg diet   strict I&O   incentive         Hospitalist note   Assessment and Plan:   #Severe COVID-19 pneumonia   Superimposed bacterial componenet   #Acute hypoxic respiratory failure, on continuous BiPAP, without much improvement   #Severe coughing spells with worsened SOB intermittently   #NABEEL-improving   #Obesity, BMI 34.65   #Acute deconditioning due to COVID-19   Mild hyponatremia   Transaminitis -improving       -Transfer to ICU on 8/15/2021. Angelina Allen out of ICU on 8/19/2021 to [facility]   Procal -1.28 today- add vancomycin and cefepime, sputum culture if possible.   -Continue BiPAP support as patient is not able to tolerate taking it off even for short period of time even with high flow, failed high flow today again   -S/p Actemra 8/14   -S/p remdesivir   -on solumedrol    -daily labs        -Continue supportive care: vitamin C, Tessalon Perles as needed, zinc,  Mucinex scheduled, as needed albuterol inhaler, as needed antitussive   -repeat CXR tomorrow   -Consider diuresis as tolerated with caution given NABEEL   - pulmonology following   -Lovenox per COVID-19 DVT prophylaxis protocol   -elevated D dimer could be related to underlying infection, CTA chest 8/14- no PE, has been on lovenox prophylaxis since admission       Will consider TPN based on further clinical course       CODE STATUS: Full code   DVT prophylaxis: Lovenox SQ              Critical care note   Assessment:       Acute hypoxic respiratory failure secondary to COVID-19 Pneumonia: Admitted to the hospital 8/14. Initial symptoms 8/8. Unvaccinated.  He was started on dexamethasone, remdesivir, and tocilizumab. He was admitted to the ICU on 8/15 for worsening hypoxia and required BiPAP. Initially went to floor on 8/18, but back to ICU on 8/22 for worsening hypoxia.   - Continue empiric vanc/cefepime 8/22 - present   - MRSA swab pending   - BLC pending   - Wean BiPAP as tolerated   - OOB to chair as tolerated for V/Q   - Continue methylpred (completed dexamethasone, changed to methylpred)   - Repeating CXR   - Zinc   - Vitamin C   - Lovenox ppx - sending fibrinogen; DVT exam and will make further determinations on full dose AC       Anxiety, situational/agitation/BiPAP associated discomfort: Precedex       NABEEL:     - Repeat BMP - this am specimen demonstrated hemolysis and hyponatremia concerning for spurious values; may diurese, but need to re-eval BMP       Moderate protein calorie malnutrition:   - PPN   - Cannot come off BiPAP for diet   - NST consulted       Acute deconditioning c/b obesity:   - OOB to chair as tolerated for V/Q and pulmonary offloading    - Able to get self from bed to chair       Multidisciplinary Rounds Completed:  Yes       ABCDEF Bundle/Checklist   Pain Medications: None   Target RASS: 0 - Alert & Calm - Spontaneously pays attention to caregiver   Sedation Medications: None   CAM-ICU:  Negative   Mobility: Fair   PT/OT: PT consulted and on board and OT consulted and on board    Restraints: None needed at this time   Discussed Plan of Care (goals of care):  Yes   Addressed Code Status: Full Code       CARDIOVASCULAR   Cardiac Gtts: None   SBP Goal of: > 90 mmHg   MAP Goal of: > 65 mmHg   Transfusion Trigger (Hgb): <7 g/dL       RESPIRATORY   Vent Goals:    Optimize PEEP/Ventilation/Oxygenation   DVT Prophylaxis (if no, list reason): Lovenox    SPO2 Goal: > 92%   Pulmonary toilet: Duo-Nebs        GI/   Brownlee Catheter Present: No   GI Prophylaxis: N   Nutrition: Yes PPN   IVFs: None   Bowel Movement: Yes   Bowel Regimen: Docusate (Colace)   Insulin: N       ANTIBIOTICS   Antibiotics: Vanc/cef       T/L/D   Tubes: None   Lines: None   Drains: None       SPECIAL EQUIPMENT   None       DISPOSITION   Transfer to IMU             Viral Illness, Acute - Care Day 8 (8/21/2021) by Janine Carpio RN       Review Entered Review Status   8/24/2021 15:52 Completed      Criteria Review      Care Day: 8 Care Date: 8/21/2021 Level of Care: Intermediate Care    Guideline Day 2    Clinical Status    (X) * Hypotension absent    8/24/2021 15:52:23 EDT by Bennett Galvez      98. 4    93   19    104/70   91% bipap 60l    (X) * No requirement for mechanical ventilation    8/24/2021 15:52:23 EDT by Bennett Galvez      on bipap    ( ) * Oxygenation at baseline or improved    (X) * Mental status at baseline    Activity    (X) Advance activity as tolerated    Routes    (X) * Oral hydration    ( ) Oral or IV medications    8/24/2021 15:52:23 EDT by Bennett Galvez      Tylenol 650mg po q6 prn x 1   tessalon 200mg po Tid  lovenox 40mg q12   mucinex 1200mg po bid   solumedrol 40mg iv q6     TPN    (X) Usual diet    8/24/2021 15:52:23 EDT by Bennett Galvez      reg diet    Interventions    (X) Possible isolation    (X) Pulse oximetry    (X) Possible oxygen    Medications    (X) Possible DVT prophylaxis    8/24/2021 15:52:23 EDT by Bennett Galvez      lovenox 40mg q12    * Milestone   Additional Notes   8/21/21 inpt   intermediate care   98.4    93   19    104/70   91% bipap 60l       D-dimer: 4.67 (H)   Phosphorus: 3.3   Magnesium: 2.5 (H)   C-Reactive protein: <0.29      Meds:   Tylenol 650mg po q6 prn x 1   tessalon 200mg po Tid  lovenox 40mg q12   mucinex 1200mg po bid   solumedrol 40mg iv q6     TPN      Orders:  reg diet   strict I&O   incentive   OOB to chair      Hospitalist note   Assessment and Plan:   #Severe COVID-19 pneumonia   #Acute hypoxic respiratory failure, on continuous BiPAP, without much improvement   #Severe coughing spells with worsened SOB intermittently   #NABEEL-improving   #Obesity, BMI 34.65   #Acute deconditioning due to COVID-19       -Transfer to ICU on 8/15/2021. Amy Santana out of ICU on 8/19/2021 to IMCU   -Continue BiPAP support as patient is not able to tolerate taking it off even for short period of time even with high flow   -S/p Actemra 8/14   -S/p remdesivir 8/14-8/19   -on solumedrol    -CBC, CMP, CRP, procalcitonin, D-dimer monitoring   Sputum cultures -unable to obtain       -Continue supportive care: vitamin C, Tessalon Perles as needed, zinc,  Mucinex scheduled, as needed albuterol inhaler, as needed antitussive   -repeat CXR again suggests worsening infiltrates   -one dose of 20 mg lasix today   -Consider diuresis as tolerated with caution given NABEEL   - pulmonology consultated   -Lovenox per COVID-19 DVT prophylaxis protocol       CODE STATUS: Full code   DVT prophylaxis: Lovenox SQ                                 Viral Illness, Acute - Care Day 7 (8/20/2021) by Adry Ghosh RN       Review Entered Review Status   8/20/2021 14:58 Completed      Criteria Review      Care Day: 7 Care Date: 8/20/2021 Level of Care: Intermediate Care    Guideline Day 2    Level Of Care    ( ) Floor    8/20/2021 14:58:01 EDT by Marla JETT INTERMEDIATE CARE    Clinical Status    (X) * Hypotension absent    8/20/2021 14:58:01 EDT by Rhianna Samuel      /111    (X) * No requirement for mechanical ventilation    ( ) * Oxygenation at baseline or improved    8/20/2021 14:58:01 EDT by Rhianna Samuel      RESP 25 96% BIPAP/ HI FLOW 60 LPM    (X) * Mental status at baseline    8/20/2021 14:58:01 EDT by Rhianna Samuel      ALERT,ORIENTED X3    Activity    (X) Advance activity as tolerated    Routes    (X) * Oral hydration    8/20/2021 14:58:01 EDT by Rhianna Hussein      REGULAR DIET    (X) Usual diet    8/20/2021 14:58:01 EDT by Rhianna Hussein      REGULAR DIET    Interventions    (X) Possible isolation    8/20/2021 14:58:01 EDT by Alexus Cannon DROPLET PLUS ISOLATION    (X) Pulse oximetry    8/20/2021 14:58:01 EDT by Rhianna Flores      CONTINUOUS PULSE OX    (X) Possible oxygen    8/20/2021 14:58:01 EDT by Rhianna Flores      25 96% BIPAP/ HI FLOW 60 LPM    Medications    (X) Possible DVT prophylaxis    8/20/2021 14:58:01 EDT by Rhianna Flores      Lovenox 40mg sc q12    * Milestone   Additional Notes   8/20/2021   LOC IP INTERMEDIATE CARE   VS 98.8 90 128/111 25 96% BIPAP/ HI FLOW 60 LPM    LABS     WBC: 16.3 (H)   NRBC: 0.0   RBC: 5.80 (H)   HGB: 16.8   HCT: 51.0 (H)   CXR IMPRESSION   1. COVID-19 pneumonia. 2. Significantly increased interstitial and airspace disease may represent   worsening COVID-19 pneumonia and/or superimposed pulmonary edema. MEDS   Vitamin c 500mg po bid   Tessalon 200mg po tid   Vitamin d3 2000 units po qday   Colace 100mg po qday   Lovenox 40mg sc q12   Mucinex 1,200mg po bid   SSI x1   Solu-medrol 40mg iv q6   Senokot 17.2mg po qday   Zinc 1 cap po qday   Lasix 20mg iv x1      ATTENDING NOTE   Chief complain:  Worsening SOB        History of present illness on admission 8/14/21:   Matt Jaquez is a 51 y.o. male who presents with NO PMHx presents to hospital with SOB that has been worsening over past 2 days. Patient states that his whole family has COVID, starting with his daughter. Patient states that he started to have body aches, fever, since last Sunday on 8/8. Pt was seen in ER on 8/12 at which time he had pneumonia but presume he was not hypoxic and sent home on Prednisone and Azithromycin. Patient states after going home he has progressively worsened. Patient now having worsening cough which is dry and SOB. Patient states he has been having fever of 102 at home. Patient is NOT vaccinated against COVID-19. Patient states he and his son were planning to get COVID Vaccine soon.  No n/v diarrhea, abdominal pain,dysuria, weakness, numbness.       Reason for ICU Admission on 8/15/21: COVID 19 PNA - Progressively getting worse        HPI on ICU admission:   (from H/P as patient is unable to provide history as he is tachypnic)    Brigido Anguiano is a 51 y.o. male who presents with NO PMHx presents to hospital with SOB that has been worsening over past 2 days. Patient states that his whole family has COVID, starting with his daughter. Patient states that he started to have body aches, fever, since last Sunday on 8/8. Pt was seen in ER on 8/12 at which time he had pneumonia but presume he was not hypoxic and sent home on Prednisone and Azithromycin. Patient states after going home he has progressively worsened. Patient now having worsening cough which is dry and SOB. Patient states he has been having fever of 102 at home. Patient is NOT vaccinated against COVID-19. Patient states he and his son were planning to get COVID Vaccine soon. No n/v diarrhea, abdominal pain,dysuria, weakness, numbness.       ICU consulted for increasing oxygen requirements.    Assessment and Plan:   #Severe COVID-19 pneumonia   #Acute hypoxic respiratory failure, on continuous BiPAP, without much improvement   #Severe coughing spells with worsened SOB intermittently   #NABEEL-improving   #Obesity, BMI 34.65   #Acute deconditioning due to COVID-19       -Transfer to ICU on 8/15/2021. Mell Childress out of ICU on 8/19/2021 to IMCU   -Continue BiPAP support as patient is not able to tolerate taking it off even for short period of time even with high flow   -S/p Actemra 8/14   -S/p remdesivir 8/14-8/19   -on solumedrol    -CBC, CMP, CRP, procalcitonin, D-dimer monitoring   Sputum cultures        -Continue supportive care: vitamin C, Tessalon Perles as needed, zinc, add Mucinex scheduled, as needed albuterol inhaler, as needed antitussive   -repeat CXR today suggests worsening infiltrates   -one dose of 20 mg lasix today   -Consider diuresis as tolerated with caution given NABEEL   - pulmonology consultated   -Lovenox per COVID-19 DVT prophylaxis protocol     CODE STATUS: Full code   DVT prophylaxis: Lovenox SQ       CRITICAL CARE ATTESTATION:   I had a face to face encounter with the patient, reviewed and interpreted patient data including clinical events, labs, images, vital signs, I/O's, and examined patient.  I have discussed the case and the plan and management of the patient's care with the consulting services, the bedside nurses and necessary ancillary providers.         NOTE OF PERSONAL INVOLVEMENT IN CARE    This patient has a high probability of imminent, clinically significant deterioration, which requires the highest level of preparedness to intervene urgently. I participated in the decision-making and personally managed or directed the management of the following life and organ supporting interventions that required my frequent assessment to treat or prevent imminent deterioration.       I personally spent 40 minutes of critical care time.  This is time spent at this critically ill patient's bedside actively involved in patient care as well as the coordination of care and discussions with the patient's family.  This does not include any procedural time which has been billed separately.    General:          Alert, sitting up in chair, on BiPAP, moderate respiratory distress intermittently with coughing spells    HEENT:           Atraumatic, MMM-limited exam, PERRLA.  BiPAP mask in place            Neck:               Supple, symmetrical,  thyroid: non tender   Lungs:              decreased breath sounds , No wheezing or crackles   Heart:              Regular  rhythm,   normal rate,  No  murmur   No edema   Abdomen:       Soft, non-tender. Not distended.  Bowel sounds normal   Extremities:     1+ LE edema   Skin:                Not pale.  Not Jaundiced  No rashes    Psych:             Not anxious or agitated. Neurologic:      Alert, moves all extremities, oriented X 3.       PULMONARY DISEASE    IMPRESSION:   · Acute hypoxic respiratory failure   · COVID-19 pneumonia, he has received remdesivir and Tocilizumab, steroids are ongoing   · ARDS   · NABEEL       RECOMMENDATIONS:   · O2 - currently on BiPAP   · On steroids   · Monitor creatinine   · Diuretics as needed   · Monitor procalcitonin and need for antibiotic coverage as indicated   · At risk for post ARDS fibrotic disease   · DVT prophylaxis   · High risk for further decompensation-continue close monitoring       Subjective:   8/20   Patient was evaluated earlier today. [facility] was sitting in a chair on BiPAP, uncomfortable with incessant coughing.  He was transferred out of the ICU and has completed initial courses with remdesivir as well as Tocilizumab. [facility] has received diuretic today.  Chest x-ray shows adequately inflated lungs with findings of ARDS. [facility] is at risk for postinflammatory fibrosis.         Viral Illness, Acute - Care Day 6 (8/19/2021) by Madison Flynn RN       Review Entered Review Status   8/19/2021 13:54 Completed      Criteria Review      Care Day: 6 Care Date: 8/19/2021 Level of Care: Intermediate Care    Guideline Day 2    Level Of Care    ( ) Floor    8/19/2021 13:54:06 EDT by Jenna rivas intermediate care    Clinical Status    (X) * Hypotension absent    8/19/2021 13:54:06 EDT by Rhianna Miller      /97    (X) * No requirement for mechanical ventilation    8/19/2021 13:54:06 EDT by Rhianna Miller      N/A    ( ) * Oxygenation at baseline or improved    8/19/2021 13:54:06 EDT by Rhianna Samuel      REMAINS ON BIPAP    (X) * Mental status at baseline    8/19/2021 13:54:06 EDT by Rhianna Samuel      ALERT,ORIENTED    Activity    (X) Advance activity as tolerated    8/19/2021 13:54:06 EDT by Rhianna Samuel      ABLE TO GET SELF UP TO CHAIR    Routes    (X) * Oral hydration    8/19/2021 13:54:06 EDT by Rhianna Miller      REGULAR DIET    (X) Usual diet    8/19/2021 13:54:06 EDT by Rhianna Miller      REGULAR DIET Interventions    (X) Possible isolation    8/19/2021 13:54:06 EDT by Pradeep Brady      DROPLET PLUS ISOLATION    (X) Pulse oximetry    (X) Possible oxygen    8/19/2021 13:54:06 EDT by Pradeep Brady      BIPAP    Medications    (X) Possible DVT prophylaxis    8/19/2021 13:54:06 EDT by Rhianna Samuel      LOVENOX SC QDAY    * Milestone   Additional Notes   8/19/2021   LOC IP ICU   VS  98.4 78 147/97 16 100% bipap   LABS      Anion gap: 3 (L)   Glucose: 106 (H)   BUN: 26 (H)   Creatinine: 1.13   BUN/Creatinine ratio: 23 (H)   Calcium: 8.4 (L)   Phosphorus: 3.5   Magnesium: 2.5 (H)   GFR est non-AA: >60   GFR est AA: >60   Bilirubin, total: 1.0   Protein, total: 6.7   Albumin: 3.6   Globulin: 3.1   A-G Ratio: 1.2   ALT: 139 (H)   AST: 136 (H)   Alk. phosphatase: 84   C-Reactive protein: 0.59      MEDS   Vitamin c 500mg po bid   Tessalon 200mg po tid   Vitamin d3 2000 units po qday   Decadron 6mg iv qday      Lovenox 40mg sc q12   Zinc 1 cap po qday         ATTENDING NOTE   Mr Juan C Aj is a 52 yo male who presented to the ED on 8/14 with no prior PMH with SOB x2 days. [facility] had developed body aches and fever as of 8/8.  He was diagnosed with COVID-19 pneumonia.  He was not vaccinated. He was started on dexamethasone, remdesivir, and tocilizumab. He was admitted to the ICU on 8/15 for worsening hypoxia and required BiPAP.  Upon exam this am, he was sitting in the chair, on BiPAP 80% 16/8 for several days. Able to get self from chair to bed today. Physical Exam:       General:Awake, alert, on BiPAP, moderate dypnea   Eye: PERRLA   Neurologic:  oriented, grossly non-focal   Neck:  Supple, no JVD   Lungs:CTAB, diminished bases, on BiPAP   Heart:  RRR, 2+ pulses, 1+ edema of extremities   Abdomen:  Soft, nontender, nondistended   Skin:c/d/i   Validated 8/19        Acute hypoxic respiratory failure secondary to COVID-19 Pneumonia: Admitted to the hospital 8/14. Initial symptoms 8/8. Unvaccinated.  He was started on dexamethasone, remdesivir, and tocilizumab. He was admitted to the ICU on 8/15 for worsening hypoxia and required BiPAP. - Holding on diuresis due to NABEEL   - Wean BiPAP as tolerated   - OOB to chair as tolerated for V/Q   - Dexamethasone   - Zinc   - Vitamin C   - Lovenox       NABEEL:     - Holding on diuresis due to NABEEL, slightly improving today   - Daily BMP       Acute deconditioning c/b obesity:   - OOB to chair as tolerated for V/Q and pulmonary offloading    - Able to get self from bed to chair       Multidisciplinary Rounds Completed:  Yes       ABCDEF Bundle/Checklist   Pain Medications: None   Target RASS: 0 - Alert & Calm - Spontaneously pays attention to caregiver   Sedation Medications: None   CAM-ICU:  Negative   Mobility: Fair   PT/OT: PT consulted and on board and OT consulted and on board    Restraints: None needed at this time   Discussed Plan of Care (goals of care):  Yes   Addressed Code Status: Full Code       CARDIOVASCULAR   Cardiac Gtts: None   SBP Goal of: > 90 mmHg   MAP Goal of: > 65 mmHg   Transfusion Trigger (Hgb): <7 g/dL       RESPIRATORY   Vent Goals:    Optimize PEEP/Ventilation/Oxygenation   DVT Prophylaxis (if no, list reason): Lovenox    SPO2 Goal: > 92%   Pulmonary toilet: Duo-Nebs        GI/   Brownlee Catheter Present: No   GI Prophylaxis: Protonix (pantoprazole)    Nutrition: Yes Diet   IVFs: None   Bowel Movement: Yes   Bowel Regimen: Docusate (Colace)   Insulin: N       ANTIBIOTICS   Antibiotics:   N       T/L/D   Tubes: None   Lines: None   Drains: None       SPECIAL EQUIPMENT   None       DISPOSITION   Transfer to IMU

## 2021-08-25 NOTE — PROGRESS NOTES
[facility]    ICU TEAM Progress Note    Name: Bar Adan   : 1975   MRN: [id number]   Date: 2021      I  Subjective:   Progress Note: 2021      Reason for ICU Admission:  Acute hypoxic respiratory failure due to COVID-19 pneumonia    Interval history:  Mr Roc Romo is a 50 yo male who presented to the ED on  with no prior PMH with SOB x2 days. He had developed body aches and fever as of . He was diagnosed with COVID-19 pneumonia. He was not vaccinated. He was started on dexamethasone, remdesivir -, and tocilizumab . He was admitted to the ICU on 8/15 for worsening hypoxia and required BiPAP. He remained in the ICU from 8/15- on BiPAP. He weaned from 100% to 80%  and was able to get himself from bed to chair. He was transferred to the [facility] on . FIO2 further decreased to 70% on .      Hi Flow was attempted , but he quickly desaturated to 72%. He was placed back on BiPAP at 80% FIO2 (IPAP/EPAP not listed). CXR on  noted to have increased infiltrates. He was able to take a break from BiPAP on  to eat, transitioning from HiFlow, but again became hypoxic, needing to go back to BiPAP.      As of , he reported progressively worsening cough, which seemed more intense when he would take breaks from the BiPAP and attempt to eat. This was corroborated by staff. During these coughing episodes, sats would decrease as low as 50s. He had been increased to 100% FIo2 on BiPAP  at the time of my exam.  Procal was elevated to 1.88. WBC inc to 16.3. He had been started on empiric vanc/cefepime.       Overnight Events:   : Intubated early morning on the . Sedated.     Active Problem List:     Problem List  Never Reviewed        Codes Class    Severe sepsis ([facility] [address] ICD-10-CM: A41.9, R65.20  ICD-9-CM: 038.9, 995.92         Pneumonia due to COVID-19 virus ICD-10-CM: U07.1, J12.82  ICD-9-CM: 480.8, 079.89         ACL tear ICD-10-CM: B52.440J  ICD-9-CM: 844.2               Past Medical History:      has no past medical history of Abuse, Anemia NEC, Calculus of kidney, Congestive heart failure, unspecified, Contact dermatitis and other eczema, due to unspecified cause, Depression, Headache(784.0), Hypercholesterolemia, Psychotic disorder, or Trauma. Past Surgical History:      has a past surgical history that includes hx heent and hx orthopaedic. Home Medications:     Prior to Admission medications    Medication Sig Start Date End Date Taking? Authorizing Provider   azithromycin (ZITHROMAX) 250 mg tablet Take 2 tablets today, then take 1 tablet daily 21   Claudean Comer, NP   albuterol sulfate (PROAIR RESPICLICK) 90 mcg/actuation breath activated inhaler Take 2 Puffs by inhalation four (4) times daily as needed for Cough (SOB, wheezing). 21   Claudean Comer, NP   EPINEPHrine (EPIPEN 2-SOLITARIO) 0.3 mg/0.3 mL (1:1,000) injection 0.3 mL by IntraMUSCular route once as needed for 1 dose. 13   Giselle Warren MD   diazepam (VALIUM) 5 mg tablet Take 1 Tab by mouth every eight (8) hours as needed (spasm). 12   LIBERTY Waters       Allergies/Social/Family History:     No Known Allergies   Social History     Tobacco Use    Smoking status: Former Smoker     Packs/day: 1.00     Years: 10.00     Pack years: 10.00    Smokeless tobacco: Never Used   Substance Use Topics    Alcohol use: Yes     Alcohol/week: 3.3 standard drinks     Types: 4 Cans of beer per week      No family history on file.     Review of Systems:     Not able to obtain due to patient medical condition    Objective:   Vital Signs:  Visit Vitals  BP (!) 155/124   Pulse 91   Temp 97.8 °F (36.6 °C)   Resp (!) 38   Ht 5' 10\" (1.778 m)   Wt 106.1 kg (234 lb)   SpO2 95%   BMI 33.58 kg/m²    O2 Flow Rate (L/min): 60 l/min O2 Device: BIPAP Temp (24hrs), Av °F (36.7 °C), Min:97.7 °F (36.5 °C), Max:98.6 °F (37 °C)           Intake/Output:     Intake/Output Summary (Last 24 hours) at 8/25/2021 0810  Last data filed at 8/25/2021 0104  Gross per 24 hour   Intake 3331.55 ml   Output 7100 ml   Net -3768.45 ml       Physical Exam:    General:   Sedated intubated on mechanical ventilation   Eyes:  Sclera anicteric. Pupils equally round and reactive to light. Mouth/Throat: Mucous membranes normal, oral pharynx clear, ET tube, OG tube   Neck: Supple   Lungs:    Coarse crepitation bilaterally on auscultation   CV:  Regular rate and rhythm,no murmur, click, rub or gallop   Abdomen:   Soft, non-tender. bowel sounds normal. non-distended   Lines/Devices:  Intact, no erythema, drainage or tenderness   Psych:  Deeply sedated, withdraw to painful stimuli       LABS AND  DATA: Personally reviewed  Recent Labs     08/25/21 0457 08/24/21 0446   WBC 21.4* 14.3*   HGB 16.4 15.6   HCT 46.9 46.5    165     Recent Labs     08/25/21 0457 08/24/21  2147 08/24/21 0446 08/24/21 0446   * 129*   < > 133*   K 4.8 4.8   < > 4.8    99   < > 105   CO2 23 26   < > 23   BUN 31* 31*   < > 24*   CREA 1.08 0.98   < > 0.89   * 147*   < > 183*   CA 8.3* 8.6   < > 8.1*   MG 2.5*  --   --  2.6*   PHOS 4.2  --   --  3.2    < > = values in this interval not displayed. Recent Labs     08/25/21 0457 08/24/21 0446    117   TP 6.4 5.9*   ALB 3.3* 3.3*   GLOB 3.1 2.6     No results for input(s): INR, PTP, APTT, INREXT in the last 72 hours. No results for input(s): PHI, PCO2I, PO2I, FIO2I in the last 72 hours. No results for input(s): CPK, CKMB, TROIQ, BNPP in the last 72 hours.     Hemodynamics:   PAP:   CO:     Wedge:   CI:     CVP:    SVR:       PVR:       Ventilator Settings:  Mode Rate Tidal Volume Pressure FiO2 PEEP   Assist control, Pressure control        100 % 15 cm H20     Peak airway pressure: 36 cm H2O    Minute ventilation: 10.3 l/min        MEDS: Reviewed    Chest X-Ray:  CXR Results  (Last 48 hours)               08/25/21 0742  XR CHEST PORT Final result Impression:  1. ET tube is in satisfactory position. 2. Worsening bilateral infiltrates       Narrative:  EXAM: XR CHEST PORT       INDICATION: ETT placement       COMPARISON: 8/22/2021       FINDINGS: A portable AP radiograph of the chest was obtained at 0734 hours. The   patient is on a cardiac monitor. The ET tube is in satisfactory position. NG   tube has its tip in the stomach. The lungs demonstrate bilateral interstitial   and airspace infiltrates most pronounced in the mid lower lung zones. This is   consistent with pneumonia. This has worsened in the interval.                   Assessment and Plan:   Acute hypoxic respiratory failure secondary to COVID-19 Pneumonia: Admitted to the hospital 8/14. Initial symptoms 8/8. Unvaccinated. He was started on dexamethasone, remdesivir, and tocilizumab. He was admitted to the ICU on 8/15 for worsening hypoxia and required BiPAP. Initially went to floor on 8/18, but back to ICU on 8/22 for worsening hypoxia. Intubated on August 25  Adjust mechanical ventilation, goal is for protective ventilatory strategy. Tidal volume of 6 to 8mL/kg of ideal body weight [around 450 cc for this gentleman]  - Continue empiric Cefepime 8/22 - present  - MRSA swab NEG - stopped Vanc  - Continue methylpred  - CXR  - Lovenox ppx   - Diuresis as needed  - DVT scan NEG & has received Toci     NABEEL:    -Creatinine plateaued, good urine output. Trend BMP     Moderate protein calorie malnutrition:  Discontinue TPN, start patient on tube feeding. GI and DVT prophylaxis, ventilator bundle.       Multidisciplinary Rounds Completed:  Yes    DISPOSITION  Stay in ICU    CRITICAL CARE CONSULTANT NOTE  I had a face to face encounter with the patient, reviewed and interpreted patient data including clinical events, labs, images, vital signs, I/O's, and examined patient.   I have discussed the case and the plan and management of the patient's care with the consulting services, the bedside nurses and the respiratory therapist.      NOTE OF PERSONAL INVOLVEMENT IN CARE   This patient has a high probability of imminent, clinically significant deterioration, which requires the highest level of preparedness to intervene urgently. I participated in the decision-making and personally managed or directed the management of the following life and organ supporting interventions that required my frequent assessment to treat or prevent imminent deterioration. I personally spent 40 minutes of critical care time. This is time spent at this critically ill patient's bedside actively involved in patient care as well as the coordination of care and discussions with the patient's family. This does not include any procedural time which has been billed separately. Julio Sierra M.D.   Staff Intensivist/Pulmonologist  [facility] Care  8/25/2021

## 2021-08-25 NOTE — PROGRESS NOTES
0000: Patient complains of numbness and tingling in bilateral lower extremities. NP notified. 0500: Patient O2 in the 80's. Received orders for ABG.     0600: MD, NP intensivist, RT, and this RN at bedside to perform intubation. Propofol, Fentanyl, Rocuronium, Bryan injection, and Propofol gtt used. OGT placed. Orders for chest and KUB xray. 5378: Patient attempt to pull ETT. Received orders for fentanyl gtt. Will continue to monitor.

## 2021-08-26 NOTE — PROGRESS NOTES
NUTRITION brief  Recommendations:     Modify tube feeding: Nepro @ 30 ml/hr (x 20 hr d/t proning) with 2 packets Prosource tid and 120 ml water flush q 4 hr    Weigh patient       Diet: NPO  Nutrition Support: Jevity 1.5 @ 50 ml/hr with 100 ml water flush q 4 hr  Nutrition-related meds: Propofol @ 31.8 ml/hr (840 lipid calories per day)    New events impacting nutrition plan of care:   Acute hypoxic respiratory failure d/t COVID PNA. Pt required 100% BiPAP support for several days-intubated yesterday morning. Plan today to prone and paralyze patient. ? NABEEL-rising BUN, Cr, magnesium and potassium. Will transition patient off high fiber formula until stable; suggest Nepro d/t renal insufficiency. Recommend the following goal while on propofol: Nepro @ 30 ml/hr (x 20 hr d/t proning) with 2 packets Prosource tid and 120 ml water flush q 4 hr. This will provide 600 ml, 1425 calories, 138 gm protein, 24 mEq potassium and 1510 ml free water (tube feeding/flush) per day to meet estimated needs (including calories from propofol). See full RD assessment from 8/23 for additional details, goals, and monitoring/evaluation.    Estimated Nutrition Needs:   Energy: 2120 (20 kcal/kg)  Wt used: Current (106 kg)  Protein: 150 (2g/kg IBW)  Wt used: Ideal   Fluid:   1 ml/kcal    Nghia Nunes, RD [facility]

## 2021-08-26 NOTE — PROGRESS NOTES
2200: Patient in coughing fit. Desated to 50's. Unable to recooperate sats. Received orders for 100 mg of rocuronium.

## 2021-08-26 NOTE — PROGRESS NOTES
[facility]    ICU TEAM Progress Note    Name: Bennett Temple   : 1975   MRN: [id number]   Date: 2021      I  Subjective:   Progress Note: 2021      Reason for ICU Admission:  Acute hypoxic respiratory failure due to COVID-19 pneumonia     Interval history:  Mr Tommie Singer is a 50 yo male who presented to the ED on  with no prior PMH with SOB x2 days. Kaya Gregg had developed body aches and fever as of .  He was diagnosed with COVID-19 pneumonia.  He was not vaccinated. He was started on dexamethasone, remdesivir -, and tocilizumab . He was admitted to the ICU on 8/15 for worsening hypoxia and required BiPAP.  He remained in the ICU from 8/15- on BiPAP.  He weaned from 100% to 80%  and was able to get himself from bed to chair. He was transferred to the [facility] on . FIO2 further decreased to 70% on .      Hi Flow was attempted , but he quickly desaturated to 72%.  He was placed back on BiPAP at 80% FIO2 (IPAP/EPAP not listed).  CXR on  noted to have increased infiltrates. Kaya Gregg was able to take a break from BiPAP on  to eat, transitioning from HiFlow, but again became hypoxic, needing to go back to BiPAP.      As of , he reported progressively worsening cough, which seemed more intense when he would take breaks from the BiPAP and attempt to eat. This was corroborated by staff. Lynn Aase these coughing episodes, sats would decrease as low as 50s. He had been increased to 100% FIo2 on BiPAP  at the time of my exam.  Procal was elevated to 1.88.  WBC inc to 16.3. Kaya Gregg had been started on empiric vanc/cefepime.       Overnight Events:   : Remains tenuous, no improvement in oxygen requirement or ventilatory support. Requiring higher doses for sedation  : Intubated early morning on the . Sedated.       Active Problem List:     Problem List  Never Reviewed        Codes Class    Severe sepsis ([facility] [address] ICD-10-CM: A41.9, R65.20  ICD-9-CM: 038.9, 995.92         Pneumonia due to COVID-19 virus ICD-10-CM: U07.1, J12.82  ICD-9-CM: 480.8, 079.89         ACL tear ICD-10-CM: S83.519A  ICD-9-CM: 844.2               Past Medical History:      has no past medical history of Abuse, Anemia NEC, Calculus of kidney, Congestive heart failure, unspecified, Contact dermatitis and other eczema, due to unspecified cause, Depression, Headache(784.0), Hypercholesterolemia, Psychotic disorder, or Trauma. Past Surgical History:      has a past surgical history that includes hx heent and hx orthopaedic. Home Medications:     Prior to Admission medications    Medication Sig Start Date End Date Taking? Authorizing Provider   azithromycin (ZITHROMAX) 250 mg tablet Take 2 tablets today, then take 1 tablet daily 8/12/21   Tiara Fry NP   albuterol sulfate (PROAIR RESPICLICK) 90 mcg/actuation breath activated inhaler Take 2 Puffs by inhalation four (4) times daily as needed for Cough (SOB, wheezing). 8/12/21   Tiara Fry NP   EPINEPHrine (EPIPEN 2-SOLITARIO) 0.3 mg/0.3 mL (1:1,000) injection 0.3 mL by IntraMUSCular route once as needed for 1 dose. 8/29/13   Leatha Esqueda MD   diazepam (VALIUM) 5 mg tablet Take 1 Tab by mouth every eight (8) hours as needed (spasm). 11/13/12   LIBERTY Raman       Allergies/Social/Family History:     No Known Allergies   Social History     Tobacco Use    Smoking status: Former Smoker     Packs/day: 1.00     Years: 10.00     Pack years: 10.00    Smokeless tobacco: Never Used   Substance Use Topics    Alcohol use: Yes     Alcohol/week: 3.3 standard drinks     Types: 4 Cans of beer per week      No family history on file.         Objective:   Vital Signs:  Visit Vitals  /70 (BP 1 Location: Right arm, BP Patient Position: At rest)   Pulse 63   Temp 100.2 °F (37.9 °C)   Resp 18   Ht 5' 10\" (1.778 m)   Wt 106.1 kg (234 lb)   SpO2 92%   BMI 33.58 kg/m²    O2 Flow Rate (L/min): 60 l/min O2 Device: Ventilator, Endotracheal tube Temp (24hrs), Av.9 °F (37.2 °C), Min:97.4 °F (36.3 °C), Max:100.3 °F (37.9 °C)           Intake/Output:     Intake/Output Summary (Last 24 hours) at 2021 0854  Last data filed at 2021 0835  Gross per 24 hour   Intake 3283.4 ml   Output 3860 ml   Net -576.6 ml       Physical Exam:    General:   Sedated intubated on mechanical ventilation   Eyes:  Sclera anicteric. Pupils equally round and reactive to light. Mouth/Throat: Mucous membranes normal, oral pharynx clear, ET tube, OG tube   Neck: Supple   Lungs:    Coarse crepitation bilaterally on auscultation   CV:  Regular rate and rhythm,no murmur, click, rub or gallop   Abdomen:   Soft, non-tender. bowel sounds normal. non-distended   Lines/Devices:  Intact, no erythema, drainage or tenderness   Psych:  Deeply sedated, withdraw to painful stimuli         LABS AND  DATA: Personally reviewed  Recent Labs     21  0505 21   WBC 15.7* 21.4*   HGB 15.4 16.4   HCT 45.7 46.9   * 176     Recent Labs     21  0505 21    131*   K 5.4* 4.8    102   CO2 26 23   BUN 29* 31*   CREA 1.15 1.08   * 132*   CA 8.4* 8.3*   MG 2.7* 2.5*   PHOS 3.4 4.2     Recent Labs     21  0505 21    112   TP 6.0* 6.4   ALB 2.8* 3.3*   GLOB 3.2 3.1     No results for input(s): INR, PTP, APTT, INREXT in the last 72 hours. Recent Labs     21  050   PHI 7.35   PCO2I 50.9*   PO2I 66*   FIO2I 100     No results for input(s): CPK, CKMB, TROIQ, BNPP in the last 72 hours.     Hemodynamics:   PAP:   CO:     Wedge:   CI:     CVP:    SVR:       PVR:       Ventilator Settings:  Mode Rate Tidal Volume Pressure FiO2 PEEP   Assist control, Pressure control   400 ml    100 % 15 cm H20     Peak airway pressure: 31 cm H2O    Minute ventilation: 11.6 l/min        MEDS: Reviewed    Chest X-Ray:  CXR Results  (Last 48 hours)               21 0742  XR CHEST PORT Final result    Impression:  1. ET tube is in satisfactory position. 2. Worsening bilateral infiltrates       Narrative:  EXAM: XR CHEST PORT       INDICATION: ETT placement       COMPARISON: 8/22/2021       FINDINGS: A portable AP radiograph of the chest was obtained at 0734 hours. The   patient is on a cardiac monitor. The ET tube is in satisfactory position. NG   tube has its tip in the stomach. The lungs demonstrate bilateral interstitial   and airspace infiltrates most pronounced in the mid lower lung zones. This is   consistent with pneumonia. This has worsened in the interval.                   Assessment and Plan:   Acute hypoxic respiratory failure secondary to COVID-19 Pneumonia: Admitted to the hospital 8/14. Initial symptoms 8/8. Unvaccinated. He was started on dexamethasone, remdesivir, and tocilizumab. He was admitted to the ICU on 8/15 for worsening hypoxia and required BiPAP. Initially went to floor on 8/18, but back to ICU on 8/22 for worsening hypoxia. Intubated on August 25  Adjust mechanical ventilation, goal is for protective ventilatory strategy. Tidal volume of 6 to 8mL/kg of ideal body weight [around 450 cc for this gentleman]. Start proning for 16 hours a day if tolerated  Keep deeply sedated RASS -3, start Nimbex to ensure vent synchrony  - Continue empiric Cefepime 8/22 - present  - MRSA swab NEG - stopped Vanc  - Continue methylpred  - CXR  - Lovenox ppx   - Diuresis as needed  - DVT scan NEG & has received Toci     NABEEL:    -Creatinine plateaued, good urine output. Trend BMP      Moderate protein calorie malnutrition:  Discontinue TPN, start patient on tube feeding.     GI and DVT prophylaxis, ventilator bundle. DISPOSITION  Stay in ICU    CRITICAL CARE CONSULTANT NOTE  I had a face to face encounter with the patient, reviewed and interpreted patient data including clinical events, labs, images, vital signs, I/O's, and examined patient.   I have discussed the case and the plan and management of the patient's care with the consulting services, the bedside nurses and the respiratory therapist.      NOTE OF PERSONAL INVOLVEMENT IN CARE   This patient has a high probability of imminent, clinically significant deterioration, which requires the highest level of preparedness to intervene urgently. I participated in the decision-making and personally managed or directed the management of the following life and organ supporting interventions that required my frequent assessment to treat or prevent imminent deterioration. I personally spent 40 minutes of critical care time. This is time spent at this critically ill patient's bedside actively involved in patient care as well as the coordination of care and discussions with the patient's family. This does not include any procedural time which has been billed separately. Salvador Mena M.D.   Staff Intensivist/Pulmonologist  [facility] Care  8/26/2021

## 2021-08-26 NOTE — PROGRESS NOTES
Bedside shift change report given to Zoraida Mcgarry RN (oncoming nurse) by Inga Resendiz RN (offgoing nurse). Report included the following information SBAR, Kardex, ED Summary, Procedure Summary, Intake/Output, MAR, Recent Results, Med Rec Status, Cardiac Rhythm NSR, Alarm Parameters  and Quality Measures. 0920: Pt's O2 decreased to 69% pt agitated, awake and breathing against ventilator, Dr. Antonietta Pino notified, orders received for 5mg of versed and versed gtt. 1000: Discussed pt with Dr. Antonietta Pino at bedside, orders received to paralyze and prone pt one PICC line is placed. 1205: Pt not tolerating being suctioned despite change in sedation, pt's O2 decreased to 44% and pt opened eyes and pulled up off the bed, Dr. Antonietta Pino notified. 1415: Pt proned per Dr. Antonietta Pino with RN, Eitan Solis RT, Jeaneth RN, Ajay Hyde RN and [facility] PCT. 1900: Pt's 's O2 78%, Dr. Antonietta Pino notified, RT notified, orders received for IV versed push. 1905: No improvement with IV versed, RT called to remind of nitric order, Dr. Antonietta Pino notified. 1914: Pt's O2 64%, Dr. Antonietta Pino notified. 1916: Dr. Antonietta Pino at bedside, RT at bedside bagging pt. Orders received to emergently supinate pt.     1920: Pt supinated. Orders received for stat chest xray. Bedside shift change report given to Sebastian Horton RN (oncoming nurse) by Zoraida Mcgarry RN (offgoing nurse). Report included the following information SBAR, Kardex, ED Summary, Procedure Summary, Intake/Output, MAR, Recent Results, Med Rec Status, Cardiac Rhythm NSR, Alarm Parameters , Quality Measures and Dual Neuro Assessment.

## 2021-08-26 NOTE — PROGRESS NOTES
Transition of Care Plan   RUR-  Medium    COVID positive   DISPOSITION: pending medical progression   F/U with PCP/Specialist     Transport: TBD  Patient remains in the ICU, vented on Nimbex, Precedex, Fentanyl, Versed and Diprivan gtts. Patient is being proned. Per notes patient withdraws to painful stimuli. Patient's sister Bao Hayes [phone] is [facility]. Care management will continue to follow for transitions of care.  Eloina Laurent RN,Care Management

## 2021-08-26 NOTE — PROCEDURES
PRE-PROCEDURE VERIFICATION  Correct Procedure: yes  Correct Site:  yes  Temperature: Temp: (!) 100.7 °F (38.2 °C), Temperature Source: Temp Source: Bladder  Recent Labs     08/26/21  0505   BUN 29*   CREA 1.15   *   WBC 15.7*     Allergies: Patient has no known allergies. Education materials, including PICC Booklet, for PICC Care left at bedside: yes. See Patient Education activity for further details. PROCEDURE DETAIL  A triple lumen PICC line was started for vascular access. The following documentation is in addition to the PICC properties in the lines/airways flowsheet :  Lot #: [id number]  Was xylocaine 1% used intradermally:  yes  Total catheter length: 40 (cm)  External catheter length:  1 (cm)  Vein Selection for PICC:right basilic  Central Line Bundle followed yes  Complication Related to Insertion: none    The placement was verified by ECG/Sapiens technology: The  tip location is in the superior vena cava. See ECG results for PICC tip placement. Report given to nurse Yosef Dunn is okay to use.     Avril Louie RN

## 2021-08-27 NOTE — PROGRESS NOTES
[address]         NAME:Derian Moody  QDX:[id number]   :1975                  REPEAT K      6.5  Arya Li has given iv calcium, insulin and d50 and another dose of lokelma. Repeat labs at 6 pm  He is making good amount of urine. k should improve  May need RRT if k remains high      Eduardo Newman MD  [facility]  [facility]  [address], Doreen [address]ena  [place], [address] [place]  Phone - [phone]         Fax - [phone] [facility] Office  [address] [place] [address]  Phone - [phone]        Fax - [phone]     www. [email or web address]

## 2021-08-27 NOTE — CONSULTS
Nephrology Consult Note     [address]     www. [facility]              Phone - [phone]   Patient: Maya Sauer   YOB: 1975    Date- 8/27/2021  MRN: [id number]             REASON FOR CONSULTATION: NABEEL, HYPERKALEMIA  CONSULTING PHYSICIAN: DR.AL Whitaker    ADMIT DATE:8/14/2021 PATIENT PCP:None     IMPRESSION & PLAN:     acute kidney injury due to ATN - hypotension +/- vanco +/- covid 19 related   Hyperkalemia   resp failure   covid 19- pneumonia   Protein malnutrition    PLAN-  · Iv calcium gluconate  · lokelma 10 gram  · Iv insulin and d 50  · Change tube feed to NEPRO  · Check vanco level  · Check bmp at noon  · If k remains high - he will need RRT      · Active Problems:  ·   Severe sepsis ([address] (8/14/2021)  ·   ·   Pneumonia due to COVID-19 virus (8/14/2021)  ·   ·     [x] High complexity decision making was performed  [x] Patient is at high-risk of decompensation with multiple organ involvement    Subjective:   HPI: Maya Sauer is a 51 y.o.male. He is admitted with sob on 8-14-21  He is dx with covid 19 pneumonia. He is in ICU on vent  His k 6.8 and cr 1.9 today  His cr was 1.15 yesterday  His vanco level 34.4 on 8-21-21  He as hypotensive requiring vasopressor support on 8-25-21  His urine out put 2940 ml in last 24 hours        Review of Systems:   Can't access due to patient's current condition       No past medical history on file. Past Surgical History:   Procedure Laterality Date    HX HEENT      ORAL SURGERY    HX ORTHOPAEDIC      left knee      Prior to Admission medications    Medication Sig Start Date End Date Taking? Authorizing Provider   azithromycin (ZITHROMAX) 250 mg tablet Take 2 tablets today, then take 1 tablet daily 8/12/21   Tiara Fry NP   albuterol sulfate (PROAIR RESPICLICK) 90 mcg/actuation breath activated inhaler Take 2 Puffs by inhalation four (4) times daily as needed for Cough (SOB, wheezing).  8/12/21   iTara Fry, NP   EPINEPHrine (EPIPEN 2-SOLITARIO) 0.3 mg/0.3 mL (1:1,000) injection 0.3 mL by IntraMUSCular route once as needed for 1 dose. 8/29/13   Lizet Hogan MD   diazepam (VALIUM) 5 mg tablet Take 1 Tab by mouth every eight (8) hours as needed (spasm). 11/13/12   LIBERTY Headley     No Known Allergies   Social History     Tobacco Use    Smoking status: Former Smoker     Packs/day: 1.00     Years: 10.00     Pack years: 10.00    Smokeless tobacco: Never Used   Substance Use Topics    Alcohol use: Yes     Alcohol/week: 3.3 standard drinks     Types: 4 Cans of beer per week      No family history on file.      Objective:      Patient Vitals for the past 24 hrs:   Temp Pulse Resp BP SpO2   08/27/21 0800 (!) 100.5 °F (38.1 °C) 72 24 124/74 97 %   08/27/21 0700  73 20 126/79 98 %   08/27/21 0600  71 20 102/74 98 %   08/27/21 0500  71 20 110/77 98 %   08/27/21 0400 (!) 100.5 °F (38.1 °C) 71 20 111/75 98 %   08/27/21 0340  78 20  98 %   08/27/21 0300  72 20 115/76 98 %   08/27/21 0200  73 20 118/78 98 %   08/27/21 0100  74 20 121/80 98 %   08/27/21 0000 100.3 °F (37.9 °C) 75 20 119/80 98 %   08/26/21 2300  76 20 121/81 97 %   08/26/21 2220  81 20  97 %   08/26/21 2200  78 20 121/81 98 %   08/26/21 2145  79 20 119/80 98 %   08/26/21 2130  78 20 120/82 98 %   08/26/21 2115  78 20 128/87 97 %   08/26/21 2100  79 20 126/85 97 %   08/26/21 2045  80 20 122/83 97 %   08/26/21 2030  80 20 116/82 97 %   08/26/21 2015  82 20 111/76 97 %   08/26/21 2000 100 °F (37.8 °C) 85 20 106/71 96 %   08/26/21 1928  (!) 103 20  90 %   08/26/21 1926  (!) 104 20  (!) 88 %   08/26/21 1921  (!) 108 14 111/71 (!) 83 %   08/26/21 1916  (!) 127 23  (!) 53 %   08/26/21 1915  (!) 129 27 (!) 176/94 (!) 57 %   08/26/21 1914  (!) 128 18  (!) 64 %   08/26/21 1900  (!) 120 18 (!) 157/85 (!) 81 %   08/26/21 1800  (!) 106 18 134/75 (!) 89 %   08/26/21 1700  (!) 102 18 127/72 (!) 88 %   08/26/21 1600 100.1 °F (37.8 °C) 78 18 106/65 93 %   08/26/21 1500  75 18 109/64 92 %   08/26/21 1430  74 18  (!) 86 %   08/26/21 1400  69 18 116/72 (!) 84 %   08/26/21 1300  74 16 110/63 93 %   08/26/21 1215  87 17 125/71 (!) 87 %   08/26/21 1210  89 18  (!) 76 %   08/26/21 1205     (!) 44 %   08/26/21 1200 (!) 100.7 °F (38.2 °C) 83 21 110/66 (!) 87 %   08/26/21 1100 (!) 100.8 °F (38.2 °C) 79 21 117/69 91 %   08/26/21 1056  84 21  90 %   08/26/21 1000  73 19 124/68 (!) 89 %   08/26/21 0945  69 18 125/68 (!) 89 %   08/26/21 0930  68 11 115/70 90 %   08/26/21 0915  68 9 119/72 90 %   08/26/21 0900  67 17 131/73 91 %   08/26/21 0845  66 18 128/73 91 %     08/27 0701 - 08/27 1900  In: 1257.5 [I.V.:1107.5]  Out: 125 [Urine:125]  Last 3 Recorded Weights in this Encounter    08/21/21 0932 08/26/21 0943 08/27/21 0500   Weight: 106.1 kg (234 lb) 109.8 kg (242 lb 1 oz) 113 kg (249 lb 1.9 oz)      Physical Exam:  GEN: ON VENT  NECK- ET tube +  RESP: no distress  NEURO:Can't access due to patient's current condition     Exam deferred due to COVID-19 infection in order to preserve PPE AND minimize risk of   transmission to dialysis and renal transplant patient per ASN and RPA guidelines:          CODE STATUS:  FULL  Care Plan discussed with:   NURSE, DR.AL Whitaker     Chart reviewed.    y Reviewed previous records   y Discussion with patient and/or family and questions answered       ECG[de-identified] Rev:yes  Xray/CT/US/MRI REV:yes  Lab Data Personally Reviewed: (see below)  Recent Labs     08/27/21  0342 08/26/21  0505 08/25/21  0457 08/24/21  2147   WBC 21.6* 15.7* 21.4*  --    HGB 15.2 15.4 16.4  --    * 125* 176  --     136 131* 129*   K 6.8* 5.4* 4.8 4.8   * 163* 132* 147*   BUN 38* 29* 31* 31*   CREA 1.94* 1.15 1.08 0.98   * 72 99*  --    TBILI 0.8 0.7 0.9  --    * 102 112  --    CA 8.1* 8.4* 8.3* 8.6   MG 3.1* 2.7* 2.5*  --    PHOS 5.6* 3.4 4.2  --      No results found for: COLOR, APPRN, SPGRU, REFSG, YORDAN, PROTU, GLUCU, KETU, BILU, UROU, REECE, LEUKU, GLUKE, EPSU, BACTU, WBCU, RBCU, CASTS, UCRY    Lab Results   Component Value Date/Time    Ferritin 2,783 (H) 2021 03:41 AM     Lab Results   Component Value Date/Time    Culture result: HEAVY NORMAL RESPIRATORY JESSICA 2021 07:06 AM    Culture result: NO GROWTH 4 DAYS 2021 08:51 PM    Culture result: MRSA NOT PRESENT 2021 03:41 PM    Culture result:  2021 03:41 PM     Screening of patient nares for MRSA is for surveillance purposes and, if positive, to facilitate isolation considerations in high risk settings. It is not intended for automatic decolonization interventions per se as regimens are not sufficiently effective to warrant routine use. Prior to Admission Medications   Prescriptions Last Dose Informant Patient Reported? Taking? EPINEPHrine (EPIPEN 2-SOLITARIO) 0.3 mg/0.3 mL (1:1,000) injection Unknown at Unknown time  No No   Si.3 mL by IntraMUSCular route once as needed for 1 dose. albuterol sulfate (PROAIR RESPICLICK) 90 mcg/actuation breath activated inhaler Unknown at Unknown time  No No   Sig: Take 2 Puffs by inhalation four (4) times daily as needed for Cough (SOB, wheezing). azithromycin (ZITHROMAX) 250 mg tablet Unknown at Unknown time  No No   Sig: Take 2 tablets today, then take 1 tablet daily   benzonatate (Tessalon Perles) 100 mg capsule Unknown at Unknown time  No No   Sig: Take 1 Capsule by mouth three (3) times daily as needed for Cough for up to 7 days. diazepam (VALIUM) 5 mg tablet Unknown at Unknown time  No No   Sig: Take 1 Tab by mouth every eight (8) hours as needed (spasm). predniSONE (DELTASONE) 50 mg tablet   No No   Sig: Take 1 Tablet by mouth daily for 3 days. Facility-Administered Medications: None     Imaging:    Medications list Personally Reviewed   [x]      Yes     []               No    Thank you for allowing us to participate in the care this patient. We will follow patient with you.   Signed By: Edgar Hancock, [facility]  [facility] [address], [place] [address]  [facility], [address] [facility]  Phone - [phone]         Fax - [phone] [facility] Office  [address] [address]  Phone - [phone]        Fax - [phone]     [email or web address]

## 2021-08-27 NOTE — ADT AUTH CERT NOTES
Viral Illness, Acute - Care Day 13 (8/26/2021) by Lary Mccloud RN       Review Status Review Entered   Completed 8/27/2021 16:17      Criteria Review      Care Day: 13 Care Date: 8/26/2021 Level of Care: Intermediate Care    Guideline Day 2    Level Of Care    ( ) Floor    8/27/2021 16:17:08 EDT by Michael Lopez      ip icu    Clinical Status    (X) * Hypotension absent    8/27/2021 16:17:08 EDT by Michael Lopez      bp 157/85    ( ) * No requirement for mechanical ventilation    8/27/2021 16:17:08 EDT by Michael Lopez      intubated on vent    (X) * Oxygenation at baseline or improved    8/27/2021 16:17:08 EDT by Rhianna Almanza      23 90% vent    ( ) * Mental status at baseline    8/27/2021 16:17:08 EDT by Rhianna Almanza      desated on the vent    Routes    ( ) * Oral hydration    8/27/2021 16:17:08 EDT by Michael Lopez      tube feeding    (X) Oral or IV medications    8/27/2021 16:17:08 EDT by Rhianna Almanza      Nimbex drip titrate iv  Precedex drip titrate iv    Pepcid 20mg iv q12  Fentanyl drip titrate iv  Sol-medrol 40mg iv q6  Versed drip titrate iv  Diprivan drip titrate iv    Interventions    (X) Possible isolation    8/27/2021 16:17:08 EDT by Rhianna Almanza      droplet plus isolation    (X) Pulse oximetry    8/27/2021 16:17:08 EDT by Rhianna Almanza      continuous pulse ox    (X) Possible oxygen    8/27/2021 16:17:08 EDT by Michael Lopez      ventilator    Medications    (X) Possible antibiotic (eg, for bacterial coinfection or superinfection)    8/27/2021 16:17:08 EDT by Rhianna Almanza      Maxipime 2g iv q6    (X) Possible DVT prophylaxis    8/27/2021 16:17:08 EDT by Michael Lopez      Lovenox 40mg sc q12    * Milestone   Additional Notes   8/26/2021   LOC IP ICU   VS  100.8 tmax 127 157/85 23 90% vent   LABS wbc 15.7   Potassium: 5.4 (H)   Chloride: 106   CO2: 26   Anion gap: 4 (L)   Glucose: 163 (H)   BUN: 29 (H)   Creatinine: 1.15   BUN/Creatinine ratio: 25 (H)   Calcium: 8.4 (L)   Phosphorus: 3.4   Magnesium: 2.7 (H)   GFR est non-AA: >60   GFR est AA: >60   Bilirubin, total: 0.7   Protein, total: 6.0 (L)   Albumin: 2.8 (L)   Globulin: 3.2   A-G Ratio: 0.9 (L)   ALT: 72   AST: 40 (H)   Alk. phosphatase: 102   Lactic acid: 2.1 (HH)   Procalcitonin: 0.05   Triglyceride: 352 (H)   ABG    pH (POC): 7.35   pCO2 (POC): 50.9 (H)   pO2 (POC): 66 (L)   HCO3 (POC): 27.9 (H)   sO2 (POC): 91.2 (L)   Base excess (POC): 1.2   FIO2 (POC): 100   Specimen type (POC): ARTERIAL   Set Rate: 18   Site: RIGHT RADIAL   Device[de-identified] ADULT VENT   Mode: PRESSURE CONTROL      CXR IMPRESSION   Right picc line tip over the distal superior vena cava.  No pneumothorax. Diffuse bilateral interstitial and airspace opacities. MEDS   Tylenol 650mg po q6 prn x1   SSI x1         ATTENDING NOTE      Overnight Events:    8/26: Remains tenuous, no improvement in oxygen requirement or ventilatory support.  Requiring higher doses for sedation   Physical Exam:       General: Sedated intubated on mechanical ventilation   Eyes: Sclera anicteric. Pupils equally round and reactive to light. Mouth/Throat: Mucous membranes normal, oral pharynx clear, ET tube, OG tube   Neck: Supple   Lungs:   Coarse crepitation bilaterally on auscultation   CV: Regular rate and rhythm,no murmur, click, rub or gallop   Abdomen:   Soft, non-tender. bowel sounds normal. non-distended   Lines/Devices: Intact, no erythema, drainage or tenderness   Psych: Deeply sedated, withdraw to painful stimuli      Ventilator Settings:   Mode Rate Tidal Volume Pressure FiO2 PEEP   Assist control, Pressure control 400 ml 100 % 15 cm H20       Peak airway pressure: 31 cm H2O    Minute ventilation: 11.6 l/min    Assessment and Plan:   Acute hypoxic respiratory failure secondary to COVID-19 Pneumonia: Admitted to the hospital 8/14. Initial symptoms 8/8. Unvaccinated.  He was started on dexamethasone, remdesivir, and tocilizumab. He was admitted to the ICU on 8/15 for worsening hypoxia and required BiPAP.  Initially went to floor on 8/18, but back to ICU on 8/22 for worsening hypoxia.   -Intubated on August 25   -Adjust mechanical ventilation, goal is for protective ventilatory strategy.  Tidal volume of 6 to 8mL/kg of ideal body weight [around 450 cc for this gentleman].  Start proning for 16 hours a day if tolerated   -Keep deeply sedated RASS -3, start Nimbex to ensure vent synchrony   - Continue empiric Cefepime 8/22 - present   - MRSA swab NEG - stopped Vanc   - Continue methylpred   - CXR   - Lovenox ppx    - Diuresis as needed   - DVT scan NEG & has received Toci       NABEEL:     -Creatinine plateaued, good urine output.  Trend BMP        Moderate protein calorie malnutrition:   Discontinue TPN, start patient on tube feeding.       GI and DVT prophylaxis, ventilator bundle.         Viral Illness, Acute - Care Day 12 (8/25/2021) by Brittanie Galeano RN       Review Status Review Entered   Completed 8/27/2021 16:09      Criteria Review      Care Day: 12 Care Date: 8/25/2021 Level of Care: Intermediate Care    Guideline Day 2    Level Of Care    ( ) Floor    8/27/2021 16:09:35 EDT by Nir Crocker       icu    Clinical Status    (X) * Hypotension absent    8/27/2021 16:09:35 EDT by Rhianna York      bp 166/104    (X) * No requirement for mechanical ventilation    8/27/2021 16:09:35 EDT by Rhianna York      on the vent    ( ) * Oxygenation at baseline or improved    8/27/2021 16:09:35 EDT by Nir Crocker      intubated on the ventilator    (X) * Mental status at baseline    8/27/2021 16:09:35 EDT by Nir Crocker      sedated on vent    Routes    ( ) * Oral hydration    8/27/2021 16:09:35 EDT by Nir Crocker      tube feeding 30ml/hr    Interventions    (X) Possible isolation    8/27/2021 16:09:35 EDT by Rhianna York      droplet plus isolation    (X) Pulse oximetry    8/27/2021 16:09:35 EDT by Kevin Jeff      continuous pulse ox    (X) Possible oxygen    8/27/2021 16:09:35 EDT by Kevin Aguilar      on the vent    Medications    (X) Possible antibiotic (eg, for bacterial coinfection or superinfection)    8/27/2021 16:09:35 EDT by Kevin Jeff      Maxipime 2g iv q8    (X) Possible DVT prophylaxis    8/27/2021 16:09:35 EDT by Kevin Jeff      Lovenox 40mg sc q12    * Milestone   Additional Notes   8/25/2021   LOC IP ICU   VS  98.9 49 166/104 22 93% VENT   LABS WBC 21.4   Sodium: 131 (L)   Potassium: 4.8   Chloride: 102   CO2: 23   Anion gap: 6   Glucose: 132 (H)   BUN: 31 (H)   Creatinine: 1.08   BUN/Creatinine ratio: 29 (H)   Calcium: 8.3 (L)   Phosphorus: 4.2   Magnesium: 2.5 (H)   GFR est non-AA: >60   GFR est AA: >60   Bilirubin, total: 0.9   Protein, total: 6.4   Albumin: 3.3 (L)   Globulin: 3.1   A-G Ratio: 1.1   ALT: 99 (H)   AST: 76 (H)   Alk. phosphatase: 112   Lactic acid: 2.3 (HH)   pH: 7.39   PCO2: 39   PO2: 46 (LL)   BICARBONATE: 23   O2 SAT: 82 (L)   BASE DEFICIT: 1.5      CXR IMPRESSION   1. ET tube is in satisfactory position. 2. Worsening bilateral infiltrates      MEDS   Precedex drip titrate iv      Fentanyl drip titrate iv   Sol-mdrol 40mg iv q6   Diprivan drip titrate iv      Delsym 30mg po qhs prn x1      ATTENDING NOTE   Reason for ICU Admission:   Acute hypoxic respiratory failure due to COVID-19 pneumonia       Interval history:   Mr Bradley Mitchell is a 52 yo male who presented to the ED on 8/14 with no prior PMH with SOB x2 days. Uday Em had developed body aches and fever as of 8/8.  He was diagnosed with COVID-19 pneumonia.  He was not vaccinated. He was started on dexamethasone, remdesivir 8/14-8/19, and tocilizumab 8/14.  He was admitted to the ICU on 8/15 for worsening hypoxia and required BiPAP.  He remained in the ICU from 8/15-8/18 on BiPAP.  He weaned from 100% to 80% 16/8 and was able to get himself from bed to chair. He was transferred to the [facility] on 8/18. FIO2 further decreased to 70% on 8/19.        Hi Flow was attempted 8/20, but he quickly desaturated to 72%.  He was placed back on BiPAP at 80% FIO2 (IPAP/EPAP not listed).  CXR on 8/20 noted to have increased infiltrates. Shereen Capellan was able to take a break from BiPAP on 8/21 to eat, transitioning from HiFlow, but again became hypoxic, needing to go back to BiPAP.        As of 8/22, he reported progressively worsening cough, which seemed more intense when he would take breaks from the BiPAP and attempt to eat. This was corroborated by staff. Kvng Mariece these coughing episodes, sats would decrease as low as 50s. He had been increased to 100% FIo2 on BiPAP 16/8 at the time of my exam.  Procal was elevated to 1.88.  WBC inc to 16.3. Shereen Capellan had been started on empiric vanc/cefepime.         Overnight Events:    8/25: Intubated early morning on the 25th.  Sedated. Physical Exam:       General: Sedated intubated on mechanical ventilation   Eyes: Sclera anicteric. Pupils equally round and reactive to light. Mouth/Throat: Mucous membranes normal, oral pharynx clear, ET tube, OG tube   Neck: Supple   Lungs:   Coarse crepitation bilaterally on auscultation   CV: Regular rate and rhythm,no murmur, click, rub or gallop   Abdomen:   Soft, non-tender. bowel sounds normal. non-distended   Lines/Devices: Intact, no erythema, drainage or tenderness   Psych: Deeply sedated, withdraw to painful stimuli      Ventilator Settings:   Mode Rate Tidal Volume Pressure FiO2 PEEP   Assist control, Pressure control   100 % 15 cm H20       Peak airway pressure: 36 cm H2O    Minute ventilation: 10.3 l/min        Assessment and Plan:   Acute hypoxic respiratory failure secondary to COVID-19 Pneumonia: Admitted to the hospital 8/14. Initial symptoms 8/8. Unvaccinated. He was started on dexamethasone, remdesivir, and tocilizumab.  He was admitted to the ICU on 8/15 for worsening hypoxia and required BiPAP. Initially went to floor on 8/18, but back to ICU on 8/22 for worsening hypoxia.   -Intubated on August 25   -Adjust mechanical ventilation, goal is for protective ventilatory strategy.  Tidal volume of 6 to 8mL/kg of ideal body weight [around 450 cc for this gentleman]   - Continue empiric Cefepime 8/22 - present   - MRSA swab NEG - stopped Vanc   - Continue methylpred   - CXR   - Lovenox ppx    - Diuresis as needed   - DVT scan NEG & has received Toci       NABEEL:     -Creatinine plateaued, good urine output.  Trend BMP        Moderate protein calorie malnutrition:   Discontinue TPN, start patient on tube feeding.       GI and DVT prophylaxis, ventilator bundle.

## 2021-08-27 NOTE — PROGRESS NOTES
[facility]    ICU TEAM Progress Note    Name: Katherin Morrow   : 1975   MRN: [id number]   Date: 2021      I  Subjective:   Progress Note: 2021    Reason for ICU Admission:  Acute hypoxic respiratory failure due to COVID-19 pneumonia     Interval history:  Mr Christa Kruger is a 52 yo male who presented to the ED on  with no prior PMH with SOB x2 days. [facility] had developed body aches and fever as of .  He was diagnosed with COVID-19 pneumonia.  He was not vaccinated. He was started on dexamethasone, remdesivir -, and tocilizumab . He was admitted to the ICU on 8/15 for worsening hypoxia and required BiPAP.  He remained in the ICU from 8/15- on BiPAP.  He weaned from 100% to 80%  and was able to get himself from bed to chair. He was transferred to the [facility] on . FIO2 further decreased to 70% on .      Hi Flow was attempted , but he quickly desaturated to 72%.  He was placed back on BiPAP at 80% FIO2 (IPAP/EPAP not listed).  CXR on  noted to have increased infiltrates. [facility] was able to take a break from BiPAP on  to eat, transitioning from HiFlow, but again became hypoxic, needing to go back to BiPAP.      As of , he reported progressively worsening cough, which seemed more intense when he would take breaks from the BiPAP and attempt to eat. This was corroborated by staff. Harry Dudley these coughing episodes, sats would decrease as low as 50s. He had been increased to 100% FIo2 on BiPAP  at the time of my exam.  Procal was elevated to 1.88.  WBC inc to 16.3. [facility] had been started on empiric vanc/cefepime.       Overnight Events:   : Patient condition worsened when he was placed in prone position. Led to significant hypoxia. Improved with positioning him back in supine position and adjusting mechanical ventilation. Remained heavily sedated and paralyzed  : Remains tenuous, no improvement in oxygen requirement or ventilatory support. Requiring higher doses for sedation  8/25: Intubated early morning on the 25th.  Sedated. Active Problem List:     Problem List  Never Reviewed        Codes Class    Severe sepsis ([facility] [address] ICD-10-CM: A41.9, R65.20  ICD-9-CM: 038.9, 995.92         Pneumonia due to COVID-19 virus ICD-10-CM: U07.1, J12.82  ICD-9-CM: 480.8, 079.89         ACL tear ICD-10-CM: M37.961M  ICD-9-CM: 844.2               Past Medical History:      has no past medical history of Abuse, Anemia NEC, Calculus of kidney, Congestive heart failure, unspecified, Contact dermatitis and other eczema, due to unspecified cause, Depression, Headache(784.0), Hypercholesterolemia, Psychotic disorder, or Trauma. Past Surgical History:      has a past surgical history that includes hx heent and hx orthopaedic. Home Medications:     Prior to Admission medications    Medication Sig Start Date End Date Taking? Authorizing Provider   azithromycin (ZITHROMAX) 250 mg tablet Take 2 tablets today, then take 1 tablet daily 8/12/21   Marcos Glass NP   albuterol sulfate (PROAIR RESPICLICK) 90 mcg/actuation breath activated inhaler Take 2 Puffs by inhalation four (4) times daily as needed for Cough (SOB, wheezing). 8/12/21   Marcos Glass NP   EPINEPHrine (EPIPEN 2-SOLITARIO) 0.3 mg/0.3 mL (1:1,000) injection 0.3 mL by IntraMUSCular route once as needed for 1 dose. 8/29/13   Macarena Velazquez MD   diazepam (VALIUM) 5 mg tablet Take 1 Tab by mouth every eight (8) hours as needed (spasm). 11/13/12   LIBERTY Callejas       Allergies/Social/Family History:     No Known Allergies   Social History     Tobacco Use    Smoking status: Former Smoker     Packs/day: 1.00     Years: 10.00     Pack years: 10.00    Smokeless tobacco: Never Used   Substance Use Topics    Alcohol use: Yes     Alcohol/week: 3.3 standard drinks     Types: 4 Cans of beer per week      No family history on file.     Review of Systems:     Not able to obtain due to patient medical condition    Objective:   Vital Signs:  Visit Vitals  /74 (BP 1 Location: Left arm, BP Patient Position: At rest)   Pulse 72   Temp (!) 100.5 °F (38.1 °C)   Resp 24   Ht 5' 10\" (1.778 m)   Wt 113 kg (249 lb 1.9 oz)   SpO2 97%   BMI 35.74 kg/m²    O2 Flow Rate (L/min): 60 l/min O2 Device: Endotracheal tube Temp (24hrs), Av.4 °F (38 °C), Min:100 °F (37.8 °C), Max:100.8 °F (38.2 °C)           Intake/Output:     Intake/Output Summary (Last 24 hours) at 2021 0809  Last data filed at 2021 0700  Gross per 24 hour   Intake 3596.43 ml   Output 2715 ml   Net 881.43 ml       Physical Exam:    General:   Sedated intubated on mechanical ventilation   Eyes:  Sclera anicteric. Pupils equally round and reactive to light. Mouth/Throat: Mucous membranes normal, oral pharynx clear, ET tube, OG tube   Neck: Supple   Lungs:    Coarse crepitation bilaterally on auscultation   CV:  Regular rate and rhythm,no murmur, click, rub or gallop   Abdomen:   Soft, non-tender. bowel sounds normal. non-distended   Lines/Devices:  Intact, no erythema, drainage or tenderness   Psych:  Deeply sedated, paralyzed         LABS AND  DATA: Personally reviewed  Recent Labs     21  03421  0505   WBC 21.6* 15.7*   HGB 15.2 15.4   HCT 48.8 45.7   * 125*     Recent Labs     21  0342 21  0505    136   K 6.8* 5.4*    106   CO2 31 26   BUN 38* 29*   CREA 1.94* 1.15   * 163*   CA 8.1* 8.4*   MG 3.1* 2.7*   PHOS 5.6* 3.4     Recent Labs     21  03421  0505   * 102   TP 6.1* 6.0*   ALB 2.8* 2.8*   GLOB 3.3 3.2     No results for input(s): INR, PTP, APTT, INREXT in the last 72 hours. Recent Labs     21  0508   PHI 7.35   PCO2I 50.9*   PO2I 66*   FIO2I 100     No results for input(s): CPK, CKMB, TROIQ, BNPP in the last 72 hours.     Hemodynamics:   PAP:   CO:     Wedge:   CI:     CVP:    SVR:       PVR:       Ventilator Settings:  Mode Rate Tidal Volume Pressure FiO2 PEEP   Assist control   400 ml    100 % 16 cm H20 (per MD)     Peak airway pressure: 31 cm H2O    Minute ventilation: 9 l/min        MEDS: Reviewed    Chest X-Ray:  CXR Results  (Last 48 hours)               08/26/21 1938  XR CHEST PORT Final result    Impression:  Right picc line tip over the distal superior vena cava. No pneumothorax. Diffuse bilateral interstitial and airspace opacities. Narrative:  INDICATION: tube placement       EXAM:  AP CHEST RADIOGRAPH       COMPARISON: 8/25/2021       FINDINGS:       AP portable view of the chest demonstrates placement of a right PICC line with   the tip over the distal superior vena cava. Endotracheal tube is in satisfactory   position at the thoracic inlet. Nasogastric tube tip over the stomach. Diffuse   bilateral interstitial and airspace opacities again noted. No pneumothorax. Assessment and Plan:   Acute hypoxic respiratory failure secondary to COVID-19 Pneumonia: Admitted to the hospital 8/14. Initial symptoms 8/8. Unvaccinated. He was started on dexamethasone, remdesivir, and tocilizumab. He was admitted to the ICU on 8/15 for worsening hypoxia and required BiPAP. Initially went to floor on 8/18, but back to ICU on 8/22 for worsening hypoxia. Intubated on August 25  Adjust mechanical ventilation, goal is for protective ventilatory strategy.  Tidal volume of 6 to 8mL/kg of ideal body weight [around 450 cc for this gentleman]. Nitric oxide added on August 26  Patient did not tolerate proning [led to significant hypoxia and hemodynamic compromise]  - Continue empiric Cefepime 8/22 - present  - MRSA swab NEG - stopped Vanc  - Continue methylpred  - CXR  - Lovenox ppx   - DVT scan NEG & has received Toci     NABEEL with hyperkalemia:  Worsened on August 27. Potassium is up to 6.8 but no rhythm changes. He is also negative balance. Received calcium, insulin. I will start Marcine Ng, give 1 L bolus, adjust tube feeding to low potassium. Recheck electrolytes at 1400-hour and consult nephrology.      Moderate protein calorie malnutrition:  Discontinue TPN, start patient on tube feeding on August 25.     GI and DVT prophylaxis, ventilator bundle. DISPOSITION  Stay in ICU    CRITICAL CARE CONSULTANT NOTE  I had a face to face encounter with the patient, reviewed and interpreted patient data including clinical events, labs, images, vital signs, I/O's, and examined patient. I have discussed the case and the plan and management of the patient's care with the consulting services, the bedside nurses and the respiratory therapist.      NOTE OF PERSONAL INVOLVEMENT IN CARE   This patient has a high probability of imminent, clinically significant deterioration, which requires the highest level of preparedness to intervene urgently. I participated in the decision-making and personally managed or directed the management of the following life and organ supporting interventions that required my frequent assessment to treat or prevent imminent deterioration. I personally spent 75 minutes of critical care time. This is time spent at this critically ill patient's bedside actively involved in patient care as well as the coordination of care and discussions with the patient's family. This does not include any procedural time which has been billed separately. Ramandeep Jimenez M.D.   Staff Intensivist/Pulmonologist  [facility]  8/27/2021

## 2021-08-27 NOTE — PROGRESS NOTES
Bedside shift change report given to Dwayne Edmonds RN (oncoming nurse) by Haseeb Kelly RN (offgoing nurse). Report included the following information SBAR, Kardex, ED Summary, Procedure Summary, Intake/Output, MAR, Recent Results, Med Rec Status, Cardiac Rhythm NSR, Alarm Parameters , Quality Measures and Dual Neuro Assessment. 0800: Pt's ventilator settings adjusted by Rain RT with Dr. Michael Goins at bedside based off am abg results. Pt PC 18, AC 24, peep 16, O2 100%. 0224: Dr. Kaylah Cruz at bedside for nephrology consult, orders received to recheck labs at 1200.     1150: Pt's girlfriend came to hospital to collect all of pt's belongings. Two bags were taken down to pt's girlfriend by Kelly Hernandez RN.     1300: Pt's labs resulted, discussed with Dr. Michael Goins, orders received for IV insulin, dextrose, calcium gluconate and recheck labs and [id number]: ABG results discussed with Rain ATKINS, RN and Dr. Michael Goins, orders received to adjust pt's ventilator rate to 28, decrease nitric from 40 to 30 and recheck ABG at 1800.     1900: Based off ABG results, per Dr. Michael Goins wean nitric overnight as tolerated to keep O2 above 92%, RT notified. Bedside shift change report given to Dwayne Edmonds RN (oncoming nurse) by Haseeb Kelly RN (offgoing nurse). Report included the following information SBAR, Kardex, ED Summary, Procedure Summary, Intake/Output, MAR, Recent Results, Med Rec Status, Cardiac Rhythm NSR, Alarm Parameters , Quality Measures and Dual Neuro Assessment.

## 2021-08-27 NOTE — PROGRESS NOTES
Day #5 of Cefepime - Renal Dosing Update  Indication:  Possible PNA in COVID-19 patient - worsening leukocytosis and fevers  Current regimen:  2 gm IV Q 8 hr  Abx regimen: Monotherapy  Recent Labs     21  1153 21  0342 21  0505 21  0457 21  0457   WBC  --  21.6* 15.7*  --  21.4*   CREA 2.07* 1.94* 1.15   < > 1.08   BUN 42* 38* 29*   < > 31*    < > = values in this interval not displayed. Est CrCl: ~50-60 ml/min; UO: >1 ml/kg/hr  Temp (24hrs), Av.2 °F (37.9 °C), Min:99.9 °F (37.7 °C), Max:100.5 °F (38.1 °C)    Cultures:    COVID-19 [rapid]: (+)   Blood: NG, final  8/15 Legionella Ag [Ur]: (-)   MRSA screen: (-)   Blood: NG, final   Sputum: heavy normal frantz, final    Plan: Given the patient's worsening Scr, the dose of cefepime has been adjusted to 2 gm IV Q 12 hr per [facility] [place] P&T Committee Protocol with respect to renal function. Pharmacy will continue to monitor patient daily and will make dosage adjustments based upon changing renal function.

## 2021-08-28 NOTE — PROGRESS NOTES
Renal Dosing/Monitoring  Medication: famotidine   Current regimen:  20 mg IV every 12 hr  Recent Labs     08/28/21  0540 08/28/21  0041 08/27/21  1756   CREA 2.67* 2.64* 2.35*   BUN 54* 51* 47*     Estimated CrCl:  43.2 ml/min  Plan: Change to 20 mg IV every 24 hours per [facility] P&T Committee Protocol with respect to renal function. Pharmacy will continue to monitor patient daily and will make dosage adjustments based upon changing renal function.       Myra Stokes, PharmD, BCPP, [facility] Specialist, [place]

## 2021-08-28 NOTE — PROGRESS NOTES
[facility]    ICU TEAM Progress Note    Name: Deysi Pina   : 1975   MRN: [id number]   Date: 2021      Assessment:   Reason for ICU Admission: Acute hypoxic respiratory failure secondary to COVID-19 Pneumonia     Brief HPI: 52 yo male who presented to the ED on  with no prior PMH with SOB x2 days. [facility] had developed body aches and fever as of .  He was diagnosed with COVID-19 pneumonia.  He was not vaccinated. He was started on dexamethasone, remdesivir -, and tocilizumab . He was admitted to the ICU on 8/15 for worsening hypoxia and required BiPAP.  He remained in the ICU from 8/15- on BiPAP.  He weaned from 100% to 80%  and was able to get himself from bed to chair. He was transferred to the [facility] on . FIO2 further decreased to 70% on . Hi Flow was attempted , but he quickly desaturated to 72%.  He was placed back on BiPAP at 80% FIO2 (IPAP/EPAP not listed).  CXR on  noted to have increased infiltrates. [facility] was able to take a break from BiPAP on  to eat, transitioning from HiFlow, but again became hypoxic, needing to go back to BiPAP. Plan:     Neuro: versed, fentanyl, precedex, propofol, nimbex  Pulm:   - Intubated on   - Adjust mechanical ventilation, goal is for protective ventilatory strategy.  Tidal volume of 6 to 8mL/kg of ideal body weight [450cc goal]. - Keep deeply sedated RASS -3, start Nimbex to ensure vent synchrony  - Continue methylpred  - Diuresis as needed  - DVT scan NEG & has received Toci  Cardiac: Remains HDS. Renal: Hyperkalemia - on brandt lokema. Rec'd insulin, D50, les gluc. Creatinine plateaued, good urine output.  Trend BMP  GI: Nepro TFs given hyperkalemia. Senna. Triglycerides up on   ID: Continue empiric Cefepime  - present. MRSA swab NEG - stopped Vanc.  Following cultures from  and   Heme: Lovenox  Endo: Continue methylpred  MSK:  nimbex    F - Feeding:  Yes   A - Analgesia: Fentanyl  S - Sedation: Propofol, Precedex and Versed  T - DVT Prophylaxis: SCD's or Sequential Compression Device and Lovenox   C - Code Status: Full Code  H - Head of Bed: > 30 Degrees  U - Ulcer Prophylaxis: Pepcid (famotidine)   G - Glycemic Control: Insulin  S - Spontaneous Breathing Trial: No  B - Bowel Regimen: Senna  I - Indwelling Catheter:   Tubes: ETT and Orogastric Tube  Lines: Peripheral IV, Arterial Line and Central Line  Drains: Brownlee Catheter  D - De-escalation of Antibiotics: cefepime    Subjective:   Overnight Events:   2021 Hyperkalemia marginally better with lokema. Liver enzymes improving.   : Patient condition worsened when he was placed in prone position. Led to significant hypoxia. Improved with positioning him back in supine position and adjusting mechanical ventilation. Remained heavily sedated and paralyzed  : Remains tenuous, no improvement in oxygen requirement or ventilatory support.  Requiring higher doses for sedation  : Intubated early morning on the .  Sedated. Objective:     Visit Vitals  /81   Pulse 65   Temp 99.8 °F (37.7 °C)   Resp 28   Ht 5' 10\" (1.778 m)   Wt 113 kg (249 lb 1.9 oz)   SpO2 95%   BMI 35.74 kg/m²    O2 Flow Rate (L/min): 60 l/min O2 Device: Ventilator, Endotracheal tube Temp (24hrs), Av.3 °F (37.9 °C), Min:99.6 °F (37.6 °C), Max:101.2 °F (38.4 °C)     Ventilator Settings:  Mode Rate Tidal Volume Pressure FiO2 PEEP   Pressure control, Assist control   400 ml    100 % (increase d/t desaturation event) 12 cm H20 (weaned after ABG per protocol, RN notified)     Peak airway pressure: 35 cm H2O    Minute ventilation: 15.1 l/min        Intake/Output:     Intake/Output Summary (Last 24 hours) at 2021 0749  Last data filed at 2021 0400  Gross per 24 hour   Intake 4039.98 ml   Output 3475 ml   Net 564.98 ml     Physical Exam:  General:   Sedated intubated on mechanical ventilation   Eyes:  Sclera anicteric.  Pupils equally round and reactive to light. Mouth/Throat: Mucous membranes normal, oral pharynx clear, ET tube, OG tube   Neck: Supple   Lungs:    Coarse crepitation bilaterally on auscultation   CV:  Regular rate and rhythm   Abdomen:   Soft, non-tender. bowel sounds normal. non-distended   Lines/Devices:  Intact, no erythema, drainage or tenderness   Psych:  Deeply sedated, withdraw to painful stimuli     24h Labs & Data: Reviewed    Medications: Reviewed    Chest X-Ray: pending for today    8/21 TTE: LVEF is 65 - 70%. Normal cavity size, systolic function (ejection fraction normal) and diastolic function. Mild concentric hypertrophy. Wall motion: normal. Normal left ventricular strain. Multidisciplinary Rounds Completed:  No    ABCDEF Bundle/Checklist Completed: Yes    SPECIAL EQUIPMENT: None    DISPOSITION: Stay in ICU    CRITICAL CARE CONSULTANT NOTE  I had a face to face encounter with the patient, reviewed and interpreted patient data including clinical events, labs, images, vital signs, I/O's, and examined patient. I have discussed the case and the plan and management of the patient's care with the consulting services, the bedside nurses and the respiratory therapist.      NOTE OF PERSONAL INVOLVEMENT IN CARE   This patient has a high probability of imminent, clinically significant deterioration, which requires the highest level of preparedness to intervene urgently. I participated in the decision-making and personally managed or directed the management of the following life and organ supporting interventions that required my frequent assessment to treat or prevent imminent deterioration. I personally spent 45 minutes of critical care time. This is time spent at this critically ill patient's bedside actively involved in patient care as well as the coordination of care and discussions with the patient's family. This does not include any procedural time which has been billed separately.     Loin Celis, MD  Staff Intensivist/Anesthesiologist  [facility]  8/28/2021

## 2021-08-28 NOTE — PROGRESS NOTES
Nephrology Progress Note  [address]     [facility]. [facility]  Phone - [phone]   Patient: Loretta Hernández    YOB: 1975        Date- 8/28/2021   Admit Date: 8/14/2021  CC: Follow up for  NABEEL         IMPRESSION & PLAN:   · acute kidney injury due to ATN - hypotension +/- vanco +/- covid 19 related  · Hyperkalemia  · resp failure  · covid 19- pneumonia  · Protein malnutrition     PLAN-  · Continue on lokelma 10 gram daily scheduled dose  · Will add chlorthalidone 25 mg daily to help with hyperkalemia and HTN  · Excellent UOP,no need for RRT at this time. · Check bmp at noon     Subjective: Interval History:   -  Seen from outside the room  - intubated and sedated HTNsive this am.    Objective:   Vitals:    08/28/21 0956 08/28/21 1000 08/28/21 1100 08/28/21 1117   BP:  138/83 132/82    Pulse:  67 68 69   Resp:  28 28 28   Temp:       SpO2:  (!) 88% (!) 87% (!) 89%   Weight: 113.9 kg (251 lb 1.7 oz)      Height:          08/27 0701 - 08/28 0700  In: 4040 [I.V.:3320]  Out: 3024 [Urine:3475]  Last 3 Recorded Weights in this Encounter    08/26/21 0943 08/27/21 0500 08/28/21 0956   Weight: 109.8 kg (242 lb 1 oz) 113 kg (249 lb 1.9 oz) 113.9 kg (251 lb 1.7 oz)      Physical exam:   Exam was not done to conserve PPE and to prevent exposure    Chart reviewed. Pertinent Notes reviewed. Data Review :  Recent Labs     08/28/21  0540 08/28/21  0041 08/27/21  1756 08/27/21  1153 08/27/21  0342 08/26/21  0505 08/26/21  0505    140 139   < > 136   < > 136   K 5.7* 5.7* 6.2*   < > 6.8*   < > 5.4*   * 107 109*   < > 106   < > 106   CO2 29 29 26   < > 31   < > 26   BUN 54* 51* 47*   < > 38*   < > 29*   CREA 2.67* 2.64* 2.35*   < > 1.94*   < > 1.15   * 183* 186*   < > 175*   < > 163*   CA 8.2* 8.4* 8.4*   < > 8.1*   < > 8.4*   MG 3.1*  --   --   --  3.1*  --  2.7*   PHOS 4.3  --   --   --  5.6*  --  3.4    < > = values in this interval not displayed.      Recent Labs 08/28/21  0540 08/27/21  0342 08/26/21  0505   WBC 14.4* 21.6* 15.7*   HGB 13.4 15.2 15.4   HCT 42.8 48.8 45.7   * 129* 125*     No results for input(s): FE, TIBC, PSAT, FERR in the last 72 hours.    Medication list  reviewed  Current Facility-Administered Medications   Medication Dose Route Frequency    [START ON 8/29/2021] famotidine (PF) (PEPCID) 20 mg in 0.9% sodium chloride 10 mL injection  20 mg IntraVENous Q24H    dextrose (D50W) injection syrg 12.5-25 g  12.5-25 g IntraVENous PRN    sodium zirconium cyclosilicate (LOKELMA) powder packet 10 g  10 g Oral Q8H    white petrolatum-mineral oiL (AKWA TEARS) 83-15 % ophthalmic ointment   Both Eyes Q12H    cefepime (MAXIPIME) 2 g in 0.9% sodium chloride (MBP/ADV) 100 mL MBP  2 g IntraVENous Q12H    midazolam in normal saline (VERSED) 1 mg/mL infusion  0-20 mg/hr IntraVENous TITRATE    balsam peru-castor oiL (VENELEX) ointment   Topical PRN    cisatracurium (NIMBEX) 2 mg/mL IV infusion (UNDILUTED)  0-10 mcg/kg/min IntraVENous TITRATE    propofol (DIPRIVAN) 10 mg/mL infusion  0-50 mcg/kg/min IntraVENous TITRATE    fentaNYL (PF) 1,500 mcg/30 mL (50 mcg/mL) infusion  0-300 mcg/hr IntraVENous TITRATE    chlorhexidine (ORAL CARE KIT) 0.12 % mouthwash 15 mL  15 mL Oral Q12H    docusate (COLACE) 50 mg/5 mL oral liquid 100 mg  100 mg Per NG tube DAILY    insulin lispro (HUMALOG) injection   SubCUTAneous Q6H    dexmedeTOMidine in 0.9 % NaCl (PRECEDEX) 400 mcg/100 mL (4 mcg/mL) infusion soln  0.1-1.5 mcg/kg/hr IntraVENous TITRATE    glucose chewable tablet 16 g  4 Tablet Oral PRN    dextrose (D50W) injection syrg 12.5-25 g  12.5-25 g IntraVENous PRN    glucagon (GLUCAGEN) injection 1 mg  1 mg IntraMUSCular PRN    melatonin tablet 3 mg  3 mg Oral QHS PRN    methylPREDNISolone (PF) (SOLU-MEDROL) injection 40 mg  40 mg IntraVENous Q6H    senna (SENOKOT) tablet 17.2 mg  2 Tablet Oral DAILY    sodium chloride (NS) flush 5-40 mL  5-40 mL IntraVENous Q8H    sodium chloride (NS) flush 5-40 mL  5-40 mL IntraVENous PRN    acetaminophen (TYLENOL) tablet 650 mg  650 mg Oral Q6H PRN    polyethylene glycol (MIRALAX) packet 17 g  17 g Oral DAILY PRN    ondansetron (ZOFRAN) injection 4 mg  4 mg IntraVENous Q6H PRN    enoxaparin (LOVENOX) injection 40 mg  40 mg SubCUTAneous Q12H          Charito Estrella, [address] [facility]  [facility] / DO AND [facility], [address]  [place], [address]  Phone - [phone]               Fax - [phone]

## 2021-08-28 NOTE — PROGRESS NOTES
Bedside shift change report given to Kayleigh Gandhi RN (oncoming nurse) by Claribel Kohli RN (offgoing nurse). Report included the following information SBAR, Kardex, ED Summary, Procedure Summary, Intake/Output, MAR, Recent Results, Med Rec Status, Cardiac Rhythm NSR, Alarm Parameters , Quality Measures and Dual Neuro Assessment. 4763: Dr. Ashish Saunders notified of pt's am lab results, MD to look at pt.     1000: Pt's O2 decreasing to 86% when turning, Dr. Tomas Dill aware. 1030: Pt's O2 maintaining at 86% and not recovering, Dr. Tomas Dill notified, orders received to increase nitric to 40, RT notified. 1640: Pt's peep increased to 16 per Rain RT. Bedside shift change report given to Anirudh Joshi RN (oncoming nurse) by Kayleigh Gandhi RN (offgoing nurse). Report included the following information SBAR, Kardex, ED Summary, Procedure Summary, Intake/Output, MAR, Recent Results, Med Rec Status, Cardiac Rhythm NSR, Alarm Parameters , Quality Measures and Dual Neuro Assessment.

## 2021-08-29 NOTE — PROGRESS NOTES
Pharmacist Note - Vancomycin Dosing    Consult provided for this 51 y.o. male for indication of COVID-19 related pneumonia. Antibiotic regimen(s): cefepime + vanc  Patient on vancomycin PTA? Pt was on vanc earlier during stay (last dose )     Recent Labs     21  1149 21  0623 21  2319 21  1150 21  0540 21  1153 21  0342   WBC  --  16.0*  --   --  14.4*  --  21.6*   CREA 2.56* 2.41* 2.52*   < > 2.67*   < > 1.94*   BUN 82* 73* 65*   < > 54*   < > 38*    < > = values in this interval not displayed. Frequency of BMP: daily  Height: 177.8 cm  Weight: 116.2 kg  Est CrCl: 45.6 ml/min  Temp (24hrs), Av.3 °F (36.8 °C), Min:97.8 °F (36.6 °C), Max:98.9 °F (37.2 °C)    Cultures:  Last blood cultures collected  (NGTD)    MRSA Swab ordered (if applicable)? NO -- MRSA swab ordered  (MRSA not present)    The plan below is expected to result in a target range of Trough 10-15 mcg/mL    Therapy will be initiated with a loading dose of 2500 mg IV x 1. Pharmacy to follow patient daily and order levels / make dose adjustments as appropriate.

## 2021-08-29 NOTE — PROGRESS NOTES
[facility]    ICU TEAM Progress Note    Name: Maya Sauer   : 1975   MRN: [id number]   Date: 2021      Assessment:   Reason for ICU Admission: Acute hypoxic respiratory failure secondary to COVID-19 Pneumonia     Brief HPI: 52 yo male who presented to the ED on  with no prior PMH with SOB x2 days. Rose Beauchamp had developed body aches and fever as of .  He was diagnosed with COVID-19 pneumonia.  He was not vaccinated. He was started on dexamethasone, remdesivir -, and tocilizumab . He was admitted to the ICU on 8/15 for worsening hypoxia and required BiPAP.  He remained in the ICU from 8/15- on BiPAP.  He weaned from 100% to 80%  and was able to get himself from bed to chair. He was transferred to the [facility] on . FIO2 further decreased to 70% on . Hi Flow was attempted , but he quickly desaturated to 72%.  He was placed back on BiPAP at 80% FIO2 (IPAP/EPAP not listed).  CXR on  noted to have increased infiltrates. Rose Beauchamp was able to take a break from BiPAP on  to eat, transitioning from HiFlow, but again became hypoxic, needing to go back to BiPAP. Plan:     Neuro: versed, fentanyl, precedex, propofol, nimbex  Pulm:   - Intubated on   - Mechanical ventilation, goal is for protective ventilatory strategy.  Tidal volume of 6 to 8mL/kg of ideal body weight [450cc goal]. Continue bilevel. Follow up ABG at noon. - Keep deeply sedated RASS -3, start Nimbex to ensure vent synchrony  - Continue methylpred  - Diuresis as needed  - DVT scan NEG & has received Toci  Cardiac: Remains HDS  Renal: Hyperkalemia - resolved. Creatinine plateaued, good urine output.   GI: Nepro TFs given hyperkalemia. Senna. Triglycerides up on . ID: Continue empiric Cefepime  - present. MRSA swab NEG - stopped Vanc.  Following cultures from  and   Heme: Lovenox  Endo: Continue methylpred  MSK:  nimbex    F - Feeding:  Yes   A - Analgesia: Fentanyl  S - Sedation: Propofol, Precedex and Versed  T - DVT Prophylaxis: SCD's or Sequential Compression Device and Lovenox   C - Code Status: Full Code  H - Head of Bed: > 30 Degrees  U - Ulcer Prophylaxis: Pepcid (famotidine)   G - Glycemic Control: Insulin  S - Spontaneous Breathing Trial: No  B - Bowel Regimen: Senna  I - Indwelling Catheter:   Tubes: ETT and Orogastric Tube  Lines: Peripheral IV, Arterial Line and Central Line  Drains: Brownlee Catheter  D - De-escalation of Antibiotics: cefepime    Subjective:   Overnight Events:   2021 hypoxia and worsened hypercapnia overnight. Placed on bilevel. : Hyperkalemia marginally better with lokema. Liver enzymes improving.   : Patient condition worsened when he was placed in prone position. Led to significant hypoxia. Improved with positioning him back in supine position and adjusting mechanical ventilation. Remained heavily sedated and paralyzed  : Remains tenuous, no improvement in oxygen requirement or ventilatory support.  Requiring higher doses for sedation  : Intubated early morning on the .  Sedated. Objective:     Visit Vitals  /64   Pulse 73   Temp 98.2 °F (36.8 °C)   Resp 24   Ht 5' 10\" (1.778 m)   Wt 113.9 kg (251 lb 1.7 oz)   SpO2 96%   BMI 36.03 kg/m²    O2 Flow Rate (L/min): 60 l/min O2 Device: Ventilator, Endotracheal tube Temp (24hrs), Av.5 °F (36.9 °C), Min:98.2 °F (36.8 °C), Max:98.9 °F (37.2 °C)     Ventilator Settings:  Mode Rate Tidal Volume Pressure FiO2 PEEP   Bilevel   400 ml    100 % 16 cm H20     Peak airway pressure: 37 cm H2O    Minute ventilation: 6.04 l/min        Intake/Output:     Intake/Output Summary (Last 24 hours) at 2021 0705  Last data filed at 2021 0600  Gross per 24 hour   Intake 4184.07 ml   Output 2400 ml   Net 1784.07 ml     Physical Exam:  General:   Sedated intubated on mechanical ventilation   Eyes:  Sclera anicteric. Pupils equally round and reactive to light.    Mouth/Throat: Mucous membranes normal, oral pharynx clear, ET tube, OG tube   Neck: Supple   Lungs:    Coarse crepitation bilaterally on auscultation   CV:  Regular rate and rhythm   Abdomen:   Soft, non-tender. bowel sounds normal. non-distended   Lines/Devices:  Intact, no erythema, drainage or tenderness   Psych:  Deeply sedated, withdraw to painful stimuli     24h Labs & Data: Reviewed    Medications: Reviewed    Chest X-Ray: pending for today    8/21 TTE: LVEF is 65 - 70%. Normal cavity size, systolic function (ejection fraction normal) and diastolic function. Mild concentric hypertrophy. Wall motion: normal. Normal left ventricular strain. Multidisciplinary Rounds Completed:  No    ABCDEF Bundle/Checklist Completed: Yes    SPECIAL EQUIPMENT: None    DISPOSITION: Stay in ICU    CRITICAL CARE CONSULTANT NOTE  I had a face to face encounter with the patient, reviewed and interpreted patient data including clinical events, labs, images, vital signs, I/O's, and examined patient. I have discussed the case and the plan and management of the patient's care with the consulting services, the bedside nurses and the respiratory therapist.      NOTE OF PERSONAL INVOLVEMENT IN CARE   This patient has a high probability of imminent, clinically significant deterioration, which requires the highest level of preparedness to intervene urgently. I participated in the decision-making and personally managed or directed the management of the following life and organ supporting interventions that required my frequent assessment to treat or prevent imminent deterioration. I personally spent 40 minutes of critical care time. This is time spent at this critically ill patient's bedside actively involved in patient care as well as the coordination of care and discussions with the patient's family. This does not include any procedural time which has been billed separately.     Lois Calabrese MD  Staff Intensivist/Anesthesiologist  [facility]  8/29/2021

## 2021-08-29 NOTE — PROGRESS NOTES
Bedside shift change report given to Armaan Ba RN (oncoming nurse) by Marcus Deshpande RN (offgoing nurse). Report included the following information SBAR, Kardex, ED Summary, Procedure Summary, Intake/Output, MAR, Recent Results, Med Rec Status, Cardiac Rhythm NSR, Alarm Parameters , Quality Measures and Dual Neuro Assessment. 1200: Pt switched back to pressure control and nitric weaned to 40 by Rain RT, per Dr. Hu Morales post ABG. 1300: Pt's BP increased, Dr. Hu Morales notified, orders received for prn versed. 1345: BP still elevated, Dr. Hu Morales notified, orders received for prn hydralazine. 1415: Orders received for MRSA swab, paired blood cultures and sputum sample, pt has no sputum through ETT, unable to send sample at this time. 1645: Nitric weaned to 30 by Rain RT post ABG results per Dr. Hu Morales. Bedside shift change report given to Elsa Benson RN (oncoming nurse) by Armaan Ba RN (offgoing nurse). Report included the following information SBAR, Kardex, ED Summary, Procedure Summary, Intake/Output, MAR, Recent Results, Med Rec Status, Cardiac Rhythm NSR, Alarm Parameters , Quality Measures and Dual Neuro Assessment.

## 2021-08-29 NOTE — PROGRESS NOTES
1930: Bedside and Verbal shift change report given to [address]  (oncoming nurse) by Tomasz Fowler RN (offgoing nurse). Report included the following information SBAR, Kardex, Procedure Summary, Intake/Output, MAR, Recent Results, Cardiac Rhythm NSR and Alarm Parameters . 2200: HOB lowered to 35 degrees for turn. O2 sats dropped to 79%. Notified RT. Per RT, pt recovers slowly and is at highest ventilator and nitric support. 2300: O2 sats recovered to 88-89%. 2335: O2 sats dropped to 86%. Notified RT.     0000Myrene Gearing NP at bedside evaluating pt. Discussed adjusting ventilator settings. No orders received. 0030: RT at bedside adjusting ventilator settings. Pt placed on bilevel. 0415: O2 sats dropped to 82%. Notified Mary Ng NP and RT. Pt adjusted in bed and RT increased nitric support to 50.    0445: Dr. Donita Tyler at bedside adjusting ventilator settings, increased T High to 2.28.    0630: Notified Dr. Donita Tyler of critical ABG results after ventilator settings adjusted. 8064: Dr. Donita Tyler at bedside, RR increased to 27 and decreased T High to 2.02.    0730: Bedside and Verbal shift change report given to Ila (oncoming nurse) by Tere Weinstein RN (offgoing nurse). Report included the following information SBAR, Kardex, Intake/Output, MAR, Recent Results, Cardiac Rhythm NSR and Alarm Parameters .

## 2021-08-29 NOTE — PROGRESS NOTES
Nephrology Progress Note  [address]     www. "[employer]  Phone - [phone]   Patient: Sami Cespedes    YOB: 1975        Date- 8/29/2021   Admit Date: 8/14/2021  CC: Follow up for  NABEEL         IMPRESSION & PLAN:   · acute kidney injury due to ATN - hypotension +/- vanco +/- covid 19 related  · Hyperkalemia  · resp failure  · covid 19- pneumonia  · Protein malnutrition     PLAN-  · Will make lokelma dose prn only  · Will continue on chlorthalidone 25 mg   · BP and hyperkalemia both are already better  · Excellent UOP,no need for RRT at this time. · Check bmp at noon     Subjective: Interval History:   -  Seen from outside the room  - intubated and sedated HTNsive this am.    Objective:   Vitals:    08/29/21 0800 08/29/21 0808 08/29/21 0900 08/29/21 1000   BP: 116/64  115/63 120/64   Pulse: 79 82 78 78   Resp: 27 27 27 27   Temp: 98 °F (36.7 °C)      SpO2: 96% 96% 98% 97%   Weight:       Height:          08/28 0701 - 08/29 0700  In: 4596.6 [I.V.:3216.6]  Out: 2400 [Urine:2400]  Last 3 Recorded Weights in this Encounter    08/26/21 0943 08/27/21 0500 08/28/21 0956   Weight: 109.8 kg (242 lb 1 oz) 113 kg (249 lb 1.9 oz) 113.9 kg (251 lb 1.7 oz)      Physical exam:   Exam was not done to conserve PPE and to prevent exposure    Chart reviewed. Pertinent Notes reviewed. Data Review :  Recent Labs     08/29/21  0623 08/28/21  2319 08/28/21  1735 08/28/21  1150 08/28/21  0540 08/27/21  1153 08/27/21  0342    140 140   < > 141   < > 136   K 4.5 4.5 4.6   < > 5.7*   < > 6.8*   * 109* 109*   < > 109*   < > 106   CO2 29 27 26   < > 29   < > 31   BUN 73* 65* 62*   < > 54*   < > 38*   CREA 2.41* 2.52* 2.54*   < > 2.67*   < > 1.94*   * 236* 215*   < > 201*   < > 175*   CA 8.3* 8.2* 8.3*   < > 8.2*   < > 8.1*   MG 3.1*  --   --   --  3.1*  --  3.1*   PHOS 5.1* 3.9 4.0   < > 4.3  --  5.6*    < > = values in this interval not displayed.      Recent Labs 08/29/21  0623 08/28/21  0540 08/27/21  0342   WBC 16.0* 14.4* 21.6*   HGB 13.2 13.4 15.2   HCT 41.8 42.8 48.8   * 140* 129*     No results for input(s): FE, TIBC, PSAT, FERR in the last 72 hours.    Medication list  reviewed  Current Facility-Administered Medications   Medication Dose Route Frequency    famotidine (PF) (PEPCID) 20 mg in 0.9% sodium chloride 10 mL injection  20 mg IntraVENous Q24H    chlorthalidone (HYGROTON) tablet 25 mg  25 mg Oral DAILY    dextrose (D50W) injection syrg 12.5-25 g  12.5-25 g IntraVENous PRN    white petrolatum-mineral oiL (AKWA TEARS) 83-15 % ophthalmic ointment   Both Eyes Q12H    cefepime (MAXIPIME) 2 g in 0.9% sodium chloride (MBP/ADV) 100 mL MBP  2 g IntraVENous Q12H    midazolam in normal saline (VERSED) 1 mg/mL infusion  0-20 mg/hr IntraVENous TITRATE    balsam peru-castor oiL (VENELEX) ointment   Topical PRN    cisatracurium (NIMBEX) 2 mg/mL IV infusion (UNDILUTED)  0-10 mcg/kg/min IntraVENous TITRATE    propofol (DIPRIVAN) 10 mg/mL infusion  0-50 mcg/kg/min IntraVENous TITRATE    fentaNYL (PF) 1,500 mcg/30 mL (50 mcg/mL) infusion  0-300 mcg/hr IntraVENous TITRATE    chlorhexidine (ORAL CARE KIT) 0.12 % mouthwash 15 mL  15 mL Oral Q12H    docusate (COLACE) 50 mg/5 mL oral liquid 100 mg  100 mg Per NG tube DAILY    insulin lispro (HUMALOG) injection   SubCUTAneous Q6H    dexmedeTOMidine in 0.9 % NaCl (PRECEDEX) 400 mcg/100 mL (4 mcg/mL) infusion soln  0.1-1.5 mcg/kg/hr IntraVENous TITRATE    glucose chewable tablet 16 g  4 Tablet Oral PRN    dextrose (D50W) injection syrg 12.5-25 g  12.5-25 g IntraVENous PRN    glucagon (GLUCAGEN) injection 1 mg  1 mg IntraMUSCular PRN    melatonin tablet 3 mg  3 mg Oral QHS PRN    methylPREDNISolone (PF) (SOLU-MEDROL) injection 40 mg  40 mg IntraVENous Q6H    senna (SENOKOT) tablet 17.2 mg  2 Tablet Oral DAILY    sodium chloride (NS) flush 5-40 mL  5-40 mL IntraVENous Q8H    sodium chloride (NS) flush 5-40 mL 5-40 mL IntraVENous PRN    acetaminophen (TYLENOL) tablet 650 mg  650 mg Oral Q6H PRN    polyethylene glycol (MIRALAX) packet 17 g  17 g Oral DAILY PRN    ondansetron (ZOFRAN) injection 4 mg  4 mg IntraVENous Q6H PRN    enoxaparin (LOVENOX) injection 40 mg  40 mg SubCUTAneous Q12H          Aidee Steward MD              [facility]  [facility] / DO AND REM[facility] [place] [facility] [address], [address]  [place], [address] [place]  Phone - [phone]               Fax - [phone]

## 2021-08-30 NOTE — PROGRESS NOTES
[facility]   [address] [facility], [address] [place] [address], [facility]  Phone: [phone]   [phone]       Nephrology Progress Note  William Valentine     1975     [id number]  Date of Admission : 8/14/2021 08/30/21    CC: Follow up for NABEEL    Assessment and Plan   NABEEL :  - presumed to be 2/2 COVID illness.  - Cr trending down and has good UOP   - continue chlorthalidone   - hold loop diuretics for now   - avoid hypotension and all potential nephrotoxins   - labs daily     Hyperkalemia :  '- resolved     COVID 19 PNA   Acute resp failure: on vent (8/25)  - On Shalini   - on Cefepime   - received Tocilizumab and remdesivir   - per CCM          Interval History:  Seen from outside the room through glass dors. Discussed w/ night RN   Hemodynamically stable. Excellent UOP. Non pitting edema   Remains on SHALINI     Review of Systems: Review of systems not obtained due to patient factors.     Current Medications:   Current Facility-Administered Medications   Medication Dose Route Frequency    methylPREDNISolone (PF) (SOLU-MEDROL) injection 20 mg  20 mg IntraVENous Q6H    hydrALAZINE (APRESOLINE) 20 mg/mL injection 20 mg  20 mg IntraVENous Q6H PRN    Vancomycin - pharmacy to dose   Other Rx Dosing/Monitoring    famotidine (PF) (PEPCID) 20 mg in 0.9% sodium chloride 10 mL injection  20 mg IntraVENous Q24H    chlorthalidone (HYGROTON) tablet 25 mg  25 mg Oral DAILY    dextrose (D50W) injection syrg 12.5-25 g  12.5-25 g IntraVENous PRN    white petrolatum-mineral oiL (AKWA TEARS) 83-15 % ophthalmic ointment   Both Eyes Q12H    midazolam in normal saline (VERSED) 1 mg/mL infusion  0-20 mg/hr IntraVENous TITRATE    balsam peru-castor oiL (VENELEX) ointment   Topical PRN    cisatracurium (NIMBEX) 2 mg/mL IV infusion (UNDILUTED)  0-10 mcg/kg/min IntraVENous TITRATE    propofol (DIPRIVAN) 10 mg/mL infusion  0-50 mcg/kg/min IntraVENous TITRATE    fentaNYL (PF) 1,500 mcg/30 mL (50 mcg/mL) infusion  0-300 mcg/hr IntraVENous TITRATE    chlorhexidine (ORAL CARE KIT) 0.12 % mouthwash 15 mL  15 mL Oral Q12H    docusate (COLACE) 50 mg/5 mL oral liquid 100 mg  100 mg Per NG tube DAILY    insulin lispro (HUMALOG) injection   SubCUTAneous Q6H    dexmedeTOMidine in 0.9 % NaCl (PRECEDEX) 400 mcg/100 mL (4 mcg/mL) infusion soln  0.1-1.5 mcg/kg/hr IntraVENous TITRATE    glucose chewable tablet 16 g  4 Tablet Oral PRN    dextrose (D50W) injection syrg 12.5-25 g  12.5-25 g IntraVENous PRN    glucagon (GLUCAGEN) injection 1 mg  1 mg IntraMUSCular PRN    melatonin tablet 3 mg  3 mg Oral QHS PRN    senna (SENOKOT) tablet 17.2 mg  2 Tablet Oral DAILY    sodium chloride (NS) flush 5-40 mL  5-40 mL IntraVENous Q8H    sodium chloride (NS) flush 5-40 mL  5-40 mL IntraVENous PRN    acetaminophen (TYLENOL) tablet 650 mg  650 mg Oral Q6H PRN    polyethylene glycol (MIRALAX) packet 17 g  17 g Oral DAILY PRN    ondansetron (ZOFRAN) injection 4 mg  4 mg IntraVENous Q6H PRN    enoxaparin (LOVENOX) injection 40 mg  40 mg SubCUTAneous Q12H      No Known Allergies    Objective:  Vitals:    Vitals:    08/30/21 0400 08/30/21 0500 08/30/21 0600 08/30/21 0700   BP: (!) 151/81 (!) 156/86 (!) 156/79 (!) 159/84   Pulse: 64 63 66 63   Resp: 30 30 30 30   Temp: 98.1 °F (36.7 °C)      SpO2: 98% 98% 98% 98%   Weight:       Height:         Intake and Output:  No intake/output data recorded.   08/28 1901 - 08/30 0700  In: 6820 [I.V.:4680]  Out: 0842 [Urine:4252]    Physical Examination:not examined on room   Pt intubated    Yes   General: Sedated on vent   Neck:  Supple, no mass  Resp:  Symmetrical chest movements  CV:  RRR on monitor   GI:  Non distended   Neurologic:  sedated  :  Brownlee     []    High complexity decision making was performed  []    Patient is at high-risk of decompensation with multiple organ involvement    Lab Data Personally Reviewed: I have reviewed all the pertinent labs, microbiology data and radiology studies during assessment. Recent Labs     08/30/21 0311 08/29/21  1149 08/29/21  0623 08/28/21  2319 08/28/21  1735 08/28/21  1150 08/28/21  0540 08/28/21  0041 08/28/21  0041    141 143 140 140   < > 141   < > 140   K 4.4 5.1 4.5 4.5 4.6   < > 5.7*   < > 5.7*   * 108 110* 109* 109*   < > 109*   < > 107   CO2 23 27 29 27 26   < > 29   < > 29   * 231* 208* 236* 215*   < > 201*   < > 183*   BUN 85* 82* 73* 65* 62*   < > 54*   < > 51*   CREA 2.41* 2.56* 2.41* 2.52* 2.54*   < > 2.67*   < > 2.64*   CA 8.6 8.5 8.3* 8.2* 8.3*   < > 8.2*   < > 8.4*   MG 2.9*  --  3.1*  --   --   --  3.1*  --   --    PHOS 3.8 8.4* 5.1* 3.9 4.0   < > 4.3  --   --    ALB 2.3* 2.6* 2.4* 2.5* 2.3*   < > 2.3*   < > 2.4*   ALT 83*  --  101*  --   --   --  132*  --  142*    < > = values in this interval not displayed. Recent Labs     08/30/21 0311 08/29/21 0623 08/28/21  0540   WBC 14.3* 16.0* 14.4*   HGB 12.9 13.2 13.4   HCT 39.5 41.8 42.8   * 144* 140*     No results found for: SDES  Lab Results   Component Value Date/Time    Culture result: NO GROWTH AFTER 12 HOURS 08/29/2021 02:37 PM    Culture result: HEAVY NORMAL RESPIRATORY JESSICA 08/23/2021 07:06 AM    Culture result: NO GROWTH 5 DAYS 08/22/2021 08:51 PM    Culture result: MRSA NOT PRESENT 08/22/2021 03:41 PM    Culture result:  08/22/2021 03:41 PM     Screening of patient nares for MRSA is for surveillance purposes and, if positive, to facilitate isolation considerations in high risk settings. It is not intended for automatic decolonization interventions per se as regimens are not sufficiently effective to warrant routine use.      Recent Results (from the past 24 hour(s))   GLUCOSE, POC    Collection Time: 08/29/21 11:05 AM   Result Value Ref Range    Glucose (POC) 250 (H) 65 - 117 mg/dL    Performed by Joanie Galvan    RENAL FUNCTION PANEL    Collection Time: 08/29/21 11:49 AM   Result Value Ref Range    Sodium 141 136 - 145 mmol/L    Potassium 5.1 3.5 - 5.1 mmol/L Chloride 108 97 - 108 mmol/L    CO2 27 21 - 32 mmol/L    Anion gap 6 5 - 15 mmol/L    Glucose 231 (H) 65 - 100 mg/dL    BUN 82 (H) 6 - 20 MG/DL    Creatinine 2.56 (H) 0.70 - 1.30 MG/DL    BUN/Creatinine ratio 32 (H) 12 - 20      GFR est AA 33 (L) >60 ml/min/1.73m2    GFR est non-AA 27 (L) >60 ml/min/1.73m2    Calcium 8.5 8.5 - 10.1 MG/DL    Phosphorus 8.4 (H) 2.6 - 4.7 MG/DL    Albumin 2.6 (L) 3.5 - 5.0 g/dL   POC G3 - PUL    Collection Time: 08/29/21 11:58 AM   Result Value Ref Range    FIO2 (POC) 100 %    pH (POC) 7.10 (LL) 7.35 - 7.45      pCO2 (POC) 93.7 (H) 35.0 - 45.0 MMHG    pO2 (POC) 199 (H) 80 - 100 MMHG    HCO3 (POC) 29.1 (H) 22 - 26 MMOL/L    sO2 (POC) 99.2 (H) 92 - 97 %    Base deficit (POC) 3.4 mmol/L    Site RIGHT RADIAL      Device: ADULT VENT      Mode BILEVEL      Tidal volume 240 ml    Set Rate 27 bpm    Allens test (POC) Positive      Specimen type (POC) ARTERIAL      Critical value read back [facility] SYSTEM MD    CULTURE, BLOOD, PAIRED    Collection Time: 08/29/21  2:37 PM    Specimen: Blood   Result Value Ref Range    Special Requests: NO SPECIAL REQUESTS      Culture result: NO GROWTH AFTER 12 HOURS     BLOOD GAS, ARTERIAL    Collection Time: 08/29/21  4:31 PM   Result Value Ref Range    pH 7.44 7.35 - 7.45      PCO2 37 35 - 45 mmHg    PO2 149 (H) 80 - 100 mmHg    O2 SAT 99 (H) 92 - 97 %    BICARBONATE 25 22 - 26 mmol/L    BASE EXCESS 1.1 mmol/L    O2 METHOD VENT      FIO2 100 %    MODE ASSIST CONTROL      SET RATE 32      PEEP/CPAP 16.0      Sample source ARTERIAL      SITE RIGHT RADIAL      RANDALL'S TEST NOT APPLICABLE     GLUCOSE, POC    Collection Time: 08/29/21  5:00 PM   Result Value Ref Range    Glucose (POC) 211 (H) 65 - 117 mg/dL    Performed by [address] [place] [address], POC    Collection Time: 08/29/21 11:24 PM   Result Value Ref Range    Glucose (POC) 188 (H) 65 - 117 mg/dL    Performed by Jeromy Romo    PROCALCITONIN    Collection Time: 08/30/21  3:11 AM   Result Value Ref Range Procalcitonin 0.10 ng/mL   LACTIC ACID    Collection Time: 08/30/21  3:11 AM   Result Value Ref Range    Lactic acid 2.2 (HH) 0.4 - 2.0 MMOL/L   MAGNESIUM    Collection Time: 08/30/21  3:11 AM   Result Value Ref Range    Magnesium 2.9 (H) 1.6 - 2.4 mg/dL   PHOSPHORUS    Collection Time: 08/30/21  3:11 AM   Result Value Ref Range    Phosphorus 3.8 2.6 - 4.7 MG/DL   CBC W/O DIFF    Collection Time: 08/30/21  3:11 AM   Result Value Ref Range    WBC 14.3 (H) 4.1 - 11.1 K/uL    RBC 4.41 4.10 - 5.70 M/uL    HGB 12.9 12.1 - 17.0 g/dL    HCT 39.5 36.6 - 50.3 %    MCV 89.6 80.0 - 99.0 FL    MCH 29.3 26.0 - 34.0 PG    MCHC 32.7 30.0 - 36.5 g/dL    RDW 14.1 11.5 - 14.5 %    PLATELET 534 (L) 339 - 400 K/uL    MPV 11.5 8.9 - 12.9 FL    NRBC 0.0 0  WBC    ABSOLUTE NRBC 0.00 0.00 - 5.61 K/uL   METABOLIC PANEL, COMPREHENSIVE    Collection Time: 08/30/21  3:11 AM   Result Value Ref Range    Sodium 139 136 - 145 mmol/L    Potassium 4.4 3.5 - 5.1 mmol/L    Chloride 111 (H) 97 - 108 mmol/L    CO2 23 21 - 32 mmol/L    Anion gap 5 5 - 15 mmol/L    Glucose 219 (H) 65 - 100 mg/dL    BUN 85 (H) 6 - 20 MG/DL    Creatinine 2.41 (H) 0.70 - 1.30 MG/DL    BUN/Creatinine ratio 35 (H) 12 - 20      GFR est AA 35 (L) >60 ml/min/1.73m2    GFR est non-AA 29 (L) >60 ml/min/1.73m2    Calcium 8.6 8.5 - 10.1 MG/DL    Bilirubin, total 0.5 0.2 - 1.0 MG/DL    ALT (SGPT) 83 (H) 12 - 78 U/L    AST (SGOT) 20 15 - 37 U/L    Alk.  phosphatase 66 45 - 117 U/L    Protein, total 5.6 (L) 6.4 - 8.2 g/dL    Albumin 2.3 (L) 3.5 - 5.0 g/dL    Globulin 3.3 2.0 - 4.0 g/dL    A-G Ratio 0.7 (L) 1.1 - 2.2     BLOOD GAS W/ COOX    Collection Time: 08/30/21  5:30 AM   Result Value Ref Range    pH 7.43 7.35 - 7.45      PCO2 41 35 - 45 mmHg    PO2 204 (H) 80 - 100 mmHg    BICARBONATE 27 (H) 22 - 26 mmol/L    BASE EXCESS 2.3 mmol/L    Carboxy-Hgb 0.5 (L) 1 - 2 %    Methemoglobin 0.6 0 - 1.4 %    tHb 13.8 (L) 14 - 17 g/dL    Oxyhemoglobin 98.2 (H) 94 - 97 %    O2 SATURATION 99 95 - 99 %    O2 METHOD VENT      FIO2 100 %    MODE PRESSURE CONTROL      SET RATE 30      PEEP/CPAP 16.0      Sample source ARTERIAL      SITE RIGHT RADIAL      RANDALL'S TEST NO     GLUCOSE, POC    Collection Time: 08/30/21  6:04 AM   Result Value Ref Range    Glucose (POC) 216 (H) 65 - 117 mg/dL    Performed by Sharry Mohs            Total time spent with patient:  xxx   min. Care Plan discussed with:  Patient     Family      RN      Consulting Physician [address],         I have reviewed the flowsheets. Chart and Pertinent Notes have been reviewed. No change in PMH ,family and social history from Consult note.       Gilma Larson MD

## 2021-08-30 NOTE — PROGRESS NOTES
Comprehensive Nutrition Assessment    Type and Reason for Visit: Reassess    Nutrition Recommendations/Plan:      Reduce tube feeding rate to 25 ml/hr    Increase Senna to bid    Monitor TG weekly    Nutrition Assessment:    Pt admitted with Severe Sepsis d/t COVID 19 PNA. No PMHx. Acute hypoxic respiratory failure requiring BIPAP support-eventually requiring intubation (8/25). Pt remains sedated and paralyzed. Did not tolerate prone position. Oxygenation improved today. NABEEL 2/2 ATN; hyperkalemia resolved. TG elevated @ 303. Continue to monitor weekly. Pt being sedated with Propofol, Versed and Fentanyl; paralyzed since 8/26. Propofol @ current rate provides 840 calories and 76 gm fat per day. Tube feeding as ordered provides ~660 ml, 1530 calories, 143 gm protein, 16 mEq potassium and 1560 ml free water (tube feeding/flush) per day. Tube feeding and propofol calories combined: 2370 per day-exceeding pt's energy needs. Recommend reducing tube feeding rate to 25 ml/hr to reduce total combined calories to 2175 per day. Last BM 8/21. Discussed with intensivist. Pt currently on bowel regimen-may wish to increase. Pt unstable over the weekend-was not tolerate being turned (concern about ability to clean patient). Doing a little better today. Elevated BG d/t steroid-NPH ordered today. Malnutrition Assessment:  Malnutrition Status:   At risk for malnutrition (specify)    Context:  Acute illness     Findings of the 6 clinical characteristics of malnutrition:   Energy Intake:  1 - 75% or less of est energy req for 7 or more days  Weight Loss:  No significant weight loss     Body Fat Loss:  Unable to assess,     Muscle Mass Loss:  Unable to assess,    Fluid Accumulation:  No significant fluid accumulation,     Strength:  Not performed     Nutritionally Significant Medications:   Nimbex, Propofol, Versed, Fentanyl, Colace, Pepcid, Humalog, NPH, Solumedrol, Senna    Estimated Daily Nutrient Needs:  Energy (kcal): 2120 (20 kcal/kg); Weight Used for Energy Requirements: Current (106 kg)  Protein (g): 150 (2g/kg IBW); Weight Used for Protein Requirements: Ideal  Fluid (ml/day):  ; Method Used for Fluid Requirements: 1 ml/kcal    Nutrition Related Findings:       BM: 8/21  Edema: 2+ NP generalized, 1+ BUE, Trace BLE  Wounds:  None       Current Nutrition Therapies:   Diet: NPO  Tube Feeding: Nepro @ 30 ml/hr with 2 packets Prosource tid and 120 ml water flush q 4 hr  Additional Caloric Sources:   Propofol @ 31.8 ml/hg    Anthropometric Measures:  · Height:  5' 10\" (177.8 cm)  · Current Body Wt:  116.2 kg (256 lb 2.8 oz)   · Admission Body Wt:  247 lb 5.7 oz       · Ideal Body Wt:  166:  154.3 %   · BMI Categories:  Obese class 1 (BMI 30.0-34. 9)     Wt Readings from Last 10 Encounters:   08/29/21 116.2 kg (256 lb 2.8 oz)   06/10/18 116.6 kg (257 lb)   04/01/14 111.6 kg (246 lb)   10/23/13 100.6 kg (221 lb 12.8 oz)   08/29/13 86.2 kg (190 lb)   11/13/12 90.7 kg (200 lb)   05/03/12 90.7 kg (200 lb)   04/06/12 91.6 kg (202 lb)   12/15/11 86.2 kg (190 lb)       Nutrition Diagnosis:   · Inadequate oral intake related to impaired respiratory function as evidenced by intubation, nutrition support-enteral nutrition. Nutrition Interventions:   Food and/or Nutrient Delivery: Modify tube feeding  Nutrition Education and Counseling: No recommendations at this time  Coordination of Nutrition Care: Continue to monitor while inpatient, Interdisciplinary rounds    Goals:  EN to meet at least 85% estimated protein needs x 5-7 days. Nutrition Monitoring and Evaluation:   Behavioral-Environmental Outcomes: None identified  Food/Nutrient Intake Outcomes: Enteral nutrition intake/tolerance  Physical Signs/Symptoms Outcomes: GI status, Weight, Fluid status or edema, Biochemical data    Discharge Planning:     Too soon to determine     Danika Barrett RD CNSC  Contact: [employer]

## 2021-08-30 NOTE — PROGRESS NOTES
1930: Bedside and Verbal shift change report given to [address] (oncoming nurse) by Jose Duran RN (offgoing nurse). Report included the following information SBAR, Kardex, ED Summary, MAR, Recent Results, and Cardiac Rhythm NSR .

## 2021-08-30 NOTE — PROGRESS NOTES
0730: Bedside and Verbal shift change report given to Nabila RN (oncoming nurse) by Ang Cabrera RN (offgoing nurse). Report included the following information SBAR, Kardex, Intake/Output, MAR, Recent Results and Cardiac Rhythm NSR.     1930: Bedside and Verbal shift change report given to [address] (oncoming nurse) by Solitario López RN (offgoing nurse). Report included the following information SBAR, Kardex, Intake/Output, MAR, Recent Results and Cardiac Rhythm NSR.

## 2021-08-30 NOTE — PROGRESS NOTES
1930: Bedside and Verbal shift change report given to DELANO Stephens, RN (oncoming nurse) by Kelsey Minor. Paris Wasserman (offgoing nurse). Report included the following information SBAR, Kardex, ED Summary, Procedure Summary, Intake/Output, MAR, Accordion, Recent Results, Cardiac Rhythm NSR and Alarm Parameters . 0715: Overnight updates given to Dr. Matthew Hernandez outside of room. No new orders received at this time. 0730: Bedside and Verbal shift change report given to CARMEN Grant RN (oncoming nurse) by DELANO Stephens, TONA (offgoing nurse). Report included the following information SBAR, Kardex, ED Summary, Procedure Summary, Intake/Output, MAR, Accordion, Recent Results, Cardiac Rhythm NSR and Alarm Parameters . Shift Summary: Pt remains intubated on the ventilator, sedated, and paralyzed with Nimbex, Fentanyl, Propofol, Versed, and Precedex. Nitric weaned and remains on 100% FiO2. TF running at goal. UOP adequate for this shift. No BM.

## 2021-08-30 NOTE — PROGRESS NOTES
[facility]    ICU TEAM Progress Note    Name: Lisandra Holman   : 1975   MRN: [id number]   Date: 2021      Assessment:   Reason for ICU Admission: Acute hypoxic respiratory failure secondary to COVID-19 Pneumonia     Brief HPI: 52 yo male who presented to the ED on  with no prior PMH with SOB x2 days. Aiden Crane had developed body aches and fever as of .  He was diagnosed with COVID-19 pneumonia.  He was not vaccinated. He was started on dexamethasone, remdesivir -, and tocilizumab . He was admitted to the ICU on 8/15 for worsening hypoxia and required BiPAP.  He remained in the ICU from 8/15- on BiPAP.  He weaned from 100% to 80%  and was able to get himself from bed to chair. He was transferred to the [facility] on . FIO2 further decreased to 70% on . Hi Flow was attempted , but he quickly desaturated to 72%.  He was placed back on BiPAP at 80% FIO2 (IPAP/EPAP not listed).  CXR on  noted to have increased infiltrates. Aiden Crane was able to take a break from BiPAP on  to eat, transitioning from HiFlow, but again became hypoxic, needing to go back to BiPAP. Plan:     Neuro: versed, fentanyl, precedex, propofol, nimbex  Pulm:   - Intubated on   - Mechanical ventilation, goal is for protective ventilatory strategy.  Tidal volume of 6 to 8mL/kg of ideal body weight [450cc goal]. - ABGs improving. Wean Shalini today. Continue PSV  - Keep deeply sedated RASS -3, start Nimbex to ensure vent synchrony. Wean nimbex today. - Continue methylpred  - Diuresis as needed  - DVT scan NEG & has received Toci  Cardiac: Remains HDS  Renal: Hyperkalemia - resolved. Creatinine plateaued, good urine output.   GI: Nepro TFs given hyperkalemia. Senna. Triglycerides up on  - recheck today  ID: Continue empiric Cefepime  - present. Following cultures from  and . Recultured . Vanc restarted and MRSA swab pending.    Heme: Lovenox  Endo: Start methylpred taper  MSK:  Nimbex - wean today    F - Feeding:  Yes   A - Analgesia: Fentanyl  S - Sedation: Propofol, Precedex and Versed  T - DVT Prophylaxis: SCD's or Sequential Compression Device and Lovenox   C - Code Status: Full Code  H - Head of Bed: > 30 Degrees  U - Ulcer Prophylaxis: Pepcid (famotidine)   G - Glycemic Control: Insulin  S - Spontaneous Breathing Trial: No  B - Bowel Regimen: Senna  I - Indwelling Catheter:   Tubes: ETT and Orogastric Tube  Lines: Peripheral IV, Arterial Line and Central Line  Drains: Brownlee Catheter  D - De-escalation of Antibiotics: cefepime    Subjective:   Overnight Events:   2021 Improvement in oxygenation. Shalini wean ongoing.   : hypoxia and worsened hypercapnia overnight. Placed on bilevel. : Hyperkalemia marginally better with lokema. Liver enzymes improving.   : Patient condition worsened when he was placed in prone position. Led to significant hypoxia. Improved with positioning him back in supine position and adjusting mechanical ventilation. Remained heavily sedated and paralyzed  : Remains tenuous, no improvement in oxygen requirement or ventilatory support.  Requiring higher doses for sedation  : Intubated early morning on the .  Sedated.     Objective:     Visit Vitals  BP (!) 156/79   Pulse 66   Temp 98.1 °F (36.7 °C)   Resp 30   Ht 5' 10\" (1.778 m)   Wt 116.2 kg (256 lb 2.8 oz)   SpO2 98%   BMI 36.76 kg/m²    O2 Flow Rate (L/min): 60 l/min O2 Device: Endotracheal tube, Ventilator Temp (24hrs), Av °F (36.7 °C), Min:96.9 °F (36.1 °C), Max:98.5 °F (36.9 °C)     Ventilator Settings:  Mode Rate Tidal Volume Pressure FiO2 PEEP   Assist control, Pressure control   400 ml    100 % 16 cm H20     Peak airway pressure: 46 cm H2O    Minute ventilation: 19.3 l/min        Intake/Output:     Intake/Output Summary (Last 24 hours) at 2021 0702  Last data filed at 2021 0630  Gross per 24 hour   Intake 4252.7 ml   Output 3162 ml   Net 1090.7 ml Physical Exam:  General:   Sedated intubated on mechanical ventilation   Eyes:  Sclera anicteric. Pupils equally round and reactive to light. Mouth/Throat: Mucous membranes normal, oral pharynx clear, ET tube, OG tube   Neck: Supple   Lungs:    Coarse crepitation bilaterally on auscultation   CV:  Regular rate and rhythm   Abdomen:   Soft, non-tender. bowel sounds normal. non-distended   Lines/Devices:  Intact, no erythema, drainage or tenderness   Psych:  Deeply sedated, withdraw to painful stimuli     24h Labs & Data: Reviewed    Medications: Reviewed    Chest X-Ray: pending for today    8/21 TTE: LVEF is 65 - 70%. Normal cavity size, systolic function (ejection fraction normal) and diastolic function. Mild concentric hypertrophy. Wall motion: normal. Normal left ventricular strain. Multidisciplinary Rounds Completed:  No    ABCDEF Bundle/Checklist Completed: Yes    SPECIAL EQUIPMENT: None    DISPOSITION: Stay in ICU    CRITICAL CARE CONSULTANT NOTE  I had a face to face encounter with the patient, reviewed and interpreted patient data including clinical events, labs, images, vital signs, I/O's, and examined patient. I have discussed the case and the plan and management of the patient's care with the consulting services, the bedside nurses and the respiratory therapist.      NOTE OF PERSONAL INVOLVEMENT IN CARE   This patient has a high probability of imminent, clinically significant deterioration, which requires the highest level of preparedness to intervene urgently. I participated in the decision-making and personally managed or directed the management of the following life and organ supporting interventions that required my frequent assessment to treat or prevent imminent deterioration. I personally spent 50 minutes of critical care time.  This is time spent at this critically ill patient's bedside actively involved in patient care as well as the coordination of care and discussions with the patient's family. This does not include any procedural time which has been billed separately.     Kristie Jonas MD  Staff Intensivist/Anesthesiologist  [facility]  8/30/2021

## 2021-08-31 NOTE — PROGRESS NOTES
[facility]    ICU TEAM Progress Note    Name: Godfrey Lin   : 1975   MRN: [id number]   Date: 2021      Assessment:   Reason for ICU Admission: Acute hypoxic respiratory failure secondary to COVID-19 Pneumonia     Brief HPI: 50 yo male who presented to the ED on  with no prior PMH with SOB x2 days. [facility] had developed body aches and fever as of .  He was diagnosed with COVID-19 pneumonia.  He was not vaccinated. He was started on dexamethasone, remdesivir -, and tocilizumab . He was admitted to the ICU on 8/15 for worsening hypoxia and required BiPAP.  He remained in the ICU from 8/15- on BiPAP.  He weaned from 100% to 80%  and was able to get himself from bed to chair. He was transferred to the [facility] on . FIO2 further decreased to 70% on . Hi Flow was attempted , but he quickly desaturated to 72%.  He was placed back on BiPAP at 80% FIO2 (IPAP/EPAP not listed).  CXR on  noted to have increased infiltrates. [facility] was able to take a break from BiPAP on  to eat, transitioning from HiFlow, but again became hypoxic, needing to go back to BiPAP. Plan:     Neuro: versed, fentanyl, precedex, propofol, nimbex  Pulm:   - Mechanical ventilation, goal is for protective ventilatory strategy.  Tidal volume of 6 to 8mL/kg of ideal body weight [450cc goal]. Continue PSV  - ABGs improving.   - Shalini off  - Continue methylpred taper  - Diuresis as needed  - DVT scan NEG & has received Toci  - CXR tomorrow  Cardiac: Remains HDS  Renal: Hyperkalemia - resolved. Creatinine plateaued, good urine output.   GI: Nepro TFs given prior hyperkalemia. Senna, colace. Triglycerides better as of 3/31  ID: Antibiotics off. Procal nL.    Heme: Lovenox  Endo: Cont methylpred taper  MSK:  Nimbex - wean today    F - Feeding:  Yes   A - Analgesia: Fentanyl  S - Sedation: Propofol, Precedex and Versed  T - DVT Prophylaxis: SCD's or Sequential Compression Device and Lovenox   C - Code Status: Full Code  H - Head of Bed: > 30 Degrees  U - Ulcer Prophylaxis: Pepcid (famotidine)   G - Glycemic Control: Insulin  S - Spontaneous Breathing Trial: No  B - Bowel Regimen: Senna  I - Indwelling Catheter:   Tubes: ETT and Orogastric Tube  Lines: Peripheral IV, Arterial Line and Central Line  Drains: Brownlee Catheter  D - De-escalation of Antibiotics: cefepime    Subjective:   Overnight Events:   2021 - Shalini weaned off. HDS.   : Improvement in oxygenation. Shalini wean ongoing.   : hypoxia and worsened hypercapnia overnight. Placed on bilevel. : Hyperkalemia marginally better with lokema. Liver enzymes improving.   : Patient condition worsened when he was placed in prone position. Led to significant hypoxia. Improved with positioning him back in supine position and adjusting mechanical ventilation. Remained heavily sedated and paralyzed  : Remains tenuous, no improvement in oxygen requirement or ventilatory support.  Requiring higher doses for sedation  : Intubated early morning on the .  Sedated. Objective:     Visit Vitals  BP (!) 140/90   Pulse 69   Temp 98.4 °F (36.9 °C)   Resp 30   Ht 5' 10\" (1.778 m)   Wt 117.4 kg (258 lb 13.1 oz)   SpO2 96%   BMI 37.14 kg/m²    O2 Flow Rate (L/min): 60 l/min O2 Device: Endotracheal tube, Ventilator Temp (24hrs), Av.3 °F (36.8 °C), Min:97.8 °F (36.6 °C), Max:99.2 °F (37.3 °C)     Ventilator Settings:  Mode Rate Tidal Volume Pressure FiO2 PEEP   Assist control, Pressure control   400 ml    100 % 16 cm H20     Peak airway pressure: 45 cm H2O    Minute ventilation: 17.7 l/min        Intake/Output:     Intake/Output Summary (Last 24 hours) at 2021 0658  Last data filed at 2021 0500  Gross per 24 hour   Intake 3360.2 ml   Output 3240 ml   Net 120.2 ml     Physical Exam:  General:   Sedated intubated on mechanical ventilation   Eyes:  Sclera anicteric. Pupils equally round and reactive to light.    Mouth/Throat: Mucous membranes normal, oral pharynx clear, ET tube, OG tube   Neck: Supple   Lungs:    Coarse crepitation bilaterally on auscultation   CV:  Regular rate and rhythm   Abdomen:   Soft, non-tender. bowel sounds normal. non-distended   Lines/Devices:  Intact, no erythema, drainage or tenderness   Psych:  Deeply sedated, withdraw to painful stimuli     24h Labs & Data: Reviewed    Medications: Reviewed    8/21 TTE: LVEF is 65 - 70%. Normal cavity size, systolic function (ejection fraction normal) and diastolic function. Mild concentric hypertrophy. Wall motion: normal. Normal left ventricular strain. Multidisciplinary Rounds Completed:  No    ABCDEF Bundle/Checklist Completed: Yes    SPECIAL EQUIPMENT: None    DISPOSITION: Stay in ICU    CRITICAL CARE CONSULTANT NOTE  I had a face to face encounter with the patient, reviewed and interpreted patient data including clinical events, labs, images, vital signs, I/O's, and examined patient. I have discussed the case and the plan and management of the patient's care with the consulting services, the bedside nurses and the respiratory therapist.      NOTE OF PERSONAL INVOLVEMENT IN CARE   This patient has a high probability of imminent, clinically significant deterioration, which requires the highest level of preparedness to intervene urgently. I participated in the decision-making and personally managed or directed the management of the following life and organ supporting interventions that required my frequent assessment to treat or prevent imminent deterioration. I personally spent 40 minutes of critical care time. This is time spent at this critically ill patient's bedside actively involved in patient care as well as the coordination of care and discussions with the patient's family. This does not include any procedural time which has been billed separately.     Lupe Valencia MD  Staff Intensivist/Anesthesiologist  [facility]  8/31/2021

## 2021-08-31 NOTE — PROGRESS NOTES
[facility]   [address] [facility], [address] [place] [address], [facility]  Phone: [phone]   [phone]       Nephrology Progress Note  Sami Cespedes     1975     [id number]  Date of Admission : 8/14/2021 08/31/21    CC: Follow up for NABEEL    Assessment and Plan   NABEEL :  - presumed to be 2/2 COVID illness.  - Cr stable and has good UOP. - continue chlorthalidone and may stop tomorrow   - avoid hypotension and all potential nephrotoxins   - labs daily     Hyperkalemia :  - resolved     COVID 19 PNA   Acute resp failure: on vent (8/25)  - On Shalini   - on Cefepime   - received Tocilizumab and remdesivir   - per CCM          Interval History:  Seen from outside the room through glass dors. Discussed w/RN   Off Shalini. Paralyzed and oxygenating well. Cr stable. 3+L UOP    Review of Systems: Review of systems not obtained due to patient factors.     Current Medications:   Current Facility-Administered Medications   Medication Dose Route Frequency    methylPREDNISolone (PF) (SOLU-MEDROL) injection 20 mg  20 mg IntraVENous Q6H    insulin NPH (NOVOLIN N, HUMULIN N) injection 5 Units  5 Units SubCUTAneous Q12H    insulin lispro (HUMALOG) injection   SubCUTAneous Q6H    hydrALAZINE (APRESOLINE) 20 mg/mL injection 20 mg  20 mg IntraVENous Q6H PRN    famotidine (PF) (PEPCID) 20 mg in 0.9% sodium chloride 10 mL injection  20 mg IntraVENous Q24H    chlorthalidone (HYGROTON) tablet 25 mg  25 mg Oral DAILY    dextrose (D50W) injection syrg 12.5-25 g  12.5-25 g IntraVENous PRN    white petrolatum-mineral oiL (AKWA TEARS) 83-15 % ophthalmic ointment   Both Eyes Q12H    midazolam in normal saline (VERSED) 1 mg/mL infusion  0-20 mg/hr IntraVENous TITRATE    balsam peru-castor oiL (VENELEX) ointment   Topical PRN    cisatracurium (NIMBEX) 2 mg/mL IV infusion (UNDILUTED)  0-10 mcg/kg/min IntraVENous TITRATE    propofol (DIPRIVAN) 10 mg/mL infusion  0-50 mcg/kg/min IntraVENous TITRATE    fentaNYL (PF) 1,500 mcg/30 mL (50 mcg/mL) infusion  0-300 mcg/hr IntraVENous TITRATE    chlorhexidine (ORAL CARE KIT) 0.12 % mouthwash 15 mL  15 mL Oral Q12H    docusate (COLACE) 50 mg/5 mL oral liquid 100 mg  100 mg Per NG tube DAILY    dexmedeTOMidine in 0.9 % NaCl (PRECEDEX) 400 mcg/100 mL (4 mcg/mL) infusion soln  0.1-1.5 mcg/kg/hr IntraVENous TITRATE    glucose chewable tablet 16 g  4 Tablet Oral PRN    dextrose (D50W) injection syrg 12.5-25 g  12.5-25 g IntraVENous PRN    glucagon (GLUCAGEN) injection 1 mg  1 mg IntraMUSCular PRN    melatonin tablet 3 mg  3 mg Oral QHS PRN    senna (SENOKOT) tablet 17.2 mg  2 Tablet Oral DAILY    sodium chloride (NS) flush 5-40 mL  5-40 mL IntraVENous Q8H    sodium chloride (NS) flush 5-40 mL  5-40 mL IntraVENous PRN    acetaminophen (TYLENOL) tablet 650 mg  650 mg Oral Q6H PRN    polyethylene glycol (MIRALAX) packet 17 g  17 g Oral DAILY PRN    ondansetron (ZOFRAN) injection 4 mg  4 mg IntraVENous Q6H PRN    enoxaparin (LOVENOX) injection 40 mg  40 mg SubCUTAneous Q12H      No Known Allergies    Objective:  Vitals:    Vitals:    08/31/21 0500 08/31/21 0600 08/31/21 0700 08/31/21 0711   BP: (!) 146/88 (!) 140/90 (!) 146/97    Pulse: 68 69 (!) 57 77   Resp: 30 30 30 30   Temp:       SpO2: 98% 96% 98% 95%   Weight:       Height:         Intake and Output:  No intake/output data recorded. 08/29 1901 - 08/31 0700  In: 5744.3 [I.V.:3934.3]  Out: 5065 [Urine:5065]    Physical Examination:not examined on room   Pt intubated    Yes   General: Sedated on vent   Neck:  Supple, no mass  Resp:  Symmetrical chest movements  CV:  RRR on monitor   GI:  Non distended   Neurologic:  sedated  :  Brownlee     []    High complexity decision making was performed  []    Patient is at high-risk of decompensation with multiple organ involvement    Lab Data Personally Reviewed: I have reviewed all the pertinent labs, microbiology data and radiology studies during assessment.     Recent Labs 08/31/21  0408 08/30/21 0311 08/29/21  1149 08/29/21  0623 08/28/21  2319     142 139 141 143 140   K 4.2  4.1 4.4 5.1 4.5 4.5   *  111* 111* 108 110* 109*   CO2 22  25 23 27 29 27   *  189* 219* 231* 208* 236*   BUN 92*  89* 85* 82* 73* 65*   CREA 2.10*  2.16* 2.41* 2.56* 2.41* 2.52*   CA 9.1  8.4* 8.6 8.5 8.3* 8.2*   MG 2.8* 2.9*  --  3.1*  --    PHOS 3.2  3.2 3.8 8.4* 5.1* 3.9   ALB 2.5*  2.4* 2.3* 2.6* 2.4* 2.5*   ALT 75 83*  --  101*  --      Recent Labs     08/31/21 0408 08/30/21 0311 08/29/21 0623   WBC 15.4* 14.3* 16.0*   HGB 12.8 12.9 13.2   HCT 40.1 39.5 41.8   * 134* 144*     No results found for: SDES  Lab Results   Component Value Date/Time    Culture result: NO GROWTH 2 DAYS 08/29/2021 02:37 PM    Culture result: MRSA NOT PRESENT 08/29/2021 02:37 PM    Culture result:  08/29/2021 02:37 PM     Screening of patient nares for MRSA is for surveillance purposes and, if positive, to facilitate isolation considerations in high risk settings. It is not intended for automatic decolonization interventions per se as regimens are not sufficiently effective to warrant routine use.     Culture result: HEAVY NORMAL RESPIRATORY JESSICA 08/23/2021 07:06 AM    Culture result: NO GROWTH 5 DAYS 08/22/2021 08:51 PM     Recent Results (from the past 24 hour(s))   GLUCOSE, POC    Collection Time: 08/30/21 11:55 AM   Result Value Ref Range    Glucose (POC) 224 (H) 65 - 117 mg/dL    Performed by Clius 145, POC    Collection Time: 08/30/21  5:31 PM   Result Value Ref Range    Glucose (POC) 168 (H) 65 - 117 mg/dL    Performed by Fiona Fraga, POC    Collection Time: 08/30/21 11:59 PM   Result Value Ref Range    Glucose (POC) 179 (H) 65 - 117 mg/dL    Performed by Latrice Daily    MAGNESIUM    Collection Time: 08/31/21  4:08 AM   Result Value Ref Range    Magnesium 2.8 (H) 1.6 - 2.4 mg/dL   PHOSPHORUS    Collection Time: 08/31/21  4:08 AM   Result Value Ref Range Phosphorus 3.2 2.6 - 4.7 MG/DL   PROCALCITONIN    Collection Time: 08/31/21  4:08 AM   Result Value Ref Range    Procalcitonin 0.11 ng/mL   LACTIC ACID    Collection Time: 08/31/21  4:08 AM   Result Value Ref Range    Lactic acid 2.7 (HH) 0.4 - 2.0 MMOL/L   CBC W/O DIFF    Collection Time: 08/31/21  4:08 AM   Result Value Ref Range    WBC 15.4 (H) 4.1 - 11.1 K/uL    RBC 4.39 4. 10 - 5.70 M/uL    HGB 12.8 12.1 - 17.0 g/dL    HCT 40.1 36.6 - 50.3 %    MCV 91.3 80.0 - 99.0 FL    MCH 29.2 26.0 - 34.0 PG    MCHC 31.9 30.0 - 36.5 g/dL    RDW 14.2 11.5 - 14.5 %    PLATELET 826 (L) 241 - 400 K/uL    MPV 12.4 8.9 - 12.9 FL    NRBC 0.0 0  WBC    ABSOLUTE NRBC 0.00 0.00 - 4.11 K/uL   METABOLIC PANEL, COMPREHENSIVE    Collection Time: 08/31/21  4:08 AM   Result Value Ref Range    Sodium 142 136 - 145 mmol/L    Potassium 4.1 3.5 - 5.1 mmol/L    Chloride 111 (H) 97 - 108 mmol/L    CO2 25 21 - 32 mmol/L    Anion gap 6 5 - 15 mmol/L    Glucose 189 (H) 65 - 100 mg/dL    BUN 89 (H) 6 - 20 MG/DL    Creatinine 2.16 (H) 0.70 - 1.30 MG/DL    BUN/Creatinine ratio 41 (H) 12 - 20      GFR est AA 40 (L) >60 ml/min/1.73m2    GFR est non-AA 33 (L) >60 ml/min/1.73m2    Calcium 8.4 (L) 8.5 - 10.1 MG/DL    Bilirubin, total 0.5 0.2 - 1.0 MG/DL    ALT (SGPT) 75 12 - 78 U/L    AST (SGOT) 23 15 - 37 U/L    Alk.  phosphatase 70 45 - 117 U/L    Protein, total 5.7 (L) 6.4 - 8.2 g/dL    Albumin 2.4 (L) 3.5 - 5.0 g/dL    Globulin 3.3 2.0 - 4.0 g/dL    A-G Ratio 0.7 (L) 1.1 - 2.2     RENAL FUNCTION PANEL    Collection Time: 08/31/21  4:08 AM   Result Value Ref Range    Sodium 143 136 - 145 mmol/L    Potassium 4.2 3.5 - 5.1 mmol/L    Chloride 111 (H) 97 - 108 mmol/L    CO2 22 21 - 32 mmol/L    Anion gap 10 5 - 15 mmol/L    Glucose 203 (H) 65 - 100 mg/dL    BUN 92 (H) 6 - 20 MG/DL    Creatinine 2.10 (H) 0.70 - 1.30 MG/DL    BUN/Creatinine ratio 44 (H) 12 - 20      GFR est AA 41 (L) >60 ml/min/1.73m2    GFR est non-AA 34 (L) >60 ml/min/1.73m2    Calcium 9.1 8.5 - 10.1 MG/DL    Phosphorus 3.2 2.6 - 4.7 MG/DL    Albumin 2.5 (L) 3.5 - 5.0 g/dL   GLUCOSE, POC    Collection Time: 08/31/21  5:29 AM   Result Value Ref Range    Glucose (POC) 202 (H) 65 - 117 mg/dL    Performed by Lewis Squires    CARBOXYHEMOGLOBIN    Collection Time: 08/31/21  5:53 AM   Result Value Ref Range    Carboxy-Hgb 0.3 (L) 1 - 2 %    Methemoglobin 0.1 0 - 1.4 %    tHb 13.8 (L) 14 - 17 g/dL    Oxyhemoglobin 97.3 (H) 94 - 97 %    O2 SATURATION 98 95 - 99 %    SITE RIGHT RADIAL      Sample source ARTERIAL     BLOOD GAS, ARTERIAL    Collection Time: 08/31/21  5:56 AM   Result Value Ref Range    pH 7.44 7.35 - 7.45      PCO2 33 (L) 35 - 45 mmHg    PO2 103 (H) 80 - 100 mmHg    O2 SAT 98 (H) 92 - 97 %    BICARBONATE 22 22 - 26 mmol/L    BASE DEFICIT 1.5 mmol/L    O2 METHOD VENT      FIO2 100 %    MODE ASSIST CONTROL      SET RATE 30      PRESSURE SUPPORT 28      PEEP/CPAP 16.0      Sample source ARTERIAL      SITE RIGHT RADIAL      RANDALL'S TEST YES             Total time spent with patient:  xxx   min. Care Plan discussed with:  Patient     Family      RN      Consulting Physician [facility],         I have reviewed the flowsheets. Chart and Pertinent Notes have been reviewed. No change in PMH ,family and social history from Consult note.       Birana Castillo MD

## 2021-09-01 NOTE — PROGRESS NOTES
[facility]   [address] [facility], [address] [place] [address], [facility]  Phone: [phone]   [phone]       Nephrology Progress Note  Bennett Temple     1975     [id number]  Date of Admission : 8/14/2021 09/01/21    CC: Follow up for NABEEL    Assessment and Plan   NABEEL :  - presumed to be 2/2 COVID illness.  - Cr stable and has good UOP. - continue chlorthalidone   - avoid hypotension and all potential nephrotoxins   - labs daily     Hyperkalemia :  - resolved and K  stable     COVID 19 PNA   Acute resp failure: on vent (8/25)  - Off Elsa   - on Cefepime, steroids    - received Tocilizumab and remdesivir   - per CCM          Interval History:  Seen from outside the room through glass dors. Stable from renal stand point. Good UOP. Stable off Elsa  WBC increased   Afebrile     Review of Systems: Review of systems not obtained due to patient factors.     Current Medications:   Current Facility-Administered Medications   Medication Dose Route Frequency    methylPREDNISolone (PF) (SOLU-MEDROL) injection 20 mg  20 mg IntraVENous Q6H    insulin NPH (NOVOLIN N, HUMULIN N) injection 5 Units  5 Units SubCUTAneous Q12H    insulin lispro (HUMALOG) injection   SubCUTAneous Q6H    hydrALAZINE (APRESOLINE) 20 mg/mL injection 20 mg  20 mg IntraVENous Q6H PRN    famotidine (PF) (PEPCID) 20 mg in 0.9% sodium chloride 10 mL injection  20 mg IntraVENous Q24H    chlorthalidone (HYGROTON) tablet 25 mg  25 mg Oral DAILY    dextrose (D50W) injection syrg 12.5-25 g  12.5-25 g IntraVENous PRN    white petrolatum-mineral oiL (AKWA TEARS) 83-15 % ophthalmic ointment   Both Eyes Q12H    midazolam in normal saline (VERSED) 1 mg/mL infusion  0-20 mg/hr IntraVENous TITRATE    balsam peru-castor oiL (VENELEX) ointment   Topical PRN    cisatracurium (NIMBEX) 2 mg/mL IV infusion (UNDILUTED)  0-10 mcg/kg/min IntraVENous TITRATE    propofol (DIPRIVAN) 10 mg/mL infusion  0-50 mcg/kg/min IntraVENous TITRATE    fentaNYL (PF) 1,500 mcg/30 mL (50 mcg/mL) infusion  0-300 mcg/hr IntraVENous TITRATE    chlorhexidine (ORAL CARE KIT) 0.12 % mouthwash 15 mL  15 mL Oral Q12H    docusate (COLACE) 50 mg/5 mL oral liquid 100 mg  100 mg Per NG tube DAILY    dexmedeTOMidine in 0.9 % NaCl (PRECEDEX) 400 mcg/100 mL (4 mcg/mL) infusion soln  0.1-1.5 mcg/kg/hr IntraVENous TITRATE    glucose chewable tablet 16 g  4 Tablet Oral PRN    dextrose (D50W) injection syrg 12.5-25 g  12.5-25 g IntraVENous PRN    glucagon (GLUCAGEN) injection 1 mg  1 mg IntraMUSCular PRN    melatonin tablet 3 mg  3 mg Oral QHS PRN    senna (SENOKOT) tablet 17.2 mg  2 Tablet Oral DAILY    sodium chloride (NS) flush 5-40 mL  5-40 mL IntraVENous Q8H    sodium chloride (NS) flush 5-40 mL  5-40 mL IntraVENous PRN    acetaminophen (TYLENOL) tablet 650 mg  650 mg Oral Q6H PRN    polyethylene glycol (MIRALAX) packet 17 g  17 g Oral DAILY PRN    ondansetron (ZOFRAN) injection 4 mg  4 mg IntraVENous Q6H PRN    enoxaparin (LOVENOX) injection 40 mg  40 mg SubCUTAneous Q12H      No Known Allergies    Objective:  Vitals:    Vitals:    09/01/21 0400 09/01/21 0406 09/01/21 0500 09/01/21 0600   BP: 138/81  139/86 108/61   Pulse: 66 74 60 73   Resp: 30 30 30 30   Temp: 97.6 °F (36.4 °C)      SpO2: 97% 95% 97% 95%   Weight:       Height:         Intake and Output:  No intake/output data recorded. 08/30 1901 - 09/01 0700  In: 5040.7 [I.V.:3710.7]  Out: 5765 [Urine:5765]    Physical Examination:not examined on room   Pt intubated    Yes   General: Sedated on vent   Neck:  Supple, no mass  Resp:  Symmetrical chest movements  CV:  RRR on monitor   GI:  Non distended   Neurologic:  sedated  :  Brownlee     []    High complexity decision making was performed  []    Patient is at high-risk of decompensation with multiple organ involvement    Lab Data Personally Reviewed: I have reviewed all the pertinent labs, microbiology data and radiology studies during assessment.     Recent Labs     09/01/21 0318 08/31/21  0408 08/30/21 0311 08/29/21  1149     142 143  142 139 141   K 4.5  4.4 4.2  4.1 4.4 5.1   *  111* 111*  111* 111* 108   CO2 23  25 22  25 23 27   *  198* 203*  189* 219* 231*   BUN 85*  88* 92*  89* 85* 82*   CREA 2.01*  2.03* 2.10*  2.16* 2.41* 2.56*   CA 8.4*  8.8 9.1  8.4* 8.6 8.5   MG 2.5* 2.8* 2.9*  --    PHOS 4.0  3.6 3.2  3.2 3.8 8.4*   ALB 2.3*  2.4* 2.5*  2.4* 2.3* 2.6*   ALT 77 75 83*  --      Recent Labs     09/01/21 0318 08/31/21 0408 08/30/21 0311   WBC 17.2* 15.4* 14.3*   HGB 12.1 12.8 12.9   HCT 37.1 40.1 39.5   * 139* 134*     No results found for: SDES  Lab Results   Component Value Date/Time    Culture result: NO GROWTH 3 DAYS 08/29/2021 02:37 PM    Culture result: MRSA NOT PRESENT 08/29/2021 02:37 PM    Culture result:  08/29/2021 02:37 PM     Screening of patient nares for MRSA is for surveillance purposes and, if positive, to facilitate isolation considerations in high risk settings. It is not intended for automatic decolonization interventions per se as regimens are not sufficiently effective to warrant routine use.     Culture result: HEAVY NORMAL RESPIRATORY JESSICA 08/23/2021 07:06 AM    Culture result: NO GROWTH 5 DAYS 08/22/2021 08:51 PM     Recent Results (from the past 24 hour(s))   GLUCOSE, POC    Collection Time: 08/31/21 12:30 PM   Result Value Ref Range    Glucose (POC) 209 (H) 65 - 117 mg/dL    Performed by Clius 145, POC    Collection Time: 08/31/21  6:29 PM   Result Value Ref Range    Glucose (POC) 178 (H) 65 - 117 mg/dL    Performed by Clius 145, POC    Collection Time: 09/01/21 12:08 AM   Result Value Ref Range    Glucose (POC) 191 (H) 65 - 117 mg/dL    Performed by Lewis Squires    MAGNESIUM    Collection Time: 09/01/21  3:18 AM   Result Value Ref Range    Magnesium 2.5 (H) 1.6 - 2.4 mg/dL   PHOSPHORUS    Collection Time: 09/01/21  3:18 AM   Result Value Ref Range Phosphorus 4.0 2.6 - 4.7 MG/DL   LACTIC ACID    Collection Time: 09/01/21  3:18 AM   Result Value Ref Range    Lactic acid 2.5 (HH) 0.4 - 2.0 MMOL/L   RENAL FUNCTION PANEL    Collection Time: 09/01/21  3:18 AM   Result Value Ref Range    Sodium 142 136 - 145 mmol/L    Potassium 4.5 3.5 - 5.1 mmol/L    Chloride 111 (H) 97 - 108 mmol/L    CO2 23 21 - 32 mmol/L    Anion gap 8 5 - 15 mmol/L    Glucose 195 (H) 65 - 100 mg/dL    BUN 85 (H) 6 - 20 MG/DL    Creatinine 2.01 (H) 0.70 - 1.30 MG/DL    BUN/Creatinine ratio 42 (H) 12 - 20      GFR est AA 43 (L) >60 ml/min/1.73m2    GFR est non-AA 36 (L) >60 ml/min/1.73m2    Calcium 8.4 (L) 8.5 - 10.1 MG/DL    Phosphorus 3.6 2.6 - 4.7 MG/DL    Albumin 2.3 (L) 3.5 - 5.0 g/dL   CBC W/O DIFF    Collection Time: 09/01/21  3:18 AM   Result Value Ref Range    WBC 17.2 (H) 4.1 - 11.1 K/uL    RBC 4.07 (L) 4.10 - 5.70 M/uL    HGB 12.1 12.1 - 17.0 g/dL    HCT 37.1 36.6 - 50.3 %    MCV 91.2 80.0 - 99.0 FL    MCH 29.7 26.0 - 34.0 PG    MCHC 32.6 30.0 - 36.5 g/dL    RDW 14.5 11.5 - 14.5 %    PLATELET 997 (L) 150 - 400 K/uL    MPV 11.6 8.9 - 12.9 FL    NRBC 0.0 0  WBC    ABSOLUTE NRBC 0.00 0.00 - 3.75 K/uL   METABOLIC PANEL, COMPREHENSIVE    Collection Time: 09/01/21  3:18 AM   Result Value Ref Range    Sodium 142 136 - 145 mmol/L    Potassium 4.4 3.5 - 5.1 mmol/L    Chloride 111 (H) 97 - 108 mmol/L    CO2 25 21 - 32 mmol/L    Anion gap 6 5 - 15 mmol/L    Glucose 198 (H) 65 - 100 mg/dL    BUN 88 (H) 6 - 20 MG/DL    Creatinine 2.03 (H) 0.70 - 1.30 MG/DL    BUN/Creatinine ratio 43 (H) 12 - 20      GFR est AA 43 (L) >60 ml/min/1.73m2    GFR est non-AA 35 (L) >60 ml/min/1.73m2    Calcium 8.8 8.5 - 10.1 MG/DL    Bilirubin, total 0.5 0.2 - 1.0 MG/DL    ALT (SGPT) 77 12 - 78 U/L    AST (SGOT) 28 15 - 37 U/L    Alk.  phosphatase 80 45 - 117 U/L    Protein, total 5.6 (L) 6.4 - 8.2 g/dL    Albumin 2.4 (L) 3.5 - 5.0 g/dL    Globulin 3.2 2.0 - 4.0 g/dL    A-G Ratio 0.8 (L) 1.1 - 2.2     POC EG7 Collection Time: 09/01/21  4:12 AM   Result Value Ref Range    Calcium, ionized (POC) 1.28 1.12 - 1.32 mmol/L    FIO2 (POC) 100 %    pH (POC) 7.37 7.35 - 7.45      pCO2 (POC) 42.4 35.0 - 45.0 MMHG    pO2 (POC) 108 (H) 80 - 100 MMHG    HCO3 (POC) 24.4 22 - 26 MMOL/L    Base deficit (POC) 1.0 mmol/L    sO2 (POC) 98.0 (H) 92 - 97 %    Site RIGHT RADIAL      Device: ADULT VENT      Mode PRESSURE CONTROL      Set Rate 30 bpm    PEEP/CPAP (POC) 16 cmH2O    PIP (POC) 28      Allens test (POC) Positive      Inspiratory Time 1.33 sec    Specimen type (POC) ARTERIAL     GLUCOSE, POC    Collection Time: 09/01/21  5:44 AM   Result Value Ref Range    Glucose (POC) 171 (H) 65 - 117 mg/dL    Performed by Loni Cooney            Total time spent with patient:  xxx   min. Care Plan discussed with:  Patient     Family      RN      Consulting Physician [address] [place] [address]        I have reviewed the flowsheets. Chart and Pertinent Notes have been reviewed. No change in PMH ,family and social history from Consult note.       Viridiana Cullen MD

## 2021-09-01 NOTE — PROGRESS NOTES
0730: Bedside and Verbal shift change report given to Tamie Horner Barney and Lisa Lee RN (oncoming nurse) by Marlin Gómez (offgoing nurse). Report included the following information SBAR, Kardex, Intake/Output, MAR, Recent Results, Cardiac Rhythm NSR and Alarm Parameters . 1930: Bedside and Verbal shift change report given to Marlin Gómez (oncoming nurse) by Wallace Neri RN (offgoing nurse). Report included the following information SBAR, Kardex, Intake/Output, MAR, Recent Results, Cardiac Rhythm NSR and Alarm Parameters .

## 2021-09-01 NOTE — PROGRESS NOTES
Transition of Care Plan   RUR- Medium    DISPOSITION:pending medical progression   F/U with PCP/Specialist     Transport: TBD  Patient remains in the ICU on isolation for COVID 19. Remains intubated on Precedex, Fentanyl, Versed and Diprivan gtts. Care management is continuing to follow for transitions of care.  Kaden Zhou RN,Care Management

## 2021-09-01 NOTE — PROGRESS NOTES
1930: Bedside and Verbal shift change report given to [address] (oncoming nurse) by Liam Coppola RN (offgoing nurse). Report included the following information SBAR, Kardex, ED Summary, MAR, Recent Results, and Cardiac Rhythm NSR .

## 2021-09-01 NOTE — PROGRESS NOTES
Renal Dosing/Monitoring  Medication: Famotidine   Current regimen:  20 mg IV every 24 hr  Recent Labs     09/01/21  0318 08/31/21  0408 08/30/21  0311   CREA 2.01*  2.03* 2.10*  2.16* 2.41*   BUN 85*  88* 92*  89* 85*     Estimated CrCl:  ~55-60 ml/min  Plan: Change to 20 mg IV every 12 hours per [facility] P&T Committee Protocol with respect to renal function. Pharmacy will continue to monitor patient daily and will make dosage adjustments based upon changing renal function.

## 2021-09-01 NOTE — PROGRESS NOTES
[facility]    ICU TEAM Progress Note    Name: Dottie Herndon   : 1975   MRN: [id number]   Date: 2021      Assessment:   Reason for ICU Admission: Acute hypoxic respiratory failure secondary to COVID-19 Pneumonia     Brief HPI: 50 yo male who presented to the ED on  with no prior PMH with SOB x2 days. [facility] had developed body aches and fever as of .  He was diagnosed with COVID-19 pneumonia.  He was not vaccinated. He was started on dexamethasone, remdesivir -, and tocilizumab . He was admitted to the ICU on 8/15 for worsening hypoxia and required BiPAP.  He remained in the ICU from 8/15- on BiPAP.  He weaned from 100% to 80%  and was able to get himself from bed to chair. He was transferred to the [facility] on . FIO2 further decreased to 70% on . Hi Flow was attempted , but he quickly desaturated to 72%.  He was placed back on BiPAP at 80% FIO2 (IPAP/EPAP not listed).  CXR on  noted to have increased infiltrates. [facility] was able to take a break from BiPAP on  to eat, transitioning from HiFlow, but again became hypoxic, needing to go back to BiPAP. Plan:     Neuro: versed, fentanyl, precedex, propofol; add oxy/valium enterally  Pulm:   - Mechanical ventilation, goal is for protective ventilatory strategy.  Tidal volume of 6 to 8mL/kg of ideal body weight [450cc goal]. - ABGs improving.   - Shalini off  - Nimbex off  - Continue methylpred taper  - Diuresis as needed  - DVT scan NEG & has received Toci  - CXR tomorrow  Cardiac: Remains HDS  Renal: Hyperkalemia - resolved. Creatinine plateaued, good urine output. Appreciate Neph  GI: Nepro TFs given prior hyperkalemia. Senna, colace. Triglycerides better as of 3/31  ID: Antibiotics off. Procal nL.    Heme: Lovenox  Endo: Cont methylpred taper  MSK:  Nimbex - wean today    F - Feeding:  Yes   A - Analgesia: Fentanyl  S - Sedation: Propofol, Precedex and Versed  T - DVT Prophylaxis: SCD's or Sequential Compression Device and Lovenox   C - Code Status: Full Code  H - Head of Bed: > 30 Degrees  U - Ulcer Prophylaxis: Pepcid (famotidine)   G - Glycemic Control: Insulin  S - Spontaneous Breathing Trial: No  B - Bowel Regimen: Senna  I - Indwelling Catheter:   Tubes: ETT and Orogastric Tube  Lines: Peripheral IV and Central Line  Drains: Brownlee Catheter  D - De-escalation of Antibiotics: cefepime    Subjective:   Overnight Events:   : off paralytic  : Shalini weaned off. HDS.   : Improvement in oxygenation. Shalini wean ongoing.   : hypoxia and worsened hypercapnia overnight. Placed on bilevel. : Hyperkalemia marginally better with lokema. Liver enzymes improving.   : Patient condition worsened when he was placed in prone position. Led to significant hypoxia. Improved with positioning him back in supine position and adjusting mechanical ventilation. Remained heavily sedated and paralyzed  : Remains tenuous, no improvement in oxygen requirement or ventilatory support.  Requiring higher doses for sedation  : Intubated early morning on the .  Sedated. Objective:     Visit Vitals  /76   Pulse 66   Temp 97.8 °F (36.6 °C)   Resp 30   Ht 5' 10\" (1.778 m)   Wt 116.9 kg (257 lb 11.5 oz)   SpO2 97%   BMI 36.98 kg/m²    O2 Flow Rate (L/min): 60 l/min O2 Device: Endotracheal tube, Ventilator Temp (24hrs), Av.2 °F (36.8 °C), Min:97.6 °F (36.4 °C), Max:99.1 °F (37.3 °C)     Ventilator Settings:  Mode Rate Tidal Volume Pressure FiO2 PEEP   Pressure control   400 ml    100 % 16 cm H20     Peak airway pressure: 45 cm H2O    Minute ventilation: 13 l/min        Intake/Output:     Intake/Output Summary (Last 24 hours) at 2021 1055  Last data filed at 2021 0900  Gross per 24 hour   Intake 2898.7 ml   Output 3735 ml   Net -836.3 ml     Physical Exam:  General:   Sedated intubated on mechanical ventilation   Eyes:  Sclera anicteric. Pupils equally round and reactive to light. Mouth/Throat: Mucous membranes normal, oral pharynx clear, ET tube, OG tube   Neck: Supple   Lungs:    Coarse crepitation bilaterally on auscultation   CV:  Regular rate and rhythm   Abdomen:   Soft, non-tender. bowel sounds normal. non-distended   Lines/Devices:  Intact, no erythema, drainage or tenderness   Psych:  Deeply sedated, withdraw to painful stimuli     24h Labs & Data: Reviewed    Medications: Reviewed    8/21 TTE: LVEF is 65 - 70%. Normal cavity size, systolic function (ejection fraction normal) and diastolic function. Mild concentric hypertrophy. Wall motion: normal. Normal left ventricular strain. Multidisciplinary Rounds Completed:  No    ABCDEF Bundle/Checklist Completed: Yes    SPECIAL EQUIPMENT: None    DISPOSITION: Stay in ICU    CRITICAL CARE CONSULTANT NOTE  I had a face to face encounter with the patient, reviewed and interpreted patient data including clinical events, labs, images, vital signs, I/O's, and examined patient. I have discussed the case and the plan and management of the patient's care with the consulting services, the bedside nurses and the respiratory therapist.      NOTE OF PERSONAL INVOLVEMENT IN CARE   This patient has a high probability of imminent, clinically significant deterioration, which requires the highest level of preparedness to intervene urgently. I participated in the decision-making and personally managed or directed the management of the following life and organ supporting interventions that required my frequent assessment to treat or prevent imminent deterioration. I personally spent 110 minutes of critical care time. This is time spent at this critically ill patient's bedside actively involved in patient care as well as the coordination of care and discussions with the patient's family. This does not include any procedural time which has been billed separately.     Mike Benson DO  Staff Intensivist/Anesthesiologist  [facility]  9/1/2021

## 2021-09-01 NOTE — PROGRESS NOTES
1930: Bedside and Verbal shift change report given to Jose Sadler (oncoming nurse) by Duke Lee RN (offgoing nurse). Report included the following information SBAR, Kardex, ED Summary, Intake/Output, MAR, Recent Results, and Cardiac Rhythm NSR .

## 2021-09-02 NOTE — PROGRESS NOTES
[facility]   [address] [facility], [address] [place] [address], [facility]  Phone: [phone]   [phone]       Nephrology Progress Note  Katherin Morrow     1975     [id number]  Date of Admission : 8/14/2021 09/02/21    CC: Follow up for NABEEL    Assessment and Plan   NABEEL :  - presumed to be 2/2 COVID illness.  - Cr stable and has good UOP. - cont present care  - daily labs    Hyperkalemia :  - resolved and K  stable     COVID 19 PNA   Acute resp failure: on vent (8/25)  - Off Shalini   - on Cefepime, steroids    - received Tocilizumab and remdesivir   - per CCM          Interval History:  Examined outside room. Stable Cr and UOP. Stable night per RN. Remains sedated on the vent    Review of Systems: Review of systems not obtained due to patient factors.     Current Medications:   Current Facility-Administered Medications   Medication Dose Route Frequency    oxyCODONE IR (ROXICODONE) tablet 15 mg  15 mg Oral Q8H    diazePAM (VALIUM) tablet 5 mg  5 mg Oral Q8H    famotidine (PF) (PEPCID) 20 mg in 0.9% sodium chloride 10 mL injection  20 mg IntraVENous Q12H    methylPREDNISolone (PF) (SOLU-MEDROL) injection 20 mg  20 mg IntraVENous Q6H    insulin NPH (NOVOLIN N, HUMULIN N) injection 5 Units  5 Units SubCUTAneous Q12H    insulin lispro (HUMALOG) injection   SubCUTAneous Q6H    hydrALAZINE (APRESOLINE) 20 mg/mL injection 20 mg  20 mg IntraVENous Q6H PRN    chlorthalidone (HYGROTON) tablet 25 mg  25 mg Oral DAILY    dextrose (D50W) injection syrg 12.5-25 g  12.5-25 g IntraVENous PRN    white petrolatum-mineral oiL (AKWA TEARS) 83-15 % ophthalmic ointment   Both Eyes Q12H    midazolam in normal saline (VERSED) 1 mg/mL infusion  0-20 mg/hr IntraVENous TITRATE    balsam peru-castor oiL (VENELEX) ointment   Topical PRN    propofol (DIPRIVAN) 10 mg/mL infusion  0-50 mcg/kg/min IntraVENous TITRATE    fentaNYL (PF) 1,500 mcg/30 mL (50 mcg/mL) infusion  0-300 mcg/hr IntraVENous TITRATE    chlorhexidine (ORAL CARE KIT) 0.12 % mouthwash 15 mL  15 mL Oral Q12H    docusate (COLACE) 50 mg/5 mL oral liquid 100 mg  100 mg Per NG tube DAILY    dexmedeTOMidine in 0.9 % NaCl (PRECEDEX) 400 mcg/100 mL (4 mcg/mL) infusion soln  0.1-1.5 mcg/kg/hr IntraVENous TITRATE    glucose chewable tablet 16 g  4 Tablet Oral PRN    dextrose (D50W) injection syrg 12.5-25 g  12.5-25 g IntraVENous PRN    glucagon (GLUCAGEN) injection 1 mg  1 mg IntraMUSCular PRN    senna (SENOKOT) tablet 17.2 mg  2 Tablet Oral DAILY    sodium chloride (NS) flush 5-40 mL  5-40 mL IntraVENous Q8H    sodium chloride (NS) flush 5-40 mL  5-40 mL IntraVENous PRN    acetaminophen (TYLENOL) tablet 650 mg  650 mg Oral Q6H PRN    polyethylene glycol (MIRALAX) packet 17 g  17 g Oral DAILY PRN    ondansetron (ZOFRAN) injection 4 mg  4 mg IntraVENous Q6H PRN    enoxaparin (LOVENOX) injection 40 mg  40 mg SubCUTAneous Q12H      No Known Allergies    Objective:  Vitals:    Vitals:    09/02/21 0400 09/02/21 0500 09/02/21 0600 09/02/21 0700   BP: 127/74 134/87 134/86 133/83   Pulse: 61 62 (!) 55 (!) 57   Resp: 30 30 30 30   Temp: 98 °F (36.7 °C)      SpO2: 97% 97% 97% 96%   Weight:       Height:         Intake and Output:  No intake/output data recorded. 08/31 1901 - 09/02 0700  In: 5014.2 [I.V.:3414.2]  Out: 2505 [Urine:6270; Drains:100]    Physical Examination:not examined on room   Pt intubated    Yes   General: Sedated on vent   Neck:  Supple, no mass  Resp:  Symmetrical chest movements  CV:  RRR on monitor   GI:  Non distended   Neurologic:  sedated  :  Brownlee     []    High complexity decision making was performed  []    Patient is at high-risk of decompensation with multiple organ involvement    Lab Data Personally Reviewed: I have reviewed all the pertinent labs, microbiology data and radiology studies during assessment.     Recent Labs     09/02/21  0453 09/01/21  0318 08/31/21  0408     144 142  142 143  142   K 5.0  5.0 4.5 4.4 4.2  4.1   *  112* 111*  111* 111*  111*   CO2 26  27 23  25 22  25   *  178* 195*  198* 203*  189*   BUN 80*  83* 85*  88* 92*  89*   CREA 1.91*  1.94* 2.01*  2.03* 2.10*  2.16*   CA 8.6  8.6 8.4*  8.8 9.1  8.4*   MG 2.5* 2.5* 2.8*   PHOS 4.0  3.9 4.0  3.6 3.2  3.2   ALB 2.3*  2.3* 2.3*  2.4* 2.5*  2.4*   ALT 74 77 75     Recent Labs     09/02/21  0453 09/01/21  0318 08/31/21  0408   WBC 16.2* 17.2* 15.4*   HGB 11.5* 12.1 12.8   HCT 35.9* 37.1 40.1   * 125* 139*     No results found for: [facility]  Lab Results   Component Value Date/Time    Culture result: NO GROWTH 3 DAYS 08/29/2021 02:37 PM    Culture result: MRSA NOT PRESENT 08/29/2021 02:37 PM    Culture result:  08/29/2021 02:37 PM     Screening of patient nares for MRSA is for surveillance purposes and, if positive, to facilitate isolation considerations in high risk settings. It is not intended for automatic decolonization interventions per se as regimens are not sufficiently effective to warrant routine use.     Culture result: HEAVY NORMAL RESPIRATORY JESSICA 08/23/2021 07:06 AM    Culture result: NO GROWTH 5 DAYS 08/22/2021 08:51 PM     Recent Results (from the past 24 hour(s))   GLUCOSE, POC    Collection Time: 09/01/21 11:57 AM   Result Value Ref Range    Glucose (POC) 180 (H) 65 - 117 mg/dL    Performed by Darryl Reveal, POC    Collection Time: 09/01/21  6:27 PM   Result Value Ref Range    Glucose (POC) 149 (H) 65 - 117 mg/dL    Performed by Darryl Reveal, POC    Collection Time: 09/01/21 11:45 PM   Result Value Ref Range    Glucose (POC) 148 (H) 65 - 117 mg/dL    Performed by [facility]    BLOOD GAS, ARTERIAL    Collection Time: 09/02/21  4:50 AM   Result Value Ref Range    pH 7.35 7.35 - 7.45      PCO2 49 (H) 35 - 45 mmHg    PO2 168 (H) 80 - 100 mmHg    O2 SAT 99 (H) 92 - 97 %    BICARBONATE 26 22 - 26 mmol/L    BASE DEFICIT 0.3 mmol/L    O2 METHOD VENT      FIO2 100 %    MODE ASSIST CONTROL      SET RATE 30      PEEP/CPAP 16.0      Sample source ARTERIAL      SITE RIGHT RADIAL      RANDALL'S TEST YES      TEMPERATURE 37.0     PROCALCITONIN    Collection Time: 09/02/21  4:53 AM   Result Value Ref Range    Procalcitonin 0.17 ng/mL   LACTIC ACID    Collection Time: 09/02/21  4:53 AM   Result Value Ref Range    Lactic acid 2.2 (HH) 0.4 - 2.0 MMOL/L   RENAL FUNCTION PANEL    Collection Time: 09/02/21  4:53 AM   Result Value Ref Range    Sodium 144 136 - 145 mmol/L    Potassium 5.0 3.5 - 5.1 mmol/L    Chloride 112 (H) 97 - 108 mmol/L    CO2 26 21 - 32 mmol/L    Anion gap 6 5 - 15 mmol/L    Glucose 177 (H) 65 - 100 mg/dL    BUN 80 (H) 6 - 20 MG/DL    Creatinine 1.91 (H) 0.70 - 1.30 MG/DL    BUN/Creatinine ratio 42 (H) 12 - 20      GFR est AA 46 (L) >60 ml/min/1.73m2    GFR est non-AA 38 (L) >60 ml/min/1.73m2    Calcium 8.6 8.5 - 10.1 MG/DL    Phosphorus 4.0 2.6 - 4.7 MG/DL    Albumin 2.3 (L) 3.5 - 5.0 g/dL   CBC W/O DIFF    Collection Time: 09/02/21  4:53 AM   Result Value Ref Range    WBC 16.2 (H) 4.1 - 11.1 K/uL    RBC 3.86 (L) 4.10 - 5.70 M/uL    HGB 11.5 (L) 12.1 - 17.0 g/dL    HCT 35.9 (L) 36.6 - 50.3 %    MCV 93.0 80.0 - 99.0 FL    MCH 29.8 26.0 - 34.0 PG    MCHC 32.0 30.0 - 36.5 g/dL    RDW 14.6 (H) 11.5 - 14.5 %    PLATELET 199 (L) 030 - 400 K/uL    MPV 11.9 8.9 - 12.9 FL    NRBC 0.0 0  WBC    ABSOLUTE NRBC 0.00 0.00 - 8.85 K/uL   METABOLIC PANEL, COMPREHENSIVE    Collection Time: 09/02/21  4:53 AM   Result Value Ref Range    Sodium 144 136 - 145 mmol/L    Potassium 5.0 3.5 - 5.1 mmol/L    Chloride 112 (H) 97 - 108 mmol/L    CO2 27 21 - 32 mmol/L    Anion gap 5 5 - 15 mmol/L    Glucose 178 (H) 65 - 100 mg/dL    BUN 83 (H) 6 - 20 MG/DL    Creatinine 1.94 (H) 0.70 - 1.30 MG/DL    BUN/Creatinine ratio 43 (H) 12 - 20      GFR est AA 45 (L) >60 ml/min/1.73m2    GFR est non-AA 37 (L) >60 ml/min/1.73m2    Calcium 8.6 8.5 - 10.1 MG/DL    Bilirubin, total 0.5 0.2 - 1.0 MG/DL    ALT (SGPT) 74 12 - 78 U/L    AST (SGOT) 21 15 - 37 U/L    Alk. phosphatase 78 45 - 117 U/L    Protein, total 5.9 (L) 6.4 - 8.2 g/dL    Albumin 2.3 (L) 3.5 - 5.0 g/dL    Globulin 3.6 2.0 - 4.0 g/dL    A-G Ratio 0.6 (L) 1.1 - 2.2     MAGNESIUM    Collection Time: 09/02/21  4:53 AM   Result Value Ref Range    Magnesium 2.5 (H) 1.6 - 2.4 mg/dL   PHOSPHORUS    Collection Time: 09/02/21  4:53 AM   Result Value Ref Range    Phosphorus 3.9 2.6 - 4.7 MG/DL   GLUCOSE, POC    Collection Time: 09/02/21  6:18 AM   Result Value Ref Range    Glucose (POC) 157 (H) 65 - 117 mg/dL    Performed by Amy Gonzalez            Total time spent with patient:  xxx   min. Care Plan discussed with:  Patient     Family      RN      Consulting Physician [facility],         I have reviewed the flowsheets. Chart and Pertinent Notes have been reviewed. No change in PMH ,family and social history from Consult note.       Angela Zapien MD

## 2021-09-02 NOTE — PROGRESS NOTES
ICU Progress Note (Brief)     I spoke to Mr. Tamara Romero - Mrs. Bynum. Discussed his illness (on MV with sedation & paralytic). Long term goal is full recovery. I mentioned that we will have Pall Med consulted to help with her and her family.     Hunter Milligan,    Staff Intensivist/Anesthesiologist  Critical Care Medicine

## 2021-09-02 NOTE — PROGRESS NOTES
Orders received from Dr. Janet Adan this AM to wean Fio2 down to 70% for O2 sats greater than 90.     0920: RN and RT in room, patient lavaged, suctioned clots out of ETT. Patient desats to mid 63's. Inline suction and HME changed. Fio2 decreased to 70%, sats 87-90.    0948: O2 sats down again. Fio2 up to 80%    1100: Patient desats again. Fio2 back to 100%. 2 RN'S in room, more sedation/paralyzed.  O2 sats 88-93

## 2021-09-02 NOTE — PROGRESS NOTES
Attempted visit for palliative spiritual assessment. Unable to visit patient at this time due to Covid isolation precautions. Additionally, pt is on vent support. Pt's chart was consulted.   Chaplain Girard MDiv, MS, [facility]  [phone]

## 2021-09-02 NOTE — PROGRESS NOTES
[facility]    ICU TEAM Progress Note    Name: Fransisco Hale   : 1975   MRN: [id number]   Date: 2021      Assessment:   Reason for ICU Admission: Acute hypoxic respiratory failure secondary to COVID-19 Pneumonia     Brief HPI: 50 yo male who presented to the ED on  with no prior PMH with SOB x2 days. [facility] had developed body aches and fever as of .  He was diagnosed with COVID-19 pneumonia.  He was not vaccinated. He was started on dexamethasone, remdesivir -, and tocilizumab . He was admitted to the ICU on 8/15 for worsening hypoxia and required BiPAP.  He remained in the ICU from 8/15- on BiPAP.  He weaned from 100% to 80%  and was able to get himself from bed to chair. He was transferred to the [facility] on . FIO2 further decreased to 70% on . Hi Flow was attempted , but he quickly desaturated to 72%.  He was placed back on BiPAP at 80% FIO2 (IPAP/EPAP not listed).  CXR on  noted to have increased infiltrates. [facility] was able to take a break from BiPAP on  to eat, transitioning from HiFlow, but again became hypoxic, needing to go back to BiPAP. Plan:     Neuro: versed, fentanyl, precedex, propofol; oxy/valium enterally; plan SAT once FiO2 is 0.50  Pulm:   - Mechanical ventilation, goal is for protective ventilatory strategy.  Tidal volume of 6 to 8mL/kg of ideal body weight [450cc goal]. - ABGs improving.   - Shalini off  - Nimbex resumed  - Continue methylpred taper  - Diuresis as needed  - DVT scan NEG & has received Toci  - CXR tomorrow  - Wean FiO2 for SpO2 > 90%  Cardiac: Remains HDS  Renal: Hyperkalemia - resolved. Creatinine plateaued, good urine output. Appreciate Neph  GI: Nepro TFs given prior hyperkalemia. Senna, colace. Triglycerides better as of 3/31  ID: Antibiotics off. Procal nL.    Heme: Lovenox  Endo: Cont methylpred taper  MSK:  ROM    F - Feeding:  Yes   A - Analgesia: Fentanyl  S - Sedation: Propofol, Precedex and Versed  T - DVT Prophylaxis: SCD's or Sequential Compression Device and Lovenox   C - Code Status: Full Code  H - Head of Bed: > 30 Degrees  U - Ulcer Prophylaxis: Pepcid (famotidine)   G - Glycemic Control: Insulin  S - Spontaneous Breathing Trial: No  B - Bowel Regimen: Senna  I - Indwelling Catheter:   Tubes: ETT and Orogastric Tube  Lines: Peripheral IV and Central Line  Drains: Brownlee Catheter  D - De-escalation of Antibiotics: cefepime    Subjective:   Overnight Events:   : oxygenation improving  : off paralytic  : Shalini weaned off. HDS.   : Improvement in oxygenation. Shalini wean ongoing.   : hypoxia and worsened hypercapnia overnight. Placed on bilevel. : Hyperkalemia marginally better with lokema. Liver enzymes improving.   : Patient condition worsened when he was placed in prone position. Led to significant hypoxia. Improved with positioning him back in supine position and adjusting mechanical ventilation. Remained heavily sedated and paralyzed  : Remains tenuous, no improvement in oxygen requirement or ventilatory support.  Requiring higher doses for sedation  : Intubated early morning on the .  Sedated.     Objective:     Visit Vitals  BP (!) 144/80 (BP 1 Location: Left upper arm, BP Patient Position: At rest)   Pulse 82   Temp 97.8 °F (36.6 °C)   Resp 30   Ht 5' 10\" (1.778 m)   Wt 113.6 kg (250 lb 7.1 oz)   SpO2 97%   BMI 35.93 kg/m²    O2 Flow Rate (L/min): 60 l/min O2 Device: Endotracheal tube, Ventilator Temp (24hrs), Av.8 °F (37.1 °C), Min:97.8 °F (36.6 °C), Max:100 °F (37.8 °C)     Ventilator Settings:  Mode Rate Tidal Volume Pressure FiO2 PEEP   Assist control, Pressure control   400 ml    100 % 16 cm H20     Peak airway pressure: 45 cm H2O    Minute ventilation: 16.3 l/min        Intake/Output:     Intake/Output Summary (Last 24 hours) at 2021 0918  Last data filed at 2021 0700  Gross per 24 hour   Intake 2850.19 ml   Output 3995 ml   Net -1144.81 ml Physical Exam:  General:   Sedated intubated on mechanical ventilation   Eyes:  Sclera anicteric. Pupils equally round and reactive to light. Mouth/Throat: Mucous membranes normal, oral pharynx clear, ET tube, OG tube   Neck: Supple   Lungs:    Coarse crepitation bilaterally on auscultation   CV:  Regular rate and rhythm   Abdomen:   Soft, non-tender. bowel sounds normal. non-distended   Lines/Devices:  Intact, no erythema, drainage or tenderness   Psych:  Deeply sedated, withdraw to painful stimuli     24h Labs & Data: Reviewed    Medications: Reviewed    8/21 TTE: LVEF is 65 - 70%. Normal cavity size, systolic function (ejection fraction normal) and diastolic function. Mild concentric hypertrophy. Wall motion: normal. Normal left ventricular strain. Multidisciplinary Rounds Completed:  No    ABCDEF Bundle/Checklist Completed: Yes    SPECIAL EQUIPMENT: None    DISPOSITION: Stay in ICU    CRITICAL CARE CONSULTANT NOTE  I had a face to face encounter with the patient, reviewed and interpreted patient data including clinical events, labs, images, vital signs, I/O's, and examined patient. I have discussed the case and the plan and management of the patient's care with the consulting services, the bedside nurses and the respiratory therapist.      NOTE OF PERSONAL INVOLVEMENT IN CARE   This patient has a high probability of imminent, clinically significant deterioration, which requires the highest level of preparedness to intervene urgently. I participated in the decision-making and personally managed or directed the management of the following life and organ supporting interventions that required my frequent assessment to treat or prevent imminent deterioration. I personally spent 110 minutes of critical care time. This is time spent at this critically ill patient's bedside actively involved in patient care as well as the coordination of care and discussions with the patient's family.  This does not include any procedural time which has been billed separately.     Anjel Riddle DO  Staff Intensivist/Anesthesiologist  [facility]  9/2/2021

## 2021-09-02 NOTE — CONSULTS
Palliative Medicine Consult  Christian: [phone])    Patient Name: Dane Spicer  YOB: 1975    Date of Initial Consult: 9/2/21  Reason for Consult: Overwhelming symptoms  Requesting Provider: Dr. Jann Chung  Primary Care Physician: None     SUMMARY:   Dane Spicer is a 51 y.o. male with no significant medical history, who was admitted on 8/14/2021 from home with a diagnosis of acute hypoxic respiratory failure. He presented with complaints of shortness of breath and was found to have Covid pneumonia. He was admitted to ICU and placed on BiPAP. His condition worsened and he was intubated 8/25. He is currently critically ill on the vent and on paralytics. Current medical issues leading to Palliative Medicine involvement include: critically ill, acute respiratory failure, Covid-19 pneumonia. Social: He has a girlfriend, Demetria Haas. PALLIATIVE DIAGNOSES:   1. Palliative care encounter  2. Covid-19 pneumonia  3. Acute hypoxic respiratory failure  4. NABEEL       PLAN:   1. Prior to seeing the patient, the medical record was reviewed. Patient seen through the glass door of ICU. 2. Call placed to girlfriend/Denisha Ribera and palliative medicine services introduced. 3. Demetria Haas explained that the patient and their entire family had Covid, but he became much sicker than everyone else. She says that she is going to remain optimistic, but also realistic. She wants to continue all the current care and take things day by day. Emotional support provided. 4. Demetria Haas also shares that she had another family member in the ICU in 2019 and is familiar with the journey through a critical illness with a loved one, which is helping her navigate this experience better. 5. I let Demetria Haas know that our team would be following along in the patient's care and checking in with her for support. I also encouraged her to call me at any time for support or assistance.   6. Initial consult note routed to primary continuity provider and/or primary health care team members  7. Communicated plan of care with: Palliative IDT, [facility] Team     GOALS OF CARE / TREATMENT PREFERENCES:     GOALS OF CARE:  Patient/Health Care Proxy Stated Goals: Cure (recover from Covid)    TREATMENT PREFERENCES:   Code Status: Full Code    Advance Care Planning:  [x] [facility] Interdisciplinary Team has updated the ACP Navigator with [address] and Patient Capacity      Primary Decision Maker: Butch Santamaria - Girlfriend - [phone]    Secondary Decision Maker: Chayo Portillo - Sister - [phone]     Advance Care Planning 8/23/2021   Patient's Healthcare Decision Maker is: Named in scanned ACP document   Confirm Advance Directive -       Medical Interventions: Full interventions     Other Instructions: Other:    As far as possible, the palliative care team has discussed with patient / health care proxy about goals of care / treatment preferences for patient. HISTORY:     History obtained from: chart    CHIEF COMPLAINT: Shortness of breath    HPI/SUBJECTIVE:    The patient is:   [] Verbal and participatory  [x] Non-participatory due to: intubated on vent, sedated, paralyzed    His girlfriend reports that he and their entire family had Covid at the same time, but he became much sicker. He was complaining of shortness of breath on admission.     Clinical Pain Assessment (nonverbal scale for severity on nonverbal patients):   Clinical Pain Assessment  Severity: 0     Activity (Movement): Lying quietly, normal position    Duration: for how long has pt been experiencing pain (e.g., 2 days, 1 month, years)  Frequency: how often pain is an issue (e.g., several times per day, once every few days, constant)     FUNCTIONAL ASSESSMENT:     Palliative Performance Scale (PPS):  PPS: 20       PSYCHOSOCIAL/SPIRITUAL SCREENING:     Palliative IDT has assessed this patient for cultural preferences / practices and a referral made as appropriate to needs (Cultural Services, Patient Advocacy, Ethics, etc.)    Any spiritual / Zoroastrian concerns:  [] Yes /  [x] No    Caregiver Burnout:  [] Yes /  [x] No /  [] No Caregiver Present      Anticipatory grief assessment:   [x] Normal  / [] Maladaptive       ESAS Anxiety:      ESAS Depression:          REVIEW OF SYSTEMS:     Positive and pertinent negative findings in ROS are noted above in HPI. The following systems were [] reviewed / [x] unable to be reviewed as noted in HPI  Other findings are noted below. Systems: constitutional, ears/nose/mouth/throat, respiratory, gastrointestinal, genitourinary, musculoskeletal, integumentary, neurologic, psychiatric, endocrine. Positive findings noted below. Modified ESAS Completed by: provider   Fatigue: 10 Drowsiness: 10     Pain: 0           Dyspnea: 0           Stool Occurrence(s): 1        PHYSICAL EXAM:     From RN flowsheet:  Wt Readings from Last 3 Encounters:   09/02/21 250 lb 7.1 oz (113.6 kg)   06/10/18 257 lb (116.6 kg)   04/01/14 246 lb (111.6 kg)     Blood pressure 134/84, pulse 66, temperature 97.4 °F (36.3 °C), resp. rate 30, height 5' 10\" (1.778 m), weight 250 lb 7.1 oz (113.6 kg), SpO2 96 %. Pain Scale 1: Adult Nonverbal Pain Scale  Pain Intensity 1: 0  Pain Onset 1: acute  Pain Location 1: Back  Pain Orientation 1: Mid, Lower  Pain Description 1: Aching  Pain Intervention(s) 1: Medication (see MAR), Repositioned  Last bowel movement, if known:     Constitutional: intubated on vent, ill appearing  ENMT: ETT intact  Cardiovascular: regular rhythm  Respiratory: breathing not labored, symmetric  Neurologic: sedated     HISTORY:     Active Problems:    Severe sepsis ([address] (8/14/2021)      Pneumonia due to COVID-19 virus (8/14/2021)      No past medical history on file. Past Surgical History:   Procedure Laterality Date    HX HEENT      ORAL SURGERY    HX ORTHOPAEDIC      left knee      No family history on file.    History reviewed, no pertinent family history. Social History     Tobacco Use    Smoking status: Former Smoker     Packs/day: 1.00     Years: 10.00     Pack years: 10.00    Smokeless tobacco: Never Used   Substance Use Topics    Alcohol use:  Yes     Alcohol/week: 3.3 standard drinks     Types: 4 Cans of beer per week     No Known Allergies   Current Facility-Administered Medications   Medication Dose Route Frequency    cisatracurium (NIMBEX) 2 mg/mL IV infusion (UNDILUTED)  0.5-10 mcg/kg/min IntraVENous TITRATE    oxyCODONE IR (ROXICODONE) tablet 15 mg  15 mg Oral Q8H    diazePAM (VALIUM) tablet 5 mg  5 mg Oral Q8H    famotidine (PF) (PEPCID) 20 mg in 0.9% sodium chloride 10 mL injection  20 mg IntraVENous Q12H    methylPREDNISolone (PF) (SOLU-MEDROL) injection 20 mg  20 mg IntraVENous Q6H    insulin NPH (NOVOLIN N, HUMULIN N) injection 5 Units  5 Units SubCUTAneous Q12H    insulin lispro (HUMALOG) injection   SubCUTAneous Q6H    hydrALAZINE (APRESOLINE) 20 mg/mL injection 20 mg  20 mg IntraVENous Q6H PRN    chlorthalidone (HYGROTON) tablet 25 mg  25 mg Oral DAILY    dextrose (D50W) injection syrg 12.5-25 g  12.5-25 g IntraVENous PRN    white petrolatum-mineral oiL (AKWA TEARS) 83-15 % ophthalmic ointment   Both Eyes Q12H    midazolam in normal saline (VERSED) 1 mg/mL infusion  0-20 mg/hr IntraVENous TITRATE    balsam peru-castor oiL (VENELEX) ointment   Topical PRN    propofol (DIPRIVAN) 10 mg/mL infusion  0-50 mcg/kg/min IntraVENous TITRATE    fentaNYL (PF) 1,500 mcg/30 mL (50 mcg/mL) infusion  0-300 mcg/hr IntraVENous TITRATE    chlorhexidine (ORAL CARE KIT) 0.12 % mouthwash 15 mL  15 mL Oral Q12H    docusate (COLACE) 50 mg/5 mL oral liquid 100 mg  100 mg Per NG tube DAILY    dexmedeTOMidine in 0.9 % NaCl (PRECEDEX) 400 mcg/100 mL (4 mcg/mL) infusion soln  0.1-1.5 mcg/kg/hr IntraVENous TITRATE    glucose chewable tablet 16 g  4 Tablet Oral PRN    dextrose (D50W) injection syrg 12.5-25 g  12.5-25 g IntraVENous PRN    glucagon (GLUCAGEN) injection 1 mg  1 mg IntraMUSCular PRN    senna (SENOKOT) tablet 17.2 mg  2 Tablet Oral DAILY    sodium chloride (NS) flush 5-40 mL  5-40 mL IntraVENous Q8H    sodium chloride (NS) flush 5-40 mL  5-40 mL IntraVENous PRN    acetaminophen (TYLENOL) tablet 650 mg  650 mg Oral Q6H PRN    polyethylene glycol (MIRALAX) packet 17 g  17 g Oral DAILY PRN    ondansetron (ZOFRAN) injection 4 mg  4 mg IntraVENous Q6H PRN    enoxaparin (LOVENOX) injection 40 mg  40 mg SubCUTAneous Q12H          LAB AND IMAGING FINDINGS:     Lab Results   Component Value Date/Time    WBC 16.2 (H) 09/02/2021 04:53 AM    HGB 11.5 (L) 09/02/2021 04:53 AM    PLATELET 585 (L) 66/53/8077 04:53 AM     Lab Results   Component Value Date/Time    Sodium 144 09/02/2021 04:53 AM    Sodium 144 09/02/2021 04:53 AM    Potassium 5.0 09/02/2021 04:53 AM    Potassium 5.0 09/02/2021 04:53 AM    Chloride 112 (H) 09/02/2021 04:53 AM    Chloride 112 (H) 09/02/2021 04:53 AM    CO2 26 09/02/2021 04:53 AM    CO2 27 09/02/2021 04:53 AM    BUN 80 (H) 09/02/2021 04:53 AM    BUN 83 (H) 09/02/2021 04:53 AM    Creatinine 1.91 (H) 09/02/2021 04:53 AM    Creatinine 1.94 (H) 09/02/2021 04:53 AM    Calcium 8.6 09/02/2021 04:53 AM    Calcium 8.6 09/02/2021 04:53 AM    Magnesium 2.5 (H) 09/02/2021 04:53 AM    Phosphorus 4.0 09/02/2021 04:53 AM    Phosphorus 3.9 09/02/2021 04:53 AM      Lab Results   Component Value Date/Time    Alk.  phosphatase 78 09/02/2021 04:53 AM    Protein, total 5.9 (L) 09/02/2021 04:53 AM    Albumin 2.3 (L) 09/02/2021 04:53 AM    Albumin 2.3 (L) 09/02/2021 04:53 AM    Globulin 3.6 09/02/2021 04:53 AM     Lab Results   Component Value Date/Time    INR 1.1 08/16/2021 03:41 AM    Prothrombin time 11.3 (H) 08/16/2021 03:41 AM    aPTT 28.5 08/16/2021 03:41 AM      Lab Results   Component Value Date/Time    Ferritin 2,783 (H) 08/16/2021 03:41 AM      Lab Results   Component Value Date/Time    pH 7.35 09/02/2021 04:50 AM    PCO2 49 (H) 09/02/2021 04:50 AM    PO2 168 (H) 09/02/2021 04:50 AM     No components found for: [place]   Lab Results   Component Value Date/Time     08/27/2021 05:56 PM                Total time: 50 min  Counseling / coordination time, spent as noted above: 30 min  > 50% counseling / coordination?: yes    Prolonged service was provided for  []30 min   []75 min in face to face time in the presence of the patient, spent as noted above. Time Start:   Time End:   Note: this can only be billed with 58273 (initial) or 66626 (follow up). If multiple start / stop times, list each separately.

## 2021-09-02 NOTE — PROGRESS NOTES
0730: Bedside and Verbal shift change report given to [address] RN (oncoming nurse) by Jorge Pastor (offgoing nurse). Report included the following information SBAR, Kardex, Intake/Output, MAR, Recent Results, Cardiac Rhythm NSR and Alarm Parameters . 0920: Patient hypoxic in the 80's. RN and RT at bedside. Suctioned a moderate size clot out of ETT. RT replaced inline suction catheter. 0930: Received orders to push 50 mg of alysa per Dianne KOENIG. Advanced OGT from 55 to 64. Order for KUB placed and feeds held. 0945: Patient remaining supine until condition is stable. 1045: Patients saturations rapidly dropped to the 60's. RT, RN, and NP at bedside. Verbal order received for 5 of versed IV push. Minimal recovery, verbal order received for 50 mg of Alysa IV push. Maxed all sedation and verbal order received to restart Nimbex. Vent placed back to 100% FiO2. Patients remains supine until stable and tube feeds held until stable. 1930: Bedside and Verbal shift change report given to Tavo Valdivia (oncoming nurse) by [address] RN (offgoing nurse). Report included the following information SBAR, Kardex, Intake/Output, MAR, Recent Results, Cardiac Rhythm SR/ST and Alarm Parameters .

## 2021-09-03 NOTE — PROGRESS NOTES
Bedside shift change report given to Pattie Luna RN  (oncoming nurse) by TONA Robles  (offgoing nurse). Report included the following information SBAR, Procedure Summary, MAR, Cardiac Rhythm SB-NSR  and Dual Neuro Assessment. Rounds: Plan for today is to wean off  Nimbex, no changes with sedation, start bumex. Lactic acid daily per dr. Foster Mayowright.     1339: Nimbex stopped. Bedside shift change report given to Selma Weldon  (oncoming nurse) by Pattie Luna RN (offgoing nurse). Report included the following information SBAR, Intake/Output, MAR, Recent Results, Alarm Parameters  and Dual Neuro Assessment.

## 2021-09-03 NOTE — PROGRESS NOTES
4625: RN and this RT noticed pt had massive air leak despite putting several cc's of air in cuff. Advanced tube to 24 at teeth, air leak resolved using MLT.  Vt 500+

## 2021-09-03 NOTE — PROGRESS NOTES
[facility]   [address] [facility], [address] [place] [address], [facility] [place]  Phone: [phone]   [phone]       Nephrology Progress Note  Jay Rivera     1975     [id number]  Date of Admission : 8/14/2021 09/03/21    CC: Follow up for NABEEL    Assessment and Plan   NABEEL :  - presumed to be 2/2 COVID illness.  -Creatinine trending down and has good urine output.  -Chest x-ray is improving.  -Given his 100% FiO2 requirement, trial of Bumex to increase diuresis. Continue with chlorthalidone  - cont present care  - daily labs    Lytes:  -Stable    COVID 19 PNA   Acute resp failure: on vent (8/25)  - Off Shalini   - on Cefepime, steroids    - received Tocilizumab and remdesivir   - per [facility]          Interval History:  Examined outside room. Stable Cr and UOP. Stable night per RN. Remains sedated on the vent    Review of Systems: Review of systems not obtained due to patient factors.     Current Medications:   Current Facility-Administered Medications   Medication Dose Route Frequency    cisatracurium (NIMBEX) 2 mg/mL IV infusion (UNDILUTED)  0.5-10 mcg/kg/min IntraVENous TITRATE    oxyCODONE IR (ROXICODONE) tablet 15 mg  15 mg Oral Q8H    diazePAM (VALIUM) tablet 5 mg  5 mg Oral Q8H    famotidine (PF) (PEPCID) 20 mg in 0.9% sodium chloride 10 mL injection  20 mg IntraVENous Q12H    methylPREDNISolone (PF) (SOLU-MEDROL) injection 20 mg  20 mg IntraVENous Q6H    insulin NPH (NOVOLIN N, HUMULIN N) injection 5 Units  5 Units SubCUTAneous Q12H    insulin lispro (HUMALOG) injection   SubCUTAneous Q6H    hydrALAZINE (APRESOLINE) 20 mg/mL injection 20 mg  20 mg IntraVENous Q6H PRN    chlorthalidone (HYGROTON) tablet 25 mg  25 mg Oral DAILY    dextrose (D50W) injection syrg 12.5-25 g  12.5-25 g IntraVENous PRN    white petrolatum-mineral oiL (AKWA TEARS) 83-15 % ophthalmic ointment   Both Eyes Q12H    midazolam in normal saline (VERSED) 1 mg/mL infusion  0-20 mg/hr IntraVENous TITRATE    balsam peru-castor oiL (VENELEX) ointment   Topical PRN    propofol (DIPRIVAN) 10 mg/mL infusion  0-50 mcg/kg/min IntraVENous TITRATE    fentaNYL (PF) 1,500 mcg/30 mL (50 mcg/mL) infusion  0-300 mcg/hr IntraVENous TITRATE    chlorhexidine (ORAL CARE KIT) 0.12 % mouthwash 15 mL  15 mL Oral Q12H    docusate (COLACE) 50 mg/5 mL oral liquid 100 mg  100 mg Per NG tube DAILY    dexmedeTOMidine in 0.9 % NaCl (PRECEDEX) 400 mcg/100 mL (4 mcg/mL) infusion soln  0.1-1.5 mcg/kg/hr IntraVENous TITRATE    glucose chewable tablet 16 g  4 Tablet Oral PRN    dextrose (D50W) injection syrg 12.5-25 g  12.5-25 g IntraVENous PRN    glucagon (GLUCAGEN) injection 1 mg  1 mg IntraMUSCular PRN    senna (SENOKOT) tablet 17.2 mg  2 Tablet Oral DAILY    sodium chloride (NS) flush 5-40 mL  5-40 mL IntraVENous Q8H    sodium chloride (NS) flush 5-40 mL  5-40 mL IntraVENous PRN    acetaminophen (TYLENOL) tablet 650 mg  650 mg Oral Q6H PRN    polyethylene glycol (MIRALAX) packet 17 g  17 g Oral DAILY PRN    ondansetron (ZOFRAN) injection 4 mg  4 mg IntraVENous Q6H PRN    enoxaparin (LOVENOX) injection 40 mg  40 mg SubCUTAneous Q12H      No Known Allergies    Objective:  Vitals:    Vitals:    09/03/21 0500 09/03/21 0600 09/03/21 0700 09/03/21 0745   BP: (!) 141/91 (!) 142/87 (!) 142/85    Pulse: (!) 58 (!) 58 (!) 59 60   Resp: 30 30 30 30   Temp:       SpO2: 99% 99% 99% 98%   Weight: 116.4 kg (256 lb 9.9 oz)      Height:         Intake and Output:  No intake/output data recorded.   09/01 1901 - 09/03 0700  In: 4175.9 [I.V.:2640.9]  Out: 2533 [Urine:5395; Drains:500]    Physical Examination:not examined on room   Pt intubated    Yes   General: Sedated on vent   Neck:  Supple, no mass  Resp:  Symmetrical chest movements  CV:  RRR on monitor   GI:  Non distended   Neurologic:  sedated  :  [place]     []    High complexity decision making was performed  []    Patient is at high-risk of decompensation with multiple organ involvement    Lab Data Personally Reviewed: I have reviewed all the pertinent labs, microbiology data and radiology studies during assessment. Recent Labs     09/03/21 0353 09/02/21 0453 09/01/21 0318    144  144 142  142   K 4.6 5.0  5.0 4.5  4.4   * 112*  112* 111*  111*   CO2 26 26  27 23  25   * 177*  178* 195*  198*   BUN 84* 80*  83* 85*  88*   CREA 1.89* 1.91*  1.94* 2.01*  2.03*   CA 8.6 8.6  8.6 8.4*  8.8   MG 2.5* 2.5* 2.5*   PHOS 3.5 4.0  3.9 4.0  3.6   ALB 2.3* 2.3*  2.3* 2.3*  2.4*   ALT 67 74 77     Recent Labs     09/03/21 0353 09/02/21 0453 09/01/21 0318   WBC 15.9* 16.2* 17.2*   HGB 11.5* 11.5* 12.1   HCT 35.3* 35.9* 37.1   * 126* 125*     No results found for: SDES  Lab Results   Component Value Date/Time    Culture result: NO GROWTH 5 DAYS 08/29/2021 02:37 PM    Culture result: MRSA NOT PRESENT 08/29/2021 02:37 PM    Culture result:  08/29/2021 02:37 PM     Screening of patient nares for MRSA is for surveillance purposes and, if positive, to facilitate isolation considerations in high risk settings. It is not intended for automatic decolonization interventions per se as regimens are not sufficiently effective to warrant routine use.     Culture result: HEAVY NORMAL RESPIRATORY JESSICA 08/23/2021 07:06 AM    Culture result: NO GROWTH 5 DAYS 08/22/2021 08:51 PM     Recent Results (from the past 24 hour(s))   GLUCOSE, POC    Collection Time: 09/02/21 11:12 AM   Result Value Ref Range    Glucose (POC) 156 (H) 65 - 117 mg/dL    Performed by Adan RAMIREZ, POC    Collection Time: 09/02/21  6:16 PM   Result Value Ref Range    Glucose (POC) 152 (H) 65 - 117 mg/dL    Performed by Avery Jung, POC    Collection Time: 09/03/21 12:26 AM   Result Value Ref Range    Glucose (POC) 179 (H) 65 - 117 mg/dL    Performed by BRENDA BEARD    MAGNESIUM    Collection Time: 09/03/21  3:53 AM   Result Value Ref Range    Magnesium 2.5 (H) 1.6 - 2.4 mg/dL PHOSPHORUS    Collection Time: 09/03/21  3:53 AM   Result Value Ref Range    Phosphorus 3.5 2.6 - 4.7 MG/DL   PROCALCITONIN    Collection Time: 09/03/21  3:53 AM   Result Value Ref Range    Procalcitonin 0.14 ng/mL   LACTIC ACID    Collection Time: 09/03/21  3:53 AM   Result Value Ref Range    Lactic acid 2.6 (HH) 0.4 - 2.0 MMOL/L   CBC W/O DIFF    Collection Time: 09/03/21  3:53 AM   Result Value Ref Range    WBC 15.9 (H) 4.1 - 11.1 K/uL    RBC 3.84 (L) 4.10 - 5.70 M/uL    HGB 11.5 (L) 12.1 - 17.0 g/dL    HCT 35.3 (L) 36.6 - 50.3 %    MCV 91.9 80.0 - 99.0 FL    MCH 29.9 26.0 - 34.0 PG    MCHC 32.6 30.0 - 36.5 g/dL    RDW 14.6 (H) 11.5 - 14.5 %    PLATELET 102 (L) 599 - 400 K/uL    MPV 12.1 8.9 - 12.9 FL    NRBC 0.0 0  WBC    ABSOLUTE NRBC 0.00 0.00 - 6.09 K/uL   METABOLIC PANEL, COMPREHENSIVE    Collection Time: 09/03/21  3:53 AM   Result Value Ref Range    Sodium 142 136 - 145 mmol/L    Potassium 4.6 3.5 - 5.1 mmol/L    Chloride 111 (H) 97 - 108 mmol/L    CO2 26 21 - 32 mmol/L    Anion gap 5 5 - 15 mmol/L    Glucose 188 (H) 65 - 100 mg/dL    BUN 84 (H) 6 - 20 MG/DL    Creatinine 1.89 (H) 0.70 - 1.30 MG/DL    BUN/Creatinine ratio 44 (H) 12 - 20      GFR est AA 47 (L) >60 ml/min/1.73m2    GFR est non-AA 38 (L) >60 ml/min/1.73m2    Calcium 8.6 8.5 - 10.1 MG/DL    Bilirubin, total 0.5 0.2 - 1.0 MG/DL    ALT (SGPT) 67 12 - 78 U/L    AST (SGOT) 18 15 - 37 U/L    Alk. phosphatase 77 45 - 117 U/L    Protein, total 6.1 (L) 6.4 - 8.2 g/dL    Albumin 2.3 (L) 3.5 - 5.0 g/dL    Globulin 3.8 2.0 - 4.0 g/dL    A-G Ratio 0.6 (L) 1.1 - 2.2     GLUCOSE, POC    Collection Time: 09/03/21  7:02 AM   Result Value Ref Range    Glucose (POC) 168 (H) 65 - 117 mg/dL    Performed by BRENDA BEARD            Total time spent with patient:  xxx   min. Care Plan discussed with:  Patient     Family      RN      Consulting Physician [address],         I have reviewed the flowsheets.   Chart and Pertinent Notes have been reviewed. No change in PMH ,family and social history from Consult note.       Sharon Eduardo MD

## 2021-09-03 NOTE — PROGRESS NOTES
[facility]    ICU TEAM Progress Note    Name: Sami Cespedes   : 1975   MRN: [id number]   Date: 9/3/2021      Assessment:   Reason for ICU Admission: Acute hypoxic respiratory failure secondary to COVID-19 Pneumonia     Brief HPI: 52 yo male who presented to the ED on  with no prior PMH with SOB x2 days. [facility] had developed body aches and fever as of .  He was diagnosed with COVID-19 pneumonia.  He was not vaccinated. He was started on dexamethasone, remdesivir -, and tocilizumab . He was admitted to the ICU on 8/15 for worsening hypoxia and required BiPAP.  He remained in the ICU from 8/15- on BiPAP.  He weaned from 100% to 80%  and was able to get himself from bed to chair. He was transferred to the [facility] on . FIO2 further decreased to 70% on . Hi Flow was attempted , but he quickly desaturated to 72%.  He was placed back on BiPAP at 80% FIO2 (IPAP/EPAP not listed).  CXR on  noted to have increased infiltrates. [facility] was able to take a break from BiPAP on  to eat, transitioning from HiFlow, but again became hypoxic, needing to go back to BiPAP. Plan:     Neuro: versed, fentanyl, precedex, propofol; oxy/valium enterally; plan SAT once FiO2 is 0.50  Pulm:   - Mechanical ventilation, goal is for protective ventilatory strategy.  Tidal volume of 6 to 8mL/kg of ideal body weight [450cc goal]. - ABGs improving.   - Shalini off  - Nimbex - attempt to wean off today  - Continue methylpred taper  - Diuresis as needed  - DVT scan NEG & has received Toci  - CXR tomorrow  - Wean FiO2 for SpO2 > 90%  Cardiac: Remains HDS  Renal: Hyperkalemia - resolved. Creatinine plateaued, good urine output. Appreciate Neph  GI: Nepro TFs given prior hyperkalemia. Senna, colace. Triglycerides better as of 3/31  ID: Antibiotics off. Procal nL.    Heme: Lovenox  Endo: Cont methylpred taper  MSK:  ROM    F - Feeding:  Yes   A - Analgesia: Fentanyl  S - Sedation: Propofol, Precedex and Versed  T - DVT Prophylaxis: SCD's or Sequential Compression Device and Lovenox   C - Code Status: Full Code  H - Head of Bed: > 30 Degrees  U - Ulcer Prophylaxis: Pepcid (famotidine)   G - Glycemic Control: Insulin  S - Spontaneous Breathing Trial: No  B - Bowel Regimen: Senna  I - Indwelling Catheter:   Tubes: ETT and Orogastric Tube  Lines: Peripheral IV and Central Line  Drains: Brownlee Catheter  D - De-escalation of Antibiotics: cefepime    Subjective:   Overnight Events:   9/3: reparalyzed, FiO2 at 1.0  : oxygenation improving  : off paralytic  : Shalini weaned off. HDS.   : Improvement in oxygenation. Shalini wean ongoing.   : hypoxia and worsened hypercapnia overnight. Placed on bilevel. : Hyperkalemia marginally better with lokema. Liver enzymes improving.   : Patient condition worsened when he was placed in prone position. Led to significant hypoxia. Improved with positioning him back in supine position and adjusting mechanical ventilation. Remained heavily sedated and paralyzed  : Remains tenuous, no improvement in oxygen requirement or ventilatory support.  Requiring higher doses for sedation  : Intubated early morning on the .  Sedated.     Objective:     Visit Vitals  BP (!) 142/85   Pulse 60   Temp 97.8 °F (36.6 °C)   Resp 30   Ht 5' 10\" (1.778 m)   Wt 116.4 kg (256 lb 9.9 oz)   SpO2 98%   BMI 36.82 kg/m²    O2 Flow Rate (L/min): 60 l/min O2 Device: Endotracheal tube Temp (24hrs), Av.6 °F (36.4 °C), Min:97.4 °F (36.3 °C), Max:97.8 °F (36.6 °C)     Ventilator Settings:  Mode Rate Tidal Volume Pressure FiO2 PEEP   Assist control, Pressure control   400 ml    100 % 16 cm H20     Peak airway pressure: 45 cm H2O    Minute ventilation: 17.2 l/min        Intake/Output:     Intake/Output Summary (Last 24 hours) at 9/3/2021 0803  Last data filed at 9/3/2021 0700  Gross per 24 hour   Intake 2577.51 ml   Output 3350 ml   Net -772.49 ml     Physical Exam:  General:   Sedated intubated on mechanical ventilation   Eyes:  Sclera anicteric. Pupils equally round and reactive to light. Mouth/Throat: Mucous membranes normal, oral pharynx clear, ET tube, OG tube   Neck: Supple   Lungs:    Coarse crepitation bilaterally on auscultation   CV:  Regular rate and rhythm   Abdomen:   Soft, non-tender. bowel sounds normal. non-distended   Lines/Devices:  Intact, no erythema, drainage or tenderness   Psych:  Deeply sedated, withdraw to painful stimuli     24h Labs & Data: Reviewed    Medications: Reviewed    8/21 TTE: LVEF is 65 - 70%. Normal cavity size, systolic function (ejection fraction normal) and diastolic function. Mild concentric hypertrophy. Wall motion: normal. Normal left ventricular strain. Multidisciplinary Rounds Completed:  No    ABCDEF Bundle/Checklist Completed: Yes    SPECIAL EQUIPMENT: None    DISPOSITION: Stay in ICU    CRITICAL CARE CONSULTANT NOTE  I had a face to face encounter with the patient, reviewed and interpreted patient data including clinical events, labs, images, vital signs, I/O's, and examined patient. I have discussed the case and the plan and management of the patient's care with the consulting services, the bedside nurses and the respiratory therapist.      NOTE OF PERSONAL INVOLVEMENT IN CARE   This patient has a high probability of imminent, clinically significant deterioration, which requires the highest level of preparedness to intervene urgently. I participated in the decision-making and personally managed or directed the management of the following life and organ supporting interventions that required my frequent assessment to treat or prevent imminent deterioration. I personally spent 110 minutes of critical care time. This is time spent at this critically ill patient's bedside actively involved in patient care as well as the coordination of care and discussions with the patient's family.  This does not include any procedural time which has been billed separately.     Noemí Wagner DO  Staff Intensivist/Anesthesiologist  [facility]  9/3/2021

## 2021-09-04 NOTE — PROGRESS NOTES
Problem: Risk for Spread of Infection  Goal: Prevent transmission of infectious organism to others  Description: Prevent the transmission of infectious organisms to other patients, staff members, and visitors. Outcome: Progressing Towards Goal     Problem: Patient Education:  Go to Education Activity  Goal: Patient/Family Education  Outcome: Progressing Towards Goal     Problem: Breathing Pattern - Ineffective  Goal: *Absence of hypoxia  Outcome: Progressing Towards Goal  Goal: *Use of effective breathing techniques  Outcome: Progressing Towards Goal  Goal: *PALLIATIVE CARE:  Alleviation of Dyspnea  Outcome: Progressing Towards Goal     Problem: Patient Education: Go to Patient Education Activity  Goal: Patient/Family Education  Outcome: Progressing Towards Goal     Problem: Airway Clearance - Ineffective  Goal: Achieve or maintain patent airway  Outcome: Progressing Towards Goal     Problem: Gas Exchange - Impaired  Goal: Absence of hypoxia  Outcome: Progressing Towards Goal  Goal: Promote optimal lung function  Outcome: Progressing Towards Goal     Problem: Breathing Pattern - Ineffective  Goal: Ability to achieve and maintain a regular respiratory rate  Outcome: Progressing Towards Goal     Problem:  Body Temperature -  Risk of, Imbalanced  Goal: Ability to maintain a body temperature within defined limits  Outcome: Progressing Towards Goal  Goal: Will regain or maintain usual level of consciousness  Outcome: Progressing Towards Goal  Goal: Complications related to the disease process, condition or treatment will be avoided or minimized  Outcome: Progressing Towards Goal     Problem: Isolation Precautions - Risk of Spread of Infection  Goal: Prevent transmission of infectious organism to others  Outcome: Progressing Towards Goal     Problem: Nutrition Deficits  Goal: Optimize nutrtional status  Outcome: Progressing Towards Goal     Problem: Risk for Fluid Volume Deficit  Goal: Maintain normal heart rhythm  Outcome: Progressing Towards Goal  Goal: Maintain absence of muscle cramping  Outcome: Progressing Towards Goal  Goal: Maintain normal serum potassium, sodium, calcium, phosphorus, and pH  Outcome: Progressing Towards Goal     Problem: Loneliness or Risk for Loneliness  Goal: Demonstrate positive use of time alone when socialization is not possible  Outcome: Progressing Towards Goal     Problem: Fatigue  Goal: Verbalize increase energy and improved vitality  Outcome: Progressing Towards Goal     Problem: Patient Education: Go to Patient Education Activity  Goal: Patient/Family Education  Outcome: Progressing Towards Goal     Problem: Falls - Risk of  Goal: *Absence of Falls  Description: Document Simeon Fall Risk and appropriate interventions in the flowsheet. Outcome: Progressing Towards Goal  Note: Fall Risk Interventions:       Mentation Interventions: Adequate sleep, hydration, pain control    Medication Interventions: Evaluate medications/consider consulting pharmacy    Elimination Interventions:  Toilet paper/wipes in reach    History of Falls Interventions: Evaluate medications/consider consulting pharmacy         Problem: Patient Education: Go to Patient Education Activity  Goal: Patient/Family Education  Outcome: Progressing Towards Goal     Problem: Pain  Goal: *Control of Pain  Outcome: Progressing Towards Goal  Goal: *PALLIATIVE CARE:  Alleviation of Pain  Outcome: Progressing Towards Goal     Problem: Patient Education: Go to Patient Education Activity  Goal: Patient/Family Education  Outcome: Progressing Towards Goal     Problem: Nutrition Deficit  Goal: *Optimize nutritional status  Outcome: Progressing Towards Goal     Problem: Ventilator Management  Goal: *Adequate oxygenation and ventilation  Outcome: Progressing Towards Goal  Goal: *Patient maintains clear airway/free of aspiration  Outcome: Progressing Towards Goal  Goal: *Absence of infection signs and symptoms  Outcome: Progressing Towards Goal  Goal: *Normal spontaneous ventilation  Outcome: Progressing Towards Goal     Problem: Patient Education: Go to Patient Education Activity  Goal: Patient/Family Education  Outcome: Progressing Towards Goal     Problem: Non-Violent Restraints  Goal: Removal from restraints as soon as assessed to be safe  Outcome: Progressing Towards Goal  Goal: No harm/injury to patient while restraints in use  Outcome: Progressing Towards Goal  Goal: Patient's dignity will be maintained  Outcome: Progressing Towards Goal  Goal: Patient Interventions  Outcome: Progressing Towards Goal     Problem: Pressure Injury - Risk of  Goal: *Prevention of pressure injury  Description: Document Boo Scale and appropriate interventions in the flowsheet.   Outcome: Progressing Towards Goal     Problem: Patient Education: Go to Patient Education Activity  Goal: Patient/Family Education  Outcome: Progressing Towards Goal

## 2021-09-04 NOTE — PROGRESS NOTES
[facility]    ICU TEAM Progress Note    Name: Loretta Hernández   : 1975   MRN: [id number]   Date: 2021      Assessment:   Reason for ICU Admission: Acute hypoxic respiratory failure secondary to COVID-19 Pneumonia     Brief HPI: 52 yo male who presented to the ED on  with no prior PMH with SOB x2 days. [facility] had developed body aches and fever as of .  He was diagnosed with COVID-19 pneumonia.  He was not vaccinated. He was started on dexamethasone, remdesivir -, and tocilizumab . He was admitted to the ICU on 8/15 for worsening hypoxia and required BiPAP.  He remained in the ICU from 8/15- on BiPAP.  He weaned from 100% to 80%  and was able to get himself from bed to chair. He was transferred to the [facility] on . FIO2 further decreased to 70% on . Hi Flow was attempted , but he quickly desaturated to 72%.  He was placed back on BiPAP at 80% FIO2 (IPAP/EPAP not listed).  CXR on  noted to have increased infiltrates. [facility] was able to take a break from BiPAP on  to eat, transitioning from HiFlow, but again became hypoxic, needing to go back to BiPAP. Plan:     Neuro: Versed, fentanyl, precedex, propofol; oxy/valium enterally; plan SAT once FiO2 is 0.50  -Wean Versed by 1 mg/hr  Pulm:   - Mechanical ventilation, goal is for protective ventilatory strategy.  Tidal volume of 6 mL/kg of ideal body weight  - Shalini off  - Nimbex off  - Continue methylpred taper  - Diuresis as needed  - DVT scan NEG & has received Toci  - Wean FiO2 for SpO2 > 90%  Cardiac: Remains HDS  Renal: Hyperkalemia - resolved. Creatinine plateaued, good urine output. Appreciate Neph  GI: Nepro TFs given prior hyperkalemia. Senna, colace. Triglycerides better as of 3/31  ID: Antibiotics off. Procal nL.    Heme: Lovenox  Endo: Cont methylpred taper  MSK:  ROM    F - Feeding:  Yes   A - Analgesia: Fentanyl  S - Sedation: Propofol, Precedex and Versed  T - DVT Prophylaxis: SCD's or Sequential Compression Device and Lovenox   C - Code Status: Full Code  H - Head of Bed: > 30 Degrees  U - Ulcer Prophylaxis: Pepcid (famotidine)   G - Glycemic Control: Insulin  S - Spontaneous Breathing Trial: No  B - Bowel Regimen: Senna  I - Indwelling Catheter:   Tubes: ETT and Orogastric Tube  Lines: Peripheral IV and Central Line  Drains: Brownlee Catheter  D - De-escalation of Antibiotics: cefepime    Subjective:   Overnight Events:   : Sedated, FiO2 at 1.0  9/3: reparalyzed, FiO2 at 1.0  : oxygenation improving  : off paralytic  : Shalini weaned off. HDS.   : Improvement in oxygenation. Shalini wean ongoing.   : hypoxia and worsened hypercapnia overnight. Placed on bilevel. : Hyperkalemia marginally better with lokema. Liver enzymes improving.   : Patient condition worsened when he was placed in prone position. Led to significant hypoxia. Improved with positioning him back in supine position and adjusting mechanical ventilation. Remained heavily sedated and paralyzed  : Remains tenuous, no improvement in oxygen requirement or ventilatory support.  Requiring higher doses for sedation  : Intubated early morning on the .  Sedated.     Objective:     Visit Vitals  /84   Pulse 69   Temp 97.1 °F (36.2 °C)   Resp 30   Ht 5' 10\" (1.778 m)   Wt 116.4 kg (256 lb 9.9 oz)   SpO2 97%   BMI 36.82 kg/m²    O2 Flow Rate (L/min): 60 l/min O2 Device: Endotracheal tube Temp (24hrs), Av °F (36.7 °C), Min:97.1 °F (36.2 °C), Max:98.7 °F (37.1 °C)     Ventilator Settings:  Mode Rate Tidal Volume Pressure FiO2 PEEP   Assist control, Pressure control   400 ml    100 % 16 cm H20     Peak airway pressure: 45 cm H2O    Minute ventilation: 17.2 l/min        Intake/Output:     Intake/Output Summary (Last 24 hours) at 2021 0739  Last data filed at 2021 0711  Gross per 24 hour   Intake 2422.75 ml   Output 5440 ml   Net -3017.25 ml     Physical Exam:  General:   Sedated intubated on mechanical ventilation   Eyes:  Sclera anicteric. Pupils equally round and reactive to light. Mouth/Throat: Mucous membranes normal, oral pharynx clear, ET tube, OG tube   Neck: Supple   Lungs:    Coarse crepitation bilaterally on auscultation   CV:  Regular rate and rhythm   Abdomen:   Soft, non-tender. bowel sounds normal. non-distended   Lines/Devices:  Intact, no erythema, drainage or tenderness   Psych:  Deeply sedated, withdraw to painful stimuli     24h Labs & Data: Reviewed    Medications: Reviewed    8/21 TTE: LVEF is 65 - 70%. Normal cavity size, systolic function (ejection fraction normal) and diastolic function. Mild concentric hypertrophy. Wall motion: normal. Normal left ventricular strain. Multidisciplinary Rounds Completed:  No    ABCDEF Bundle/Checklist Completed: Yes    SPECIAL EQUIPMENT: None    DISPOSITION: Stay in ICU    CRITICAL CARE CONSULTANT NOTE  I had a face to face encounter with the patient, reviewed and interpreted patient data including clinical events, labs, images, vital signs, I/O's, and examined patient. I have discussed the case and the plan and management of the patient's care with the consulting services, the bedside nurses and the respiratory therapist.      NOTE OF PERSONAL INVOLVEMENT IN CARE   This patient has a high probability of imminent, clinically significant deterioration, which requires the highest level of preparedness to intervene urgently. I participated in the decision-making and personally managed or directed the management of the following life and organ supporting interventions that required my frequent assessment to treat or prevent imminent deterioration. I personally spent 110 minutes of critical care time. This is time spent at this critically ill patient's bedside actively involved in patient care as well as the coordination of care and discussions with the patient's family.  This does not include any procedural time which has been billed separately.     Noemí Wagner DO  Staff Intensivist/Anesthesiologist  [facility]  9/4/2021

## 2021-09-04 NOTE — PROGRESS NOTES
[facility]   [address] [facility], [address] [place] [address], [facility]  Phone: [phone]   [phone]       Nephrology Progress Note  Jorge Santamaria     1975     [id number]  Date of Admission : 8/14/2021 09/04/21    CC: Follow up for NABEEL    Assessment and Plan   NABEEL :  - presumed to be 2/2 COVID illness.  -Cr continues to improve   - continue Bumex and Chlorthalidone   - daily labs    Lytes:  -Stable    COVID 19 PNA   Acute resp failure: on vent (8/25)  - Off Shalini   - on Cefepime, steroids    - received Tocilizumab and remdesivir   - per CCM          Interval History:  Good UOP, CR down, stable on Vent. Off Nimbex , remains on 100% FIO2    Review of Systems: Review of systems not obtained due to patient factors.     Current Medications:   Current Facility-Administered Medications   Medication Dose Route Frequency    diazePAM (VALIUM) tablet 20 mg  20 mg Oral Q8H    oxyCODONE IR (ROXICODONE) tablet 20 mg  20 mg Oral Q8H    bumetanide (BUMEX) injection 1 mg  1 mg IntraVENous Q12H    famotidine (PF) (PEPCID) 20 mg in 0.9% sodium chloride 10 mL injection  20 mg IntraVENous Q12H    methylPREDNISolone (PF) (SOLU-MEDROL) injection 20 mg  20 mg IntraVENous Q6H    insulin NPH (NOVOLIN N, HUMULIN N) injection 5 Units  5 Units SubCUTAneous Q12H    insulin lispro (HUMALOG) injection   SubCUTAneous Q6H    hydrALAZINE (APRESOLINE) 20 mg/mL injection 20 mg  20 mg IntraVENous Q6H PRN    chlorthalidone (HYGROTON) tablet 25 mg  25 mg Oral DAILY    dextrose (D50W) injection syrg 12.5-25 g  12.5-25 g IntraVENous PRN    white petrolatum-mineral oiL (AKWA TEARS) 83-15 % ophthalmic ointment   Both Eyes Q12H    midazolam in normal saline (VERSED) 1 mg/mL infusion  0-20 mg/hr IntraVENous TITRATE    balsam peru-castor oiL (VENELEX) ointment   Topical PRN    propofol (DIPRIVAN) 10 mg/mL infusion  0-50 mcg/kg/min IntraVENous TITRATE    fentaNYL (PF) 1,500 mcg/30 mL (50 mcg/mL) infusion  0-300 mcg/hr IntraVENous TITRATE    chlorhexidine (ORAL CARE KIT) 0.12 % mouthwash 15 mL  15 mL Oral Q12H    docusate (COLACE) 50 mg/5 mL oral liquid 100 mg  100 mg Per NG tube DAILY    dexmedeTOMidine in 0.9 % NaCl (PRECEDEX) 400 mcg/100 mL (4 mcg/mL) infusion soln  0.1-1.5 mcg/kg/hr IntraVENous TITRATE    glucose chewable tablet 16 g  4 Tablet Oral PRN    dextrose (D50W) injection syrg 12.5-25 g  12.5-25 g IntraVENous PRN    glucagon (GLUCAGEN) injection 1 mg  1 mg IntraMUSCular PRN    senna (SENOKOT) tablet 17.2 mg  2 Tablet Oral DAILY    sodium chloride (NS) flush 5-40 mL  5-40 mL IntraVENous Q8H    sodium chloride (NS) flush 5-40 mL  5-40 mL IntraVENous PRN    acetaminophen (TYLENOL) tablet 650 mg  650 mg Oral Q6H PRN    polyethylene glycol (MIRALAX) packet 17 g  17 g Oral DAILY PRN    ondansetron (ZOFRAN) injection 4 mg  4 mg IntraVENous Q6H PRN    enoxaparin (LOVENOX) injection 40 mg  40 mg SubCUTAneous Q12H      No Known Allergies    Objective:  Vitals:    Vitals:    09/04/21 0353 09/04/21 0400 09/04/21 0500 09/04/21 0612   BP:  121/84     Pulse: 62 69     Resp: 30 30     Temp:    97.1 °F (36.2 °C)   SpO2: 100% 99% 97%    Weight:       Height:         Intake and Output:  09/04 0701 - 09/04 1900  In: 316.8 [I.V.:316.8]  Out: 2800 [Urine:2800]  09/02 1901 - 09/04 0700  In: 3241.5 [I.V.:1856.5]  Out: 9038 [Urine:4090]    Physical Examination:not examined on room   Pt intubated    Yes   General: Sedated on vent   Neck:  Supple, no mass  Resp:  Symmetrical chest movements  CV:  RRR on monitor   GI:  Non distended   Neurologic:  sedated  :  Brownlee     []    High complexity decision making was performed  []    Patient is at high-risk of decompensation with multiple organ involvement    Lab Data Personally Reviewed: I have reviewed all the pertinent labs, microbiology data and radiology studies during assessment.     Recent Labs     09/04/21  0436 09/03/21  0353 09/02/21  0453    142 144  144   K 4.0 4.6 5.0 5.0    111* 112*  112*   CO2 26 26 26  27   * 188* 177*  178*   BUN 22* 84* 80*  83*   CREA 1.80* 1.89* 1.91*  1.94*   CA 8.7 8.6 8.6  8.6   MG 2.2 2.5* 2.5*   PHOS 3.0 3.5 4.0  3.9   ALB 2.2* 2.3* 2.3*  2.3*   ALT 60 67 74     Recent Labs     09/04/21  0436 09/03/21  0353 09/02/21  0453   WBC 14.6* 15.9* 16.2*   HGB 11.6* 11.5* 11.5*   HCT 35.7* 35.3* 35.9*   * 127* 126*     No results found for: SDES  Lab Results   Component Value Date/Time    Culture result: NO GROWTH 5 DAYS 08/29/2021 02:37 PM    Culture result: MRSA NOT PRESENT 08/29/2021 02:37 PM    Culture result:  08/29/2021 02:37 PM     Screening of patient nares for MRSA is for surveillance purposes and, if positive, to facilitate isolation considerations in high risk settings. It is not intended for automatic decolonization interventions per se as regimens are not sufficiently effective to warrant routine use.     Culture result: HEAVY NORMAL RESPIRATORY JESSICA 08/23/2021 07:06 AM    Culture result: NO GROWTH 5 DAYS 08/22/2021 08:51 PM     Recent Results (from the past 24 hour(s))   GLUCOSE, POC    Collection Time: 09/03/21 12:27 PM   Result Value Ref Range    Glucose (POC) 181 (H) 65 - 117 mg/dL    Performed by [address] [place] [address], POC    Collection Time: 09/03/21  5:25 PM   Result Value Ref Range    Glucose (POC) 170 (H) 65 - 117 mg/dL    Performed by Albina Ma    GLUCOSE, POC    Collection Time: 09/03/21 10:28 PM   Result Value Ref Range    Glucose (POC) 149 (H) 65 - 117 mg/dL    Performed by Woo reese RN    POC G3 - PUL    Collection Time: 09/04/21  3:59 AM   Result Value Ref Range    FIO2 (POC) 100 %    pH (POC) 7.44 7.35 - 7.45      pCO2 (POC) 37.9 35.0 - 45.0 MMHG    pO2 (POC) 115 (H) 80 - 100 MMHG    HCO3 (POC) 25.9 22 - 26 MMOL/L    sO2 (POC) 98.7 (H) 92 - 97 %    Base excess (POC) 1.8 mmol/L    Site RIGHT RADIAL      Device: ADULT VENT      Mode PRESSURE CONTROL      Set Rate 30 bpm    PEEP/CPAP (POC) 16 cmH2O    PIP (POC) 28      Allens test (POC) Positive      Inspiratory Time 1.33 sec    Specimen type (POC) ARTERIAL     MAGNESIUM    Collection Time: 09/04/21  4:36 AM   Result Value Ref Range    Magnesium 2.2 1.6 - 2.4 mg/dL   PHOSPHORUS    Collection Time: 09/04/21  4:36 AM   Result Value Ref Range    Phosphorus 3.0 2.6 - 4.7 MG/DL   PROCALCITONIN    Collection Time: 09/04/21  4:36 AM   Result Value Ref Range    Procalcitonin 0.09 ng/mL   LACTIC ACID    Collection Time: 09/04/21  4:36 AM   Result Value Ref Range    Lactic acid 2.5 (HH) 0.4 - 2.0 MMOL/L   CBC W/O DIFF    Collection Time: 09/04/21  4:36 AM   Result Value Ref Range    WBC 14.6 (H) 4.1 - 11.1 K/uL    RBC 3.89 (L) 4.10 - 5.70 M/uL    HGB 11.6 (L) 12.1 - 17.0 g/dL    HCT 35.7 (L) 36.6 - 50.3 %    MCV 91.8 80.0 - 99.0 FL    MCH 29.8 26.0 - 34.0 PG    MCHC 32.5 30.0 - 36.5 g/dL    RDW 14.5 11.5 - 14.5 %    PLATELET 086 (L) 174 - 400 K/uL    MPV 12.3 8.9 - 12.9 FL    NRBC 0.0 0  WBC    ABSOLUTE NRBC 0.00 0.00 - 0.70 K/uL   METABOLIC PANEL, COMPREHENSIVE    Collection Time: 09/04/21  4:36 AM   Result Value Ref Range    Sodium 140 136 - 145 mmol/L    Potassium 4.0 3.5 - 5.1 mmol/L    Chloride 106 97 - 108 mmol/L    CO2 26 21 - 32 mmol/L    Anion gap 8 5 - 15 mmol/L    Glucose 156 (H) 65 - 100 mg/dL    BUN 22 (H) 6 - 20 MG/DL    Creatinine 1.80 (H) 0.70 - 1.30 MG/DL    BUN/Creatinine ratio 12 12 - 20      GFR est AA 49 (L) >60 ml/min/1.73m2    GFR est non-AA 41 (L) >60 ml/min/1.73m2    Calcium 8.7 8.5 - 10.1 MG/DL    Bilirubin, total 0.5 0.2 - 1.0 MG/DL    ALT (SGPT) 60 12 - 78 U/L    AST (SGOT) 20 15 - 37 U/L    Alk. phosphatase 81 45 - 117 U/L    Protein, total 6.2 (L) 6.4 - 8.2 g/dL    Albumin 2.2 (L) 3.5 - 5.0 g/dL    Globulin 4.0 2.0 - 4.0 g/dL    A-G Ratio 0.6 (L) 1.1 - 2.2             Total time spent with patient:  xxx   min.                                Care Plan discussed with:  Patient     Family      RN      Consulting Physician /Specialist        I have reviewed the flowsheets. Chart and Pertinent Notes have been reviewed. No change in PMH ,family and social history from Consult note.       Kalina Saha MD

## 2021-09-05 NOTE — PROCEDURES
[facility]      Procedure Note - Thoracostomy/Chest Tube:   Performed by Olivia Carter MD    Emergency procedure. Immediately prior to the procedure, the patient was reevaluated and found suitable for the planned procedure and any planned medications. Immediately prior to the procedure a time out was called to verify the correct patient, procedure, equipment, staff, and marking as appropriate. Site prepped with ChloraPrep. Anesthesia was obtained with propofol, fentanyl. A mid-clavicular approach was made using seldinger technique. A 8 Fr. thoracostomy tube was inserted over a wire and secured in place with suture and tegederm. The procedure was tolerated well. Follow up chest x-ray was ordered.     Addendum: Chest tube was placed on the left side

## 2021-09-05 NOTE — PROGRESS NOTES
[facility]   [address] [facility], [address] [place] [address], [facility]  Phone: [phone]   [phone]       Nephrology Progress Note  Sharron Christy     1975     [id number]  Date of Admission : 8/14/2021 09/05/21    CC: Follow up for NABEEL    Assessment and Plan   NABEEL :  - presumed to be 2/2 COVID illness.  - Cr continues to improve   - reduced  Bumex to 1 mg daily  And continue Chlorthalidone   - daily labs    Lytes:  -Stable    COVID 19 PNA   Acute resp failure: on vent (8/25)  - Off Shalini   - on Cefepime, steroids    - received Tocilizumab and remdesivir   - per [facility]          Interval History:  Good UOP, CR down, stable on Vent. Tachy this am .     Review of Systems: Review of systems not obtained due to patient factors.     Current Medications:   Current Facility-Administered Medications   Medication Dose Route Frequency    diazePAM (VALIUM) tablet 20 mg  20 mg Oral Q8H    oxyCODONE IR (ROXICODONE) tablet 20 mg  20 mg Oral Q8H    methylPREDNISolone (PF) (SOLU-MEDROL) injection 20 mg  20 mg IntraVENous Q8H    bumetanide (BUMEX) injection 1 mg  1 mg IntraVENous Q12H    famotidine (PF) (PEPCID) 20 mg in 0.9% sodium chloride 10 mL injection  20 mg IntraVENous Q12H    insulin NPH (NOVOLIN N, HUMULIN N) injection 5 Units  5 Units SubCUTAneous Q12H    insulin lispro (HUMALOG) injection   SubCUTAneous Q6H    hydrALAZINE (APRESOLINE) 20 mg/mL injection 20 mg  20 mg IntraVENous Q6H PRN    chlorthalidone (HYGROTON) tablet 25 mg  25 mg Oral DAILY    dextrose (D50W) injection syrg 12.5-25 g  12.5-25 g IntraVENous PRN    white petrolatum-mineral oiL (AKWA TEARS) 83-15 % ophthalmic ointment   Both Eyes Q12H    midazolam in normal saline (VERSED) 1 mg/mL infusion  0-20 mg/hr IntraVENous TITRATE    balsam peru-castor oiL (VENELEX) ointment   Topical PRN    propofol (DIPRIVAN) 10 mg/mL infusion  0-50 mcg/kg/min IntraVENous TITRATE    fentaNYL (PF) 1,500 mcg/30 mL (50 mcg/mL) infusion  0-300 mcg/hr IntraVENous TITRATE    chlorhexidine (ORAL CARE KIT) 0.12 % mouthwash 15 mL  15 mL Oral Q12H    docusate (COLACE) 50 mg/5 mL oral liquid 100 mg  100 mg Per NG tube DAILY    dexmedeTOMidine in 0.9 % NaCl (PRECEDEX) 400 mcg/100 mL (4 mcg/mL) infusion soln  0.1-1.5 mcg/kg/hr IntraVENous TITRATE    glucose chewable tablet 16 g  4 Tablet Oral PRN    dextrose (D50W) injection syrg 12.5-25 g  12.5-25 g IntraVENous PRN    glucagon (GLUCAGEN) injection 1 mg  1 mg IntraMUSCular PRN    senna (SENOKOT) tablet 17.2 mg  2 Tablet Oral DAILY    sodium chloride (NS) flush 5-40 mL  5-40 mL IntraVENous Q8H    sodium chloride (NS) flush 5-40 mL  5-40 mL IntraVENous PRN    acetaminophen (TYLENOL) tablet 650 mg  650 mg Oral Q6H PRN    polyethylene glycol (MIRALAX) packet 17 g  17 g Oral DAILY PRN    ondansetron (ZOFRAN) injection 4 mg  4 mg IntraVENous Q6H PRN    enoxaparin (LOVENOX) injection 40 mg  40 mg SubCUTAneous Q12H      No Known Allergies    Objective:  Vitals:    Vitals:    09/05/21 0600 09/05/21 0700 09/05/21 0800 09/05/21 0810   BP: 118/77 (!) 151/98 90/63    Pulse: 83 (!) 120 (!) 103 (!) 119   Resp: 30 24 (!) 31 30   Temp:       SpO2:  95% 90% 91%   Weight:       Height:         Intake and Output:  No intake/output data recorded. 09/03 1901 - 09/05 0700  In: 4097.3 [I.V.:3322.3]  Out: 7590 [Urine:7500; Drains:90]    Physical Examination:not examined on room   Pt intubated    Yes   General: Sedated on vent   Neck:  Supple, no mass  Resp:  Symmetrical chest movements  CV:  RRR on monitor   GI:  Non distended   Neurologic:  sedated  :  Brownlee     []    High complexity decision making was performed  []    Patient is at high-risk of decompensation with multiple organ involvement    Lab Data Personally Reviewed: I have reviewed all the pertinent labs, microbiology data and radiology studies during assessment.     Recent Labs     09/05/21  0348 09/04/21  0436 09/03/21  0353    140 142   K 4.4 4.0 4.6    106 111*   CO2 29 26 26   * 156* 188*   BUN 73* 22* 84*   CREA 1.85* 1.80* 1.89*   CA 8.8 8.7 8.6   MG 2.2 2.2 2.5*   PHOS 3.8 3.0 3.5   ALB 2.3* 2.2* 2.3*   ALT 58 60 67     Recent Labs     09/05/21  0348 09/04/21  0436 09/03/21  0353   WBC 12.9* 14.6* 15.9*   HGB 11.7* 11.6* 11.5*   HCT 36.2* 35.7* 35.3*   * 135* 127*     No results found for: SDES  Lab Results   Component Value Date/Time    Culture result: NO GROWTH 5 DAYS 08/29/2021 02:37 PM    Culture result: MRSA NOT PRESENT 08/29/2021 02:37 PM    Culture result:  08/29/2021 02:37 PM     Screening of patient nares for MRSA is for surveillance purposes and, if positive, to facilitate isolation considerations in high risk settings. It is not intended for automatic decolonization interventions per se as regimens are not sufficiently effective to warrant routine use.     Culture result: HEAVY NORMAL RESPIRATORY JESSICA 08/23/2021 07:06 AM    Culture result: NO GROWTH 5 DAYS 08/22/2021 08:51 PM     Recent Results (from the past 24 hour(s))   GLUCOSE, POC    Collection Time: 09/04/21  1:42 PM   Result Value Ref Range    Glucose (POC) 201 (H) 65 - 117 mg/dL    Performed by [address], POC    Collection Time: 09/04/21  6:45 PM   Result Value Ref Range    Glucose (POC) 186 (H) 65 - 117 mg/dL    Performed by [address], POC    Collection Time: 09/04/21 10:41 PM   Result Value Ref Range    Glucose (POC) 190 (H) 65 - 117 mg/dL    Performed by Paola Mortimer travel RN    MAGNESIUM    Collection Time: 09/05/21  3:48 AM   Result Value Ref Range    Magnesium 2.2 1.6 - 2.4 mg/dL   PHOSPHORUS    Collection Time: 09/05/21  3:48 AM   Result Value Ref Range    Phosphorus 3.8 2.6 - 4.7 MG/DL   PROCALCITONIN    Collection Time: 09/05/21  3:48 AM   Result Value Ref Range    Procalcitonin 0.36 ng/mL   LACTIC ACID    Collection Time: 09/05/21  3:48 AM   Result Value Ref Range    Lactic acid 2.5 (HH) 0.4 - 2.0 MMOL/L   CBC W/O DIFF    Collection Time: 09/05/21  3:48 AM   Result Value Ref Range    WBC 12.9 (H) 4.1 - 11.1 K/uL    RBC 4.00 (L) 4.10 - 5.70 M/uL    HGB 11.7 (L) 12.1 - 17.0 g/dL    HCT 36.2 (L) 36.6 - 50.3 %    MCV 90.5 80.0 - 99.0 FL    MCH 29.3 26.0 - 34.0 PG    MCHC 32.3 30.0 - 36.5 g/dL    RDW 14.4 11.5 - 14.5 %    PLATELET 119 (L) 645 - 400 K/uL    MPV 12.1 8.9 - 12.9 FL    NRBC 0.0 0  WBC    ABSOLUTE NRBC 0.00 0.00 - 3.92 K/uL   METABOLIC PANEL, COMPREHENSIVE    Collection Time: 09/05/21  3:48 AM   Result Value Ref Range    Sodium 138 136 - 145 mmol/L    Potassium 4.4 3.5 - 5.1 mmol/L    Chloride 102 97 - 108 mmol/L    CO2 29 21 - 32 mmol/L    Anion gap 7 5 - 15 mmol/L    Glucose 212 (H) 65 - 100 mg/dL    BUN 73 (H) 6 - 20 MG/DL    Creatinine 1.85 (H) 0.70 - 1.30 MG/DL    BUN/Creatinine ratio 39 (H) 12 - 20      GFR est AA 48 (L) >60 ml/min/1.73m2    GFR est non-AA 39 (L) >60 ml/min/1.73m2    Calcium 8.8 8.5 - 10.1 MG/DL    Bilirubin, total 0.5 0.2 - 1.0 MG/DL    ALT (SGPT) 58 12 - 78 U/L    AST (SGOT) 23 15 - 37 U/L    Alk.  phosphatase 88 45 - 117 U/L    Protein, total 6.4 6.4 - 8.2 g/dL    Albumin 2.3 (L) 3.5 - 5.0 g/dL    Globulin 4.1 (H) 2.0 - 4.0 g/dL    A-G Ratio 0.6 (L) 1.1 - 2.2     TRIGLYCERIDE    Collection Time: 09/05/21  3:48 AM   Result Value Ref Range    Triglyceride 251 (H) <150 MG/DL   POC G3 - PUL    Collection Time: 09/05/21  5:27 AM   Result Value Ref Range    FIO2 (POC) 100 %    pH (POC) 7.41 7.35 - 7.45      pCO2 (POC) 40.8 35.0 - 45.0 MMHG    pO2 (POC) 76 (L) 80 - 100 MMHG    HCO3 (POC) 25.9 22 - 26 MMOL/L    sO2 (POC) 95.1 92 - 97 %    Base excess (POC) 1.2 mmol/L    Site RIGHT RADIAL      Device: ADULT VENT      Mode PRESSURE CONTROL      Set Rate 30 bpm    PEEP/CPAP (POC) 14 cmH2O    PIP (POC) 28      Allens test (POC) Positive      Inspiratory Time 1.33 sec    Specimen type (POC) ARTERIAL     GLUCOSE, POC    Collection Time: 09/05/21  7:07 AM   Result Value Ref Range    Glucose (POC) 192 (H) 65 - 117 mg/dL Performed by BRENDA BEARD            Total time spent with patient:  xxx   min. Care Plan discussed with:  Patient     Family      RN      Consulting Physician [facility],         I have reviewed the flowsheets. Chart and Pertinent Notes have been reviewed. No change in PMH ,family and social history from Consult note.       aSrkis Villalta MD

## 2021-09-05 NOTE — PROGRESS NOTES
0730: Bedside, Verbal and Written shift change report given to Mariela Warren, RN (oncoming nurse) by Latisha Bennett RN (offgoing nurse). Report included the following information SBAR, Kardex, Intake/Output, MAR, Recent Results and Cardiac Rhythm NSR.     0740: Pt Tachy in the 120's. O2 Sats in 50's. Pt had just been bathed. RT paged and sedation adjusted. 1110: Radiology called and notified this RN that patient's CXR showed a moderate left pneumo. Arin Mc, NP notified. 1200: Dr. Ani Jo at bedside to place a Chest tube. 1220: CXR completed. Pneumo increased. Dr. Ani Jo notified. 1243:  At bedside to place large bore chest tube. 1500 Pt's temp increasing. Med's given see MAR.     1600: Pt's temp continues to increase. Dr. Ani Jo notified. Ice packs in place. 1830: Pt's temp continues to increase. Ice packs replaced and MD notified. 1930: Bedside, Verbal and Written shift change report given to Makenna Alas, TONA (oncoming nurse) by Mariela Warren, RN (offgoing nurse). Report included the following information SBAR, Kardex, Intake/Output, MAR, Recent Results and Cardiac Rhythm NSR.

## 2021-09-05 NOTE — PROGRESS NOTES
[facility]    ICU TEAM Progress Note    Name: Godfrey Lin   : 1975   MRN: [id number]   Date: 2021      Assessment:   Reason for ICU Admission: Acute hypoxic respiratory failure secondary to COVID-19 Pneumonia     Brief HPI: 50 yo male who presented to the ED on  with no prior PMH with SOB x2 days. [facility] had developed body aches and fever on . [facility] was diagnosed with COVID-19 pneumonia and was started on dexamethasone, remdesivir -, and tocilizumab . He was admitted to the ICU on 8/15 for worsening hypoxia and required BiPAP.  He remained in the ICU from 8/15- on BiPAP.  He weaned from 100% to 80%  and was able to get himself from bed to chair. He was transferred to the [facility] on . FIO2 further decreased to 70% on . Hi Flow was attempted , but he quickly desaturated to 72%.  He was placed back on BiPAP at 80% FIO2 (IPAP/EPAP not listed).  CXR on  noted to have increased infiltrates. [facility] continued to decompensate and required intubation in the early morning hours of . He has had difficulty weaning from the vent, and this morning he remains on aggressive vent settings with marginal oxygenation. Plan:     Neuro: Versed, fentanyl, precedex, propofol; oxy/valium enterally; Has had difficulty interacting with vent, and this morning is desatting into 60s-70s, so will restart Nimbex.  -plan SAT once FiO2 is 0.50  Pulm:   - Intubated on . On aggressive vent settings (PC , fiO2 100%) and continues to desat. - Shalini off  - Restarting Nimbex as above  - Continue methylpred taper  - Continue Bumex and chlorthalidone  - DVT scan NEG & has received Toci  Cardiac: Remains HDS  Renal: Hyperkalemia - resolved. Creatinine improving, good urine output. Appreciate Neph  GI: Nepro TFs given prior hyperkalemia. Senna, colace. ID: Antibiotics off. Procal nL.    Heme: Lovenox  Endo: Cont methylpred taper  MSK:  ROM    F - Feeding:  Yes   A - Analgesia: Fentanyl  S - Sedation: Propofol, Precedex and Versed  T - DVT Prophylaxis: SCD's or Sequential Compression Device and Lovenox   C - Code Status: Full Code  H - Head of Bed: > 30 Degrees  U - Ulcer Prophylaxis: Pepcid (famotidine)   G - Glycemic Control: Insulin  S - Spontaneous Breathing Trial: No  B - Bowel Regimen: Senna  I - Indwelling Catheter:   Tubes: ETT and Orogastric Tube  Lines: Peripheral IV and Central Line  Drains: Brownlee Catheter  D - De-escalation of Antibiotics: cefepime    Subjective:   Overnight Events:  : Nimbex restarted   : Sedated, FiO2 at 1.0  9/3: reparalyzed, FiO2 at 1.0  : oxygenation improving  : off paralytic  : Shalini weaned off. HDS.   : Improvement in oxygenation. Shalini wean ongoing.   : hypoxia and worsened hypercapnia overnight. Placed on bilevel. : Hyperkalemia marginally better with lokema. Liver enzymes improving.   : Patient condition worsened when he was placed in prone position. Led to significant hypoxia. Improved with positioning him back in supine position and adjusting mechanical ventilation. Remained heavily sedated and paralyzed  : Remains tenuous, no improvement in oxygen requirement or ventilatory support.  Requiring higher doses for sedation  : Intubated early morning on the .  Sedated.     Objective:     Visit Vitals  BP 90/63   Pulse (!) 119   Temp 97.5 °F (36.4 °C)   Resp 30   Ht 5' 10\" (1.778 m)   Wt 116.4 kg (256 lb 9.9 oz)   SpO2 91%   BMI 36.82 kg/m²    O2 Flow Rate (L/min): 60 l/min O2 Device: Endotracheal tube Temp (24hrs), Av.6 °F (36.4 °C), Min:97.5 °F (36.4 °C), Max:97.8 °F (36.6 °C)     Ventilator Settings:  Mode Rate Tidal Volume Pressure FiO2 PEEP   Assist control, Pressure control   400 ml    100 % 14 cm H20     Peak airway pressure: 43 cm H2O    Minute ventilation: 18.1 l/min        Intake/Output:     Intake/Output Summary (Last 24 hours) at 2021 0913  Last data filed at 2021 0700  Gross per 24 hour   Intake 2668.69 ml   Output 4490 ml   Net -1821.31 ml     Physical Exam:  General:   Sedated intubated on mechanical ventilation   Eyes:  Sclera anicteric. Pupils equally round and reactive to light. Mouth/Throat: Mucous membranes normal, oral pharynx clear, ET tube, OG tube   Neck: Supple   Lungs:    Coarse, distant   CV:  Regular rate and rhythm   Abdomen:   Soft, non-tender. bowel sounds normal. non-distended   Lines/Devices:  Intact, no erythema, drainage or tenderness   Psych:  Deeply sedated, withdraw to painful stimuli     24h Labs & Data: Reviewed    Medications: Reviewed    8/21 TTE: LVEF is 65 - 70%. Normal cavity size, systolic function (ejection fraction normal) and diastolic function. Mild concentric hypertrophy. Wall motion: normal. Normal left ventricular strain. Multidisciplinary Rounds Completed:  No    ABCDEF Bundle/Checklist Completed: Yes    SPECIAL EQUIPMENT: None    DISPOSITION: Stay in ICU    CRITICAL CARE CONSULTANT NOTE  I had a face to face encounter with the patient, reviewed and interpreted patient data including clinical events, labs, images, vital signs, I/O's, and examined patient. I have discussed the case and the plan and management of the patient's care with the consulting services, the bedside nurses and the respiratory therapist.      NOTE OF PERSONAL INVOLVEMENT IN CARE   This patient has a high probability of imminent, clinically significant deterioration, which requires the highest level of preparedness to intervene urgently. I participated in the decision-making and personally managed or directed the management of the following life and organ supporting interventions that required my frequent assessment to treat or prevent imminent deterioration. I personally spent 45 minutes of critical care time.  This is time spent at this critically ill patient's bedside actively involved in patient care as well as the coordination of care and discussions with the patient's family. This does not include any procedural time which has been billed separately. Christian Hernandez MD  Staff Intensivist/Anesthesiologist  [facility]  9/5/2021        Addendum:  The patient has done very poorly today. He developed a left sided pneumothorax and required placement of 2 chest tubes, but after re-expansion of his lung he remains severely hypoxemic and hypercarbic, and hemodynamically unstable. His prognosis is very poor, and his girlfriend, Shanta Minor, has been informed about the events of the day and his poor prognosis. He remains a full code at this time.

## 2021-09-05 NOTE — PROCEDURES
[facility]      Procedure Note - Thoracostomy/Chest Tube:   Performed by Adi Wang MD .      Obtained emergent consent. Immediately prior to the procedure, the patient was reevaluated and found suitable for the planned procedure and any planned medications. Immediately prior to the procedure a time out was called to verify the correct patient, procedure, equipment, staff, and marking as appropriate. Site prepped with ChloraPrep. A mid-axillary incision was made in the left chest and extended down to the intercostal muscles. A jose clamp was used to penetrate the chest wall and parietal pleura superior to rib. A finger was inserted and confirmed that the lung was free from the chest wall. A 28 Fr. thoracostomy tube was inserted without trochar and secured in place in the usual manner. The procedure was tolerated well. Oxygen saturation beginning to improve following thoracostomy. Follow up chest x-ray was ordered.

## 2021-09-06 NOTE — PROGRESS NOTES
[facility]   [address] [facility], [address] [place] [address], [facility]  Phone: [phone]   [phone]       Nephrology Progress Note  Dane Spicer     1975     [id number]  Date of Admission : 8/14/2021 09/06/21    CC: Follow up for NABEEL    Assessment and Plan   NABEEL :  - presumed to be 2/2 COVID illness.  -Labs are currently pending but expect creatinine to rise due to hypotension and IV volume depletion  -Stopped Bumex and chlorthalidone.  -Ordered IV albumin 25 g every 6H x6 doses  -Keep MAP greater than 65  -Avoid all potential nephrotoxic agents  - daily labs    Lytes:  -Is pending    COVID 19 PNA   Acute resp failure: on vent (8/25)  - Off Sahlini   - on Cefepime, steroids    - received Tocilizumab and remdesivir   -S/p left CT x2 on 9/5/2021  - per [facility]          Interval History:  Events noted. Had emergent chest tube placement yesterday x2. Had sustained hypotension. His urine output has dropped significantly. He is back on Nimbex. His a.m. labs are currently pending. Review of Systems: Review of systems not obtained due to patient factors.     Current Medications:   Current Facility-Administered Medications   Medication Dose Route Frequency    albumin human 25% (BUMINATE) solution 25 g  25 g IntraVENous Q6H    cisatracurium (NIMBEX) 2 mg/mL IV infusion (UNDILUTED)  0-10 mcg/kg/min IntraVENous TITRATE    methylPREDNISolone (PF) (SOLU-MEDROL) injection 20 mg  20 mg IntraVENous DAILY    sodium bicarbonate 150 mEq/1000 mL D5W (premix)   IntraVENous CONTINUOUS    vasopressin (VASOSTRICT) 20 Units in 0.9% sodium chloride 100 mL infusion  0-0.04 Units/min IntraVENous TITRATE    NOREPINephrine (LEVOPHED) 32,000 mcg in dextrose 5% 250 mL (128 mcg/mL) infusion  1-100 mcg/min IntraVENous TITRATE    diazePAM (VALIUM) tablet 20 mg  20 mg Oral Q8H    oxyCODONE IR (ROXICODONE) tablet 20 mg  20 mg Oral Q8H    famotidine (PF) (PEPCID) 20 mg in 0.9% sodium chloride 10 mL injection  20 mg IntraVENous Q12H    insulin NPH (NOVOLIN N, HUMULIN N) injection 5 Units  5 Units SubCUTAneous Q12H    insulin lispro (HUMALOG) injection   SubCUTAneous Q6H    hydrALAZINE (APRESOLINE) 20 mg/mL injection 20 mg  20 mg IntraVENous Q6H PRN    dextrose (D50W) injection syrg 12.5-25 g  12.5-25 g IntraVENous PRN    white petrolatum-mineral oiL (AKWA TEARS) 83-15 % ophthalmic ointment   Both Eyes Q12H    midazolam in normal saline (VERSED) 1 mg/mL infusion  0-20 mg/hr IntraVENous TITRATE    balsam peru-castor oiL (VENELEX) ointment   Topical PRN    propofol (DIPRIVAN) 10 mg/mL infusion  0-50 mcg/kg/min IntraVENous TITRATE    fentaNYL (PF) 1,500 mcg/30 mL (50 mcg/mL) infusion  0-300 mcg/hr IntraVENous TITRATE    chlorhexidine (ORAL CARE KIT) 0.12 % mouthwash 15 mL  15 mL Oral Q12H    docusate (COLACE) 50 mg/5 mL oral liquid 100 mg  100 mg Per NG tube DAILY    dexmedeTOMidine in 0.9 % NaCl (PRECEDEX) 400 mcg/100 mL (4 mcg/mL) infusion soln  0.1-1.5 mcg/kg/hr IntraVENous TITRATE    glucose chewable tablet 16 g  4 Tablet Oral PRN    dextrose (D50W) injection syrg 12.5-25 g  12.5-25 g IntraVENous PRN    glucagon (GLUCAGEN) injection 1 mg  1 mg IntraMUSCular PRN    senna (SENOKOT) tablet 17.2 mg  2 Tablet Oral DAILY    sodium chloride (NS) flush 5-40 mL  5-40 mL IntraVENous Q8H    sodium chloride (NS) flush 5-40 mL  5-40 mL IntraVENous PRN    acetaminophen (TYLENOL) tablet 650 mg  650 mg Oral Q6H PRN    polyethylene glycol (MIRALAX) packet 17 g  17 g Oral DAILY PRN    ondansetron (ZOFRAN) injection 4 mg  4 mg IntraVENous Q6H PRN    enoxaparin (LOVENOX) injection 40 mg  40 mg SubCUTAneous Q12H      No Known Allergies    Objective:  Vitals:    Vitals:    09/06/21 0452 09/06/21 0500 09/06/21 0600 09/06/21 0700   BP:  126/75 135/82 122/70   Pulse: 85 85 84 84   Resp: 30 30 30 30   Temp:       SpO2: 93% 93% 92% 94%   Weight:       Height:         Intake and Output:  No intake/output data recorded.   09/04 1901 - 09/06 0700  In: 6757.7 [I.V.:5567.7]  Out: 4409 [Urine:4025; Drains:150]    Physical Examination:not examined on room   Pt intubated    Yes   General: Sedated on vent   Neck:  Supple, no mass  Resp:  Symmetrical chest movements  CV:  RRR on monitor   GI:  Non distended   Neurologic:  sedated  :  Brownlee     []    High complexity decision making was performed  []    Patient is at high-risk of decompensation with multiple organ involvement    Lab Data Personally Reviewed: I have reviewed all the pertinent labs, microbiology data and radiology studies during assessment. Recent Labs     09/05/21 0348 09/04/21  0436    140   K 4.4 4.0    106   CO2 29 26   * 156*   BUN 73* 22*   CREA 1.85* 1.80*   CA 8.8 8.7   MG 2.2 2.2   PHOS 3.8 3.0   ALB 2.3* 2.2*   ALT 58 60     Recent Labs     09/05/21 0348 09/04/21  0436   WBC 12.9* 14.6*   HGB 11.7* 11.6*   HCT 36.2* 35.7*   * 135*     No results found for: SDES  Lab Results   Component Value Date/Time    Culture result: NO GROWTH 5 DAYS 08/29/2021 02:37 PM    Culture result: MRSA NOT PRESENT 08/29/2021 02:37 PM    Culture result:  08/29/2021 02:37 PM     Screening of patient nares for MRSA is for surveillance purposes and, if positive, to facilitate isolation considerations in high risk settings. It is not intended for automatic decolonization interventions per se as regimens are not sufficiently effective to warrant routine use.     Culture result: HEAVY NORMAL RESPIRATORY JESSICA 08/23/2021 07:06 AM    Culture result: NO GROWTH 5 DAYS 08/22/2021 08:51 PM     Recent Results (from the past 24 hour(s))   GLUCOSE, POC    Collection Time: 09/05/21 12:42 PM   Result Value Ref Range    Glucose (POC) 204 (H) 65 - 117 mg/dL    Performed by Anabella Juarez    BLOOD GAS, ARTERIAL    Collection Time: 09/05/21  4:27 PM   Result Value Ref Range    pH 7.03 (LL) 7.35 - 7.45      PCO2 94 (H) 35 - 45 mmHg    PO2 51 (L) 80 - 100 mmHg    O2 SAT 67 (L) 92 - 97 % BICARBONATE 24 22 - 26 mmol/L    BASE DEFICIT 9.0 mmol/L    O2 METHOD VENT      FIO2 100 %    MODE ASSIST CONTROL      SET RATE 30      PEEP/CPAP 14.0      Sample source ARTERIAL      SITE LEFT BRACHIAL      RANDALL'S TEST YES      Critical value read back Called to CRIS VEGA on 09/05/2021 at 16:27    GLUCOSE, POC    Collection Time: 09/05/21  6:15 PM   Result Value Ref Range    Glucose (POC) 283 (H) 65 - 117 mg/dL    Performed by Fiordaliza Weldon, POC    Collection Time: 09/05/21  8:42 PM   Result Value Ref Range    Glucose (POC) 237 (H) 65 - 117 mg/dL    Performed by García Cristina, POC    Collection Time: 09/05/21 11:14 PM   Result Value Ref Range    Glucose (POC) 193 (H) 65 - 117 mg/dL    Performed by [employer]    POC G3 - PUL    Collection Time: 09/06/21  4:58 AM   Result Value Ref Range    FIO2 (POC) 100 %    pH (POC) 7.35 7.35 - 7.45      pCO2 (POC) 51.0 (H) 35.0 - 45.0 MMHG    pO2 (POC) 58 (L) 80 - 100 MMHG    HCO3 (POC) 28.4 (H) 22 - 26 MMOL/L    sO2 (POC) 87.7 (L) 92 - 97 %    Base excess (POC) 2.1 mmol/L    Site RIGHT RADIAL      Device: ADULT VENT      Mode PRESSURE CONTROL      Set Rate 30 bpm    PEEP/CPAP (POC) 14 cmH2O    PIP (POC) 24      Allens test (POC) Positive      Inspiratory Time 1.33 sec    Specimen type (POC) ARTERIAL     LACTIC ACID    Collection Time: 09/06/21  6:15 AM   Result Value Ref Range    Lactic acid 1.4 0.4 - 2.0 MMOL/L   GLUCOSE, POC    Collection Time: 09/06/21  6:17 AM   Result Value Ref Range    Glucose (POC) >600 (HH) 65 - 117 mg/dL    Performed by [employer]    GLUCOSE, POC    Collection Time: 09/06/21  6:20 AM   Result Value Ref Range    Glucose (POC) 220 (H) 65 - 117 mg/dL    Performed by [employer]            Total time spent with patient:  xxx   min. Care Plan discussed with:  Patient     Family      RN      Consulting Physician [facility],         I have reviewed the flowsheets.   Chart and Pertinent Notes have been reviewed. No change in PMH ,family and social history from Consult note.       Krunal Cueva MD

## 2021-09-06 NOTE — PROGRESS NOTES
[facility]    ICU TEAM Progress Note    Name: Brigido Anguiano   : 1975   MRN: [id number]   Date: 2021      Assessment:   Reason for ICU Admission: Acute hypoxic respiratory failure secondary to COVID-19 Pneumonia     Brief HPI: 52 yo male who presented to the ED on  with no prior PMH with SOB x2 days. [facility] had developed body aches and fever on . [facility] was diagnosed with COVID-19 pneumonia and was started on dexamethasone, remdesivir -, and tocilizumab . He was admitted to the ICU on 8/15 for worsening hypoxia and required BiPAP.  He remained in the ICU from 8/15- on BiPAP.  He weaned from 100% to 80%  and was able to get himself from bed to chair. He was transferred to the [facility] on . He had worsening oxygenation while in the [facility] and was transferred back to the ICU on . [facility] continued to decompensate and required intubation in the early morning hours of . He has remained intubated since, and on  he developed a spontaneous pneumothorax of the left side and required emergent placement of 2 chest tubes. That event led to further clinical decompensation, and this morning he is on aggressive vent settings (PEEP 14, fiO2 100%), paralytics, Shalini, and pressors. Plan:     Neuro:  -Currently being paralyzed with Nimbex, and sedated with high doses of propofol, fentanyl, Precedex, Versed, oral Valium, and oral oxy IR.  -Will d/c Precedex, Valium, and oxy IR, and decrease fentanyl dose to 150. Continue paralysis. Pulm:   - Intubated on . - Spontaneous ptx on the L on  requiring emergent placement of 2 chest tubes. Large bore chest tube with continuous air leak this morning.  - ABG much improved this morning.  - Remains on aggressive vent settings (PEEP 14, fiO2 100%) and Shalini with sats in low 90s.   - Continue methylpred taper  - DVT scan NEG & has received Toci  Cardiac:   - Became HD unstable after development of pneumothorax on   - On Levo and vaso this morning. Wean for goal MAP of >65  Renal:   - Developed severe respiratory acidosis on 9/5 and bicarb drip was started to help compensate. PH much better this morning. Will d/c bicarb. - Nephro following. GI: Nepro TFs given prior hyperkalemia. Senna, colace. ID: Antibiotics off. Procal nL. Heme: Lovenox  Endo: Cont methylpred taper  MSK:  ROM    F - Feeding:  Yes   A - Analgesia: Fentanyl  S - Sedation: Propofol, Versed  T - DVT Prophylaxis: SCD's or Sequential Compression Device and Lovenox   C - Code Status: Full Code  H - Head of Bed: > 30 Degrees  U - Ulcer Prophylaxis: Pepcid (famotidine)   G - Glycemic Control: Insulin  S - Spontaneous Breathing Trial: No  B - Bowel Regimen: Senna  I - Indwelling Catheter:   Tubes: ETT and Orogastric Tube  Lines: Peripheral IV and Central Line  Drains: Brownlee Catheter  D - De-escalation of Antibiotics: None    Subjective:   Overnight Events:  9/6: Stabilized, but now reparalyzed and on pressors   9/5: Spontaneous ptx on the L, significant clinical decompensation  9/4: Sedated, FiO2 at 1.0  9/3: reparalyzed, FiO2 at 1.0  9/2: oxygenation improving  9/1: off paralytic  8/31: Shalini weaned off. HDS.   8/30: Improvement in oxygenation. Shalini wean ongoing.   8/29: hypoxia and worsened hypercapnia overnight. Placed on bilevel. 8/28: Hyperkalemia marginally better with lokema. Liver enzymes improving.   8/27: Patient condition worsened when he was placed in prone position. Led to significant hypoxia. Improved with positioning him back in supine position and adjusting mechanical ventilation. Remained heavily sedated and paralyzed  8/26: Remains tenuous, no improvement in oxygen requirement or ventilatory support.  Requiring higher doses for sedation  8/25: Intubated early morning on the 25th.  Sedated.     Objective:     Visit Vitals  BP 96/61   Pulse 76   Temp 99.5 °F (37.5 °C)   Resp 30   Ht 5' 10\" (1.778 m)   Wt 109.1 kg (240 lb 8.4 oz)   SpO2 94%   BMI 34.51 kg/m²    O2 Flow Rate (L/min): 60 l/min O2 Device: Endotracheal tube, Ventilator Temp (24hrs), Av °F (38.3 °C), Min:97.8 °F (36.6 °C), Max:103.3 °F (39.6 °C)     Ventilator Settings:  Mode Rate Tidal Volume Pressure FiO2 PEEP   Assist control, Pressure control   400 ml    100 % 14 cm H20     Peak airway pressure: 35 cm H2O    Minute ventilation: 12 l/min        Intake/Output:     Intake/Output Summary (Last 24 hours) at 2021 0824  Last data filed at 2021 0700  Gross per 24 hour   Intake 4542.59 ml   Output 1359 ml   Net 3183.59 ml     Physical Exam:  General:   Sedated intubated on mechanical ventilation   Eyes:  Sclera anicteric. Pupils equally round and reactive to light. Mouth/Throat: Mucous membranes normal, oral pharynx clear, ET tube, OG tube   Neck: Supple   Lungs:    Coarse, distant   CV:  Regular rate and rhythm   Abdomen:   Soft, non-tender. bowel sounds normal. non-distended   Lines/Devices:  Intact, no erythema, drainage or tenderness   Psych:  Deeply sedated, withdraw to painful stimuli     24h Labs & Data: Reviewed    Medications: Reviewed     TTE: LVEF is 65 - 70%. Normal cavity size, systolic function (ejection fraction normal) and diastolic function. Mild concentric hypertrophy. Wall motion: normal. Normal left ventricular strain. Multidisciplinary Rounds Completed:  No    ABCDEF Bundle/Checklist Completed: Yes    SPECIAL EQUIPMENT: None    DISPOSITION: Stay in ICU    CRITICAL CARE CONSULTANT NOTE  I had a face to face encounter with the patient, reviewed and interpreted patient data including clinical events, labs, images, vital signs, I/O's, and examined patient.   I have discussed the case and the plan and management of the patient's care with the consulting services, the bedside nurses and the respiratory therapist.      NOTE OF PERSONAL INVOLVEMENT IN CARE   This patient has a high probability of imminent, clinically significant deterioration, which requires the highest level of preparedness to intervene urgently. I participated in the decision-making and personally managed or directed the management of the following life and organ supporting interventions that required my frequent assessment to treat or prevent imminent deterioration. I personally spent 45 minutes of critical care time. This is time spent at this critically ill patient's bedside actively involved in patient care as well as the coordination of care and discussions with the patient's family. This does not include any procedural time which has been billed separately.     Rachel Das MD  Staff Intensivist/Anesthesiologist  [facility]  9/6/2021

## 2021-09-06 NOTE — PROGRESS NOTES
1500: Pt's belongings inventoried and documented on Valuables Recording Form- 1 black wall plug, 1 black phone  cord, 1 black android cell phone with black case and monkey sticker, 1 PICC line patient information packet, 3 copies of advanced directives paperwork. 1545: Pt's belongings returned to [facility], significant other and POA, belonging reviewed against valuables recording form, Valerie Alejandro signed for pt's belongings, copy of recording form given to Valerie Alejandro, one copy retained for file.

## 2021-09-06 NOTE — DISCHARGE SUMMARY
The patient was a 52 yo male with no prior PMH who presented to the ED on 8/14 with SOB x2 days. [facility] had developed body aches and fever on 8/8. [facility] was diagnosed with COVID-19 pneumonia and was started on dexamethasone, remdesivir 8/14-8/19, and tocilizumab 8/14. He was admitted to the ICU on 8/15 for worsening hypoxia and required BiPAP.  He remained in the ICU from 8/15-8/18 on BiPAP. He weaned from 100% to 80% 16/8 and was able to get himself from bed to chair. He was transferred to the [facility] on 8/18. He had worsening oxygenation while in the [facility] and was transferred back to the ICU on 8/22. [facility] continued to decompensate and required intubation in the early morning hours of 8/25. He remained intubated afterwards and had ongoing high O2 and pressure requirements to maintain adequate sats. Then, on 9/5 he developed a spontaneous pneumothorax of the left side and required emergent placement of 2 chest tubes. That event led to further clinical decompensation as afterwards he developed new renal failure, acidemia, and shock, and required pressors, bicarb infusion, and paralysis. On 9/6 he developed severe, refractory hypoxemia and eventually coded. We were unable to achieve stable ROSC, and he was pronounced dead.     Time of death: 12:32  Cause of death: COVID-19 pneumonia

## 2021-09-06 NOTE — PROGRESS NOTES
0730: Bedside and Verbal shift change report given to [facility] RN (oncoming nurse) by Page Brito (offgoing nurse). Report included the following information SBAR, Kardex, Intake/Output, MAR, Recent Results, Cardiac Rhythm NSR and Alarm Parameters . 0815: Received orders from Tc Sweet MD to stop the bicarb, stop the precedex, and titrate the fentanyl to 150 mcg. MD at bedside changing vent setting.     8710: Precedex stopped per MD order. 1030: Fentanyl decreased to 250 mcg, per MD order     1100: Pt desating in the low 80's, MD notified. No orders received. 1108: Fentanyl increased to 300, MD notified. O2 saturations in  Mid 70's, MD notified. 1112: Pt sats continuing to drop. MD aware. 1120: Notified MD of pt sats in the 50's. Orders received to restart Precedex. 1130: Levo paused for SBP in the 180's.    1132: Precedex restarted at 1.5 mcg/kg/hr. 1135: MD at bedside to evaluate. Sats in the low 30's, pt tachycardic and hypertensive. 1140: Vaso paused, SBP in the 180's. RT at bedside to draw an ABG. 1155: O2 sats in the 30's. Pauline Early MD notified. 1158: Vaso restarted BP 84/47.    1202: Levo restarted @ 5 mcg/min. 1210: Levo maxed at 100 mcg/min BP 49/39 (39) MD notified. HR at 54, MD notified and at bedside. 1221: Pt HR 30 on monitor without pulse. Pt PEA, code blue called. Compressions started. 1222: 1 mg of Epi administered. 1224: 1 amp of bicarb administered. Sedation and Nimbex stopped. 1226: Compressions stopped, pulse check. ROSC achieved. MD at bedside. 1232: Pt PEA and then asystole on monitor. MD determined resuscitation efforts futile.  Pt pronounced by cT Sweet MD.

## 2021-09-06 NOTE — PROGRESS NOTES
responded to Code Blue in ICU room 4247. Staff with patient providing care. No family or visitors. Patient is under Covid-19 isolation precautions. Patient did not survive. Staff will contact the patient's family and will contact the  should family arrive and desire pastoral and spiritual care. Please contact spiritual care for further referral or consult. Rev.  La Dean MDiv, [facility], [facility]   paging service: [phone])

## 2021-09-06 NOTE — PROGRESS NOTES
1930: Bedside shift change report given to [address] [place] [address] (oncoming nurse) by Carlos Russell RN (offgoing nurse). Report included the following information SBAR, Kardex, Intake/Output, MAR, Recent Results, Cardiac Rhythm sinus tach and Alarm Parameters . 1945: Called RT to initiate NO therapy per Raoul NP, CT suction set to -40, TF held. 0400: VS significantly improved. Chest tubes intact. Intensivist aware of 450mL gastric residual - continue to hold TF.     0730: Bedside shift change report given to [address] [place] [address] and Pradeep Joe RN (oncoming nurse) by [address] [place] [address] (offgoing nurse). Report included the following information SBAR, Kardex, Intake/Output, MAR, Recent Results, Cardiac Rhythm NSR and Alarm Parameters .

## 2021-09-06 NOTE — DEATH NOTE
The patient  this afternoon. He had a significant clinical decompensation yesterday after developing a spontaneous pneumothorax. He seemed to stabilize overnight after aggressive medical intervention, but this morning he had new renal failure, and late this morning he became profoundly hypoxemic. CXR showed severe bilateral interstitial infiltrates and fully expanded lungs. He did not respond to aggressive ventilator management including APRV, paralysis, and Shalini. He then coded, and we were not able to achieve stable ROSC. Following his code he was unresponsive and was not making any respiratory efforts. There were no audible heart or lung sounds, and no palpable pulses. Cardiac monitor showed asystole. The patient was pronounced dead.     Time of death: 12:32  Cause of death: COVID-19 pneumonia

## 2021-09-08 LAB
GLUCOSE BLD STRIP.AUTO-MCNC: NORMAL MG/DL (ref 65–117)
SERVICE CMNT-IMP: NORMAL

## 2021-09-21 NOTE — ADT AUTH CERT NOTES
Viral Illness, Acute - Care Day 23 (9/5/2021) by Mike Lindsay       Review Status Review Entered   Completed 9/13/2021 13:13      Criteria Review      Care Day: 23 Care Date: 9/5/2021 Level of Care: ICU    Guideline Day 3    Clinical Status    ( ) * Hemodynamic stability    9/13/2021 13:13:58 EDT by Mkie Lindsay      , 160, 152, BP 74/41, 88/57, 74/31    ( ) * Afebrile or temperature acceptable for next level of care    9/13/2021 13:13:58 EDT by Mike Lindsay      Temp 103.3    ( ) * Tachypnea absent    9/13/2021 13:13:58 EDT by Mike Lindsay      RR 31, 30, 30    ( ) * Hypoxemia absent    9/13/2021 13:13:58 EDT by Hemanth Glass 89% on vent/ETT, 83% on vent/ETT, 88% on vent/ETT    ( ) * Mental status at baseline    9/13/2021 13:13:58 EDT by Mike Lindsay      sedated on vent    ( ) * Renal function at baseline, or stable and acceptable for next level of care    9/13/2021 13:13:58 EDT by Mike Lindsay      BUN: 73 (H)  Creatinine: 1.85 (H)    ( ) * Discharge plans and education understood    Activity    ( ) * Ambulatory or acceptable for next level of care    9/13/2021 13:13:58 EDT by Mike Lindsay      sedated on vent    Routes    ( ) * Oral hydration    9/13/2021 13:13:58 EDT by Mike Lindsay      adult tube feedings    ( ) * Oral medications or regimen acceptable for next level of care    9/13/2021 13:13:58 EDT by Mike Lindsay      sedated on vent    ( ) * Oral diet or acceptable for next level of care    9/13/2021 13:13:58 EDT by Mike Lindsay      adult tube feedings    Interventions    ( ) * Isolation not indicated, or is performable at next level of care    9/13/2021 13:13:58 EDT by Mike Lindsay      droplet plus isolation    (X) Pulse oximetry    9/13/2021 13:13:58 EDT by Mike Lindsay      89% on vent/ETT, 83% on vent/ETT, 88% on vent/ETT    Medications    ( ) * Antimicrobial medication absent or regimen established for next level of care    (X) Possible DVT prophylaxis    9/13/2021 13:13:58 EDT by Bryan Ibrahim      Lovenox 40mg SC q12    * Milestone   Additional Notes   Date of care: 9/5/2021      IP- LOC-ICU      ICU PN: Plan:   Neuro: Versed, fentanyl, precedex, propofol; oxy/valium enterally; Has had difficulty interacting with vent, and this morning is desatting into 60s-70s, so will restart Nimbex.   -plan SAT once FiO2 is 0.50   Pulm:    - Intubated on 8/25. On aggressive vent settings (PC 24/14, fiO2 100%) and continues to desat. - Shalini off   - Restarting Nimbex as above   - Continue methylpred taper   - Continue Bumex and chlorthalidone   - DVT scan NEG & has received Toci   Cardiac: Remains HDS   Renal: Hyperkalemia - resolved. Creatinine improving, good urine output. Appreciate Neph   GI: Nepro TFs given prior hyperkalemia. Senna, colace. ID: Antibiotics off. Procal nL. Heme: Lovenox   Endo: Cont methylpred taper   MSK:  ROM       F - Feeding:  Yes    A - Analgesia: Fentanyl   S - Sedation: Propofol, Precedex and Versed   T - DVT Prophylaxis: SCD's or Sequential Compression Device and Lovenox    C - Code Status: Full Code   H - Head of Bed: > 30 Degrees   U - Ulcer Prophylaxis: Pepcid (famotidine)    G - Glycemic Control: Insulin   S - Spontaneous Breathing Trial: No   B - Bowel Regimen: Senna   I - Indwelling Catheter:               Tubes: ETT and Orogastric Tube   Lines: Peripheral IV and Central Line   Drains: Brownlee Catheter   D - De-escalation of Antibiotics: cefepime       Subjective:   Overnight Events:   9/5: Nimbex restarted   General: Sedated intubated on mechanical ventilation   Eyes: Sclera anicteric. Pupils equally round and reactive to light. Mouth/Throat: Mucous membranes normal, oral pharynx clear, ET tube, OG tube   Neck: Supple   Lungs:   Coarse, distant   CV: Regular rate and rhythm   Abdomen:   Soft, non-tender.  bowel sounds normal. non-distended   Lines/Devices: Intact, no erythema, drainage or tenderness   Psych: Deeply sedated, withdraw to painful stimuli      Nephrology PN: Assessment and Plan    NABEEL :   - presumed to be 2/2 COVID illness.   - Cr continues to improve    - reduced  Bumex to 1 mg daily  And continue Chlorthalidone    - daily labs       Lytes:   -Stable       COVID 19 PNA    Acute resp failure: on vent (8/25)   - Off Shalini    - on Cefepime, steroids     - received Tocilizumab and remdesivir    - per [facility]        Vitals: Temp 103.3, , 160, 152, BP 74/41, 88/57, 74/31, RR 31, 30, 30, 89% on vent/ETT, 83% on vent/ETT, 88% on vent/ETT      Labs:   9/5/2021 03:48   WBC: 12.9 (H)   NRBC: 0.0   RBC: 4.00 (L)   HGB: 11.7 (L)   HCT: 36.2 (L)   RDW: 14.4   PLATELET: 251 (L)   ABSOLUTE NRBC: 0.00   Sodium: 138   Potassium: 4.4   Chloride: 102   CO2: 29   Anion gap: 7   Glucose: 212 (H)   BUN: 73 (H)   Creatinine: 1.85 (H)   BUN/Creatinine ratio: 39 (H)   Calcium: 8.8   Phosphorus: 3.8   Magnesium: 2.2   GFR est non-AA: 39 (L)   GFR est AA: 48 (L)   Bilirubin, total: 0.5   Protein, total: 6.4   Albumin: 2.3 (L)   Globulin: 4.1 (H)   A-G Ratio: 0.6 (L)   ALT: 58   AST: 23   Alk. phosphatase: 88   Lactic acid: 2.5 (HH)   Triglyceride: 251 (H)   9/5/2021 05:27   pH (POC): 7.41   pCO2 (POC): 40.8   pO2 (POC): 76 (L)   HCO3 (POC): 25.9   sO2 (POC): 95.1   Chest x-ray: IMPRESSION   1. New small to moderate left pneumothorax. 2. Bilateral airspace disease has mildly worsened since prior exam.    Chest x-ray: IMPRESSION   Interval left chest tube patient with similar appearance of left   pneumothorax. Chest x-ray: IMPRESSION   1. Interval placement of a second left apical chest tube, with only a trace   residual left pneumothorax remaining. 2. Unchanged extensive bilateral airspace disease, left lung worse than right.    Chest x-ray: IMPRESSION   Little interval change      Medications:    Rocuronium 69.8mg IV x1   Acetaminophen 650mg PO q6 PRN x1   Hygroton 25mg PO daily   Nimbex IV titrate Precedex IV titrate   Diazepam 20mg PO q8   Colace 100mg NG tube daily   Pepcid 20mg IV q12   Fentanyl IV titrate   Insulin lispro SC q6 SSI   Insulin NPH 5units SC q12   Solu-medrol 20mg IV q8   Versed IV titrate   Levophed IV titrate   Oxycodone IR 20mg PO q8   Propofol IV titrate   Senna 17.2mg PO daily   Sodium bicarbonate 150mEq/1000mL 100mL/hr IV   Vasopressin IV titrate      Plan: chest tube care/suction, daily CMP, daily CBC, FMS, daily lactic acid, adult tube feedings, daily renal function panel, mechanical ventilator, AVANI therapy, prone daily, suctioning, ABG daily, ETT care, POC glucose QID ACHS, daily phosphorus, daily magnesium, daily d dimer, strict I&Os, SCDs, aspiration precautions, droplet plus isolation            Viral Illness, Acute - Care Day 22 (9/4/2021) by Darshana Lloyd RN       Review Status Review Entered   Completed 9/6/2021 13:56      Criteria Review      Care Day: 22 Care Date: 9/4/2021 Level of Care: Intermediate Care    Guideline Day 2    Level Of Care    ( ) Floor    9/6/2021 13:56:18 EDT by Elfego JETT ICU    Clinical Status    (X) * Hypotension absent    9/6/2021 13:56:18 EDT by Rhianna Flanagan      /104    ( ) * No requirement for mechanical ventilation    9/6/2021 13:56:18 EDT by Rhianna Flanagan      VENT    ( ) * Oxygenation at baseline or improved    9/6/2021 13:56:18 EDT by Rhianna Flanagan      REMAINS ON THE VENT    ( ) * Mental status at baseline    9/6/2021 13:56:18 EDT by Rhianna Flanagan      SEDATED ON THE VENT    Routes    ( ) * Oral hydration    (X) Oral or IV medications    9/6/2021 13:56:18 EDT by Rhianna Flanagan      Pepcid 20mg iv q12  Fentanyl drip iv titrate  SSI x3  NPH insulin 5 units sc q12  Versed drip titrate iv  Diprivan drip titrate iv  Bumex 1mg iv q12  Hygroton 25mg po qday  Nimbex drip titrate iv  Precedex drip titrate iv  Valium 5mg po q8  Solu-medrol 20mg iv    Interventions    (X) Possible isolation    9/6/2021 13:56:18 EDT by Christain Mercer      DROPLET PLUS ISOLATION    (X) Pulse oximetry    9/6/2021 13:56:18 EDT by Rhianna Bennett      CONTINUOUS PULSE OX    (X) Possible oxygen    9/6/2021 13:56:18 EDT by Rhianna Bennett      VENT    Medications    (X) Possible DVT prophylaxis    9/6/2021 13:56:18 EDT by Christian Mercer      Lovenox 40mg sc q12    * Milestone   Additional Notes   9/4/2021   LOC IP ICU   VS  97.5 65 144/104 30 100% vent      LABS      WBC: 14.6 (H)   NRBC: 0.0   RBC: 3.89 (L)   HGB: 11.6 (L)   HCT: 35.7 (L)   MCV: 91.8   MCH: 29.8   MCHC: 32.5   RDW: 14.5   PLATELET: 758 (L)   MPV: 12.3   ABSOLUTE NRBC: 0.00   Sodium: 140   Potassium: 4.0   Chloride: 106   CO2: 26   Anion gap: 8   Glucose: 156 (H)   BUN: 22 (H)   Creatinine: 1.80 (H)   BUN/Creatinine ratio: 12   Calcium: 8.7   Phosphorus: 3.0   Magnesium: 2.2   GFR est non-AA: 41 (L)   GFR est AA: 49 (L)   Bilirubin, total: 0.5   Protein, total: 6.2 (L)   Albumin: 2.2 (L)   Globulin: 4.0   A-G Ratio: 0.6 (L)   ALT: 60   AST: 20   Alk. phosphatase: 81   Lactic acid: 2.5 (HH)   Procalcitonin: 0.09   ABG    pH (POC): 7.44   pCO2 (POC): 37.9   pO2 (POC): 115 (H)   HCO3 (POC): 25.9   sO2 (POC): 98.7 (H)   Base excess (POC): 1.8   FIO2 (POC): 100   Specimen type (POC): ARTERIAL   Set Rate: 30   Site: RIGHT RADIAL   Device[de-identified] ADULT VENT      MEDS   Solu-medrol 20mg iv q6   Roxicodone 15mg po q8   Roxicodone 20mg po q8 x2      ATTENDING NOTE   Plan:       Neuro: Versed, fentanyl, precedex, propofol; oxy/valium enterally; plan SAT once FiO2 is 0.50   -Wean Versed by 1 mg/hr   Pulm:    - Mechanical ventilation, goal is for protective ventilatory strategy.  Tidal volume of 6 mL/kg of ideal body weight   - Shalini off   - Nimbex off   - Continue methylpred taper   - Diuresis as needed   - DVT scan NEG & has received Toci   - Wean FiO2 for SpO2 > 90%   Cardiac: Remains HDS   Renal: Hyperkalemia - resolved.  Creatinine plateaued, good urine output. Appreciate Neph   GI: Nepro TFs given prior hyperkalemia. Senna, colace. Triglycerides better as of 3/31   ID: Antibiotics off. Procal nL. Heme: Lovenox   Endo: Cont methylpred taper   MSK:  ROM       F - Feeding:  Yes    A - Analgesia: Fentanyl   S - Sedation: Propofol, Precedex and Versed   T - DVT Prophylaxis: SCD's or Sequential Compression Device and Lovenox    C - Code Status: Full Code   H - Head of Bed: > 30 Degrees   U - Ulcer Prophylaxis: Pepcid (famotidine)    G - Glycemic Control: Insulin   S - Spontaneous Breathing Trial: No   B - Bowel Regimen: Senna   I - Indwelling Catheter:               Tubes: ETT and Orogastric Tube   Lines: Peripheral IV and Central Line   Drains: Browlnee Catheter   D - De-escalation of Antibiotics: cefepime       Subjective:   Overnight Events:    9/4: Sedated, FiO2 at 1.0   Ventilator Settings:   Mode Rate Tidal Volume Pressure FiO2 PEEP   Assist control, Pressure control 400 ml 100 % 16 cm H20       Peak airway pressure: 45 cm H2O    Minute ventilation: 17.2 l/min    Physical Exam:   General: Sedated intubated on mechanical ventilation   Eyes: Sclera anicteric. Pupils equally round and reactive to light. Mouth/Throat: Mucous membranes normal, oral pharynx clear, ET tube, OG tube   Neck: Supple   Lungs:   Coarse crepitation bilaterally on auscultation   CV: Regular rate and rhythm   Abdomen:   Soft, non-tender.  bowel sounds normal. non-distended   Lines/Devices: Intact, no erythema, drainage or tenderness   Psych: Deeply sedated, withdraw to painful stimuli         NEPHROLOGY NOTE   CC:  Follow up for NABEEL       Assessment and Plan    NABEEL :   - presumed to be 2/2 COVID illness.   -Cr continues to improve    - continue Bumex and Chlorthalidone    - daily labs       Lytes:   -Stable       COVID 19 PNA    Acute resp failure: on vent (8/25)   - Off Shalini    - on Cefepime, steroids     - received Tocilizumab and remdesivir    - per [facility]                Interval History:   Good UOP, CR down, stable on Vent.  Off Nimbex , remains on 100% FIO2         Viral Illness, Acute - Care Day 21 (9/3/2021) by Benson Lee RN       Review Status Review Entered   Completed 9/6/2021 13:50      Criteria Review      Care Day: 21 Care Date: 9/3/2021 Level of Care: Intermediate Care    Guideline Day 2    Level Of Care    ( ) Floor    9/6/2021 13:50:44 EDT by Marlo Lombardo      IP ICU    Clinical Status    (X) * Hypotension absent    9/6/2021 13:50:44 EDT by Marlo Lombardo      bp 153/96    ( ) * No requirement for mechanical ventilation    9/6/2021 13:50:44 EDT by Marlo Lombardo      remains on the vent    ( ) * Oxygenation at baseline or improved    9/6/2021 13:50:44 EDT by Rhianna Bliss    ( ) * Mental status at baseline    9/6/2021 13:50:44 EDT by Marlo Lombardo      sedated on the vent    Routes    ( ) * Oral hydration    9/6/2021 13:50:44 EDT by Marlo Lombardo      tube feeding    (X) Oral or IV medications    Interventions    (X) Possible isolation    9/6/2021 13:50:44 EDT by Rhianna Bliss      droplet plus isolation    (X) Pulse oximetry    9/6/2021 13:50:44 EDT by Marlo Lombardo      icu monitoring    (X) Possible oxygen    9/6/2021 13:50:44 EDT by Rhianna Bliss      vent 99%    Medications    (X) Possible DVT prophylaxis    9/6/2021 13:50:44 EDT by Rhianna Bliss      Lovenox 40mg sc q12    * Milestone   Additional Notes   9/3/2021   LOC IP ICU   VS  98.3 58 153/96 30 99% VENT      LABS     WBC: 15.9 (H)   NRBC: 0.0   RBC: 3.84 (L)   HGB: 11.5 (L)   HCT: 35.3 (L)   MCV: 91.9   MCH: 29.9   MCHC: 32.6   RDW: 14.6 (H)   PLATELET: 556 (L)   MPV: 12.1   ABSOLUTE NRBC: 0.00   Sodium: 142   Potassium: 4.6   Chloride: 111 (H)   CO2: 26   Anion gap: 5   Glucose: 188 (H)   BUN: 84 (H)   Creatinine: 1.89 (H)   BUN/Creatinine ratio: 44 (H)   Calcium: 8.6   Phosphorus: 3.5   Magnesium: 2.5 (H)   GFR est non-AA: 38 (L)   GFR est AA: 47 (L)   Bilirubin, total: 0.5   Protein, total: 6.1 (L)   Albumin: 2.3 (L)   Globulin: 3.8   A-G Ratio: 0.6 (L)   ALT: 67   AST: 18   Alk. phosphatase: 77   Lactic acid: 2.6 (HH)   Procalcitonin: 0.14   ABG    pH: 7.43   PCO2: 37   PO2: 129 (H)   BICARBONATE: 24   O2 SAT: 99 (H)   BASE EXCESS: 0   Sample source: ARTERIAL   SITE: RIGHT RADIAL   RANDALL'S TEST: YES      MEDS   Lovenox 40mg sc q12   Pepcid 20mg iv q12   Fentanyl drip iv titrate   SSI x4   NPH insulin 5 units sc q12   Versed drip titrate iv   Bumex 1mg iv q12   Hygroton 25mg po qday   Nimbex drip titrate iv   Precedex drip titrate iv   Valium 5mg po q8   Solu-medrol 20mg iv q6   Roxicodone 15mg po q8      ATTENDING NOTE   Plan:       Neuro: versed, fentanyl, precedex, propofol; oxy/valium enterally; plan SAT once FiO2 is 0.50   Pulm:    - Mechanical ventilation, goal is for protective ventilatory strategy.  Tidal volume of 6 to 8mL/kg of ideal body weight [450cc goal]. - ABGs improving.    - Shalini off   - Nimbex - attempt to wean off today   - Continue methylpred taper   - Diuresis as needed   - DVT scan NEG & has received Toci   - CXR tomorrow   - Wean FiO2 for SpO2 > 90%   Cardiac: Remains HDS   Renal: Hyperkalemia - resolved. Creatinine plateaued, good urine output. Appreciate Neph   GI: Nepro TFs given prior hyperkalemia. Senna, colace. Triglycerides better as of 3/31   ID: Antibiotics off. Procal nL.     Heme: Lovenox   Endo: Cont methylpred taper   MSK:  ROM       F - Feeding:  Yes    A - Analgesia: Fentanyl   S - Sedation: Propofol, Precedex and Versed   T - DVT Prophylaxis: SCD's or Sequential Compression Device and Lovenox    C - Code Status: Full Code   H - Head of Bed: > 30 Degrees   U - Ulcer Prophylaxis: Pepcid (famotidine)    G - Glycemic Control: Insulin   S - Spontaneous Breathing Trial: No   B - Bowel Regimen: Senna   I - Indwelling Catheter:               Tubes: ETT and Orogastric Tube   Lines: Peripheral IV and Central Line   Drains: Brownlee Catheter   D - De-escalation of Antibiotics: cefepime       Subjective:   Overnight Events:    9/3: reparalyzed, FiO2 at 1.0   Ventilator Settings:   Mode Rate Tidal Volume Pressure FiO2 PEEP   Assist control, Pressure control 400 ml 100 % 16 cm H20       Peak airway pressure: 45 cm H2O    Minute ventilation: 17.2 l/min    Physical Exam:   General: Sedated intubated on mechanical ventilation   Eyes: Sclera anicteric. Pupils equally round and reactive to light. Mouth/Throat: Mucous membranes normal, oral pharynx clear, ET tube, OG tube   Neck: Supple   Lungs:   Coarse crepitation bilaterally on auscultation   CV: Regular rate and rhythm   Abdomen:   Soft, non-tender.  bowel sounds normal. non-distended   Lines/Devices: Intact, no erythema, drainage or tenderness   Psych: Deeply sedated, withdraw to painful stimuli          NEPHROLOGY NOTE   CC:  Follow up for NABEEL       Assessment and Plan    NABEEL :   - presumed to be 2/2 COVID illness.   -Creatinine trending down and has good urine output.   -Chest x-ray is improving.   -Given his 100% FiO2 requirement, trial of Bumex to increase diuresis.  Continue with chlorthalidone   - cont present care   - daily labs       Lytes:   -Stable       COVID 19 PNA    Acute resp failure: on vent (8/25)   - Off Shalini    - on Cefepime, steroids     - received Tocilizumab and remdesivir    - per [facility] [place]                Interval History:   Examined outside room.  Stable Cr and UOP.  Stable night per RN.  Remains sedated on the vent             Viral Illness, Acute - Care Day 20 (9/2/2021) by Kat Esposito RN       Review Status Review Entered   Completed 9/6/2021 13:44      Criteria Review      Care Day: 20 Care Date: 9/2/2021 Level of Care: Intermediate Care    Guideline Day 2    Level Of Care    ( ) Floor    9/6/2021 13:44:09 EDT by Mick rivas icu    Clinical Status    (X) * Hypotension absent    9/6/2021 13:44:09 EDT by Tolu Mathews Rhianna      /95    ( ) * No requirement for mechanical ventilation    9/6/2021 13:44:09 EDT by Awilda Cho      REMAINS SEDATED ON THE VENT    ( ) * Oxygenation at baseline or improved    ( ) * Mental status at baseline    9/6/2021 13:44:09 EDT by Awilda Cho      SEDATE ON THE VENT    Routes    ( ) * Oral hydration    9/6/2021 13:44:09 EDT by Rhianna Goldman      TUBE FEEDING    (X) Oral or IV medications    Interventions    (X) Possible isolation    9/6/2021 13:44:09 EDT by Rhianna Goldman      DROPLET PLUS ISOLATION    (X) Pulse oximetry    9/6/2021 13:44:09 EDT by Rhianna Goldman      CONTINUOUS PULSE OX    (X) Possible oxygen    9/6/2021 13:44:09 EDT by Rhianna Goldman      VENT    Medications    (X) Possible DVT prophylaxis    9/6/2021 13:44:09 EDT by Awilda Cho      Lovenox 40mg sc q12    * Milestone   Additional Notes   9/2/2021   LOC IP ICU   VS  97.7 62 139/95 30 96% VENT   LABS     WBC: 16.2 (H)   NRBC: 0.0   RBC: 3.86 (L)   HGB: 11.5 (L)   HCT: 35.9 (L)   MCV: 93.0   MCH: 29.8   MCHC: 32.0   RDW: 14.6 (H)   PLATELET: 837 (L)   MPV: 11.9   ABSOLUTE NRBC: 0.00   Sodium: 144   Potassium: 5.0   Chloride: 112 (H)   CO2: 27   Anion gap: 5   Glucose: 178 (H)   BUN: 83 (H)   Creatinine: 1.94 (H)   BUN/Creatinine ratio: 43 (H)   Calcium: 8.6   Phosphorus: 3.9   Magnesium: 2.5 (H)   GFR est non-AA: 37 (L)   GFR est AA: 45 (L)   Bilirubin, total: 0.5   Protein, total: 5.9 (L)   Albumin: 2.3 (L)   Globulin: 3.6   A-G Ratio: 0.6 (L)   ALT: 74   AST: 21   Alk. phosphatase: 78   Lactic acid: 2.2 (HH)   Procalcitonin: 0.17      CXR IMPRESSION       Stable bilateral lung opacities.       MEDS   Lovenox 40mg sc q12   Pepcid 20mg iv q12   Fentanyl drip iv titrate   SSI x3   NPH insulin 5 units sc q12   Versed drip titrate iv   Diprivan drip titrate iv   Senokot 17.2mg po qday   Rocuronium 50mg iv x2   Nimbex drip titrate iv   Hygroton 25mg po qday   Precedex drip titrate iv   Solumedrol 20mg iv q6   Roxicodone 15mg po q8      ATTENDING NOTE   Plan:       Neuro: versed, fentanyl, precedex, propofol; oxy/valium enterally; plan SAT once FiO2 is 0.50   Pulm:    - Mechanical ventilation, goal is for protective ventilatory strategy.  Tidal volume of 6 to 8mL/kg of ideal body weight [450cc goal]. - ABGs improving.    - Shalini off   - Nimbex resumed   - Continue methylpred taper   - Diuresis as needed   - DVT scan NEG & has received Toci   - CXR tomorrow   - Wean FiO2 for SpO2 > 90%   Cardiac: Remains HDS   Renal: Hyperkalemia - resolved. Creatinine plateaued, good urine output. Appreciate Neph   GI: Nepro TFs given prior hyperkalemia. Senna, colace. Triglycerides better as of 3/31   ID: Antibiotics off. Procal nL. Heme: Lovenox   Endo: Cont methylpred taper   MSK:  ROM       F - Feeding:  Yes    A - Analgesia: Fentanyl   S - Sedation: Propofol, Precedex and Versed   T - DVT Prophylaxis: SCD's or Sequential Compression Device and Lovenox    C - Code Status: Full Code   H - Head of Bed: > 30 Degrees   U - Ulcer Prophylaxis: Pepcid (famotidine)    G - Glycemic Control: Insulin   S - Spontaneous Breathing Trial: No   B - Bowel Regimen: Senna   I - Indwelling Catheter:               Tubes: ETT and Orogastric Tube   Lines: Peripheral IV and Central Line   Drains: Brownlee Catheter   D - De-escalation of Antibiotics: cefepime       Subjective:   Overnight Events:    9/2: oxygenation improving      Ventilator Settings:   Mode Rate Tidal Volume Pressure FiO2 PEEP   Assist control, Pressure control 400 ml 100 % 16 cm H20       Peak airway pressure: 45 cm H2O    Minute ventilation: 16.3 l/min     Physical Exam:   General: Sedated intubated on mechanical ventilation   Eyes: Sclera anicteric. Pupils equally round and reactive to light.    Mouth/Throat: Mucous membranes normal, oral pharynx clear, ET tube, OG tube   Neck: Supple   Lungs:   Coarse crepitation bilaterally on auscultation   CV: Regular rate and rhythm   Abdomen:   Soft, non-tender. bowel sounds normal. non-distended   Lines/Devices: Intact, no erythema, drainage or tenderness   Psych: Deeply sedated, withdraw to painful stimuli         NEPHROLOGY NOTE   CC:  Follow up for NABEEL       Assessment and Plan    NABEEL :   - presumed to be 2/2 COVID illness.   - Cr stable and has good UOP.     - cont present care   - daily labs       Hyperkalemia :   - resolved and K  stable        COVID 19 PNA    Acute resp failure: on vent (8/25)   - Off Shalini    - on Cefepime, steroids     - received Tocilizumab and remdesivir    - per [facility]                Interval History:   Examined outside room.  Stable Cr and UOP.  Stable night per RN.  Remains sedated on the vent         Viral Illness, Acute - Care Day 19 (9/1/2021) by Florence Ha RN       Review Status Review Entered   Completed 9/2/2021 11:28      Criteria Review      Care Day: 19 Care Date: 9/1/2021 Level of Care: Intermediate Care    Guideline Day 2    Level Of Care    ( ) Floor    9/2/2021 11:28:53 EDT by Chastity Vela       ICU    Clinical Status    (X) * Hypotension absent    9/2/2021 11:28:53 EDT by Rhianna Chester       93 131/77 30 97% VENT    ( ) * No requirement for mechanical ventilation    9/2/2021 11:28:53 EDT by Rhianna Samuel      VENT    ( ) * Oxygenation at baseline or improved    9/2/2021 11:28:53 EDT by Rhianna Chester      97% ON THE VENT    ( ) * Mental status at baseline    9/2/2021 11:28:53 EDT by Rhianna Chester      SEDATED ON FENTANYL,VERSED, DIPRIVAN DRIP    Routes    ( ) * Oral hydration    9/2/2021 11:28:53 EDT by Rhianna Chester      TUBE FEEDING RENAL    (X) Oral or IV medications    9/2/2021 11:28:53 EDT by Rhianna Chester      SEE REVIEW    Interventions    (X) Possible isolation    9/2/2021 11:28:53 EDT by Rhianna Chester      DROPLET PLUS ISOLATION    (X) Pulse oximetry    9/2/2021 11:28:53 EDT by Jv Coreas Rhianna      CONTINUOUS PULSE OX    (X) Possible oxygen    9/2/2021 11:28:53 EDT by Rhianna Thompson      VENT    Medications    (X) Possible DVT prophylaxis    9/2/2021 11:28:53 EDT by Melba Montalvo      Lovenox 40mg sc q12    * Milestone   Additional Notes   9/1/2021   LOC IP ICU    93 131/77 30 97% VENT   LABS WBC 17.2   Chloride: 111 (H)   CO2: 25   Anion gap: 6   Glucose: 198 (H)   BUN: 88 (H)   Creatinine: 2.03 (H)   BUN/Creatinine ratio: 43 (H)   Calcium: 8.8   Phosphorus: 4.0   Magnesium: 2.5 (H)   GFR est non-AA: 35 (L)   GFR est AA: 43 (L)   Bilirubin, total: 0.5   Protein, total: 5.6 (L)   Albumin: 2.4 (L)   Globulin: 3.2   A-G Ratio: 0.8 (L)   ALT: 77   AST: 28   Alk. phosphatase: 80   Lactic acid: 2.5 (HH)   Procalcitonin: 0.14   pH (POC): 7.37   pCO2 (POC): 42.4   pO2 (POC): 108 (H)   HCO3 (POC): 24.4   sO2 (POC): 98.0 (H)   Base deficit (POC): 1.0   FIO2 (POC): 100   Specimen type (POC): ARTERIAL   Set Rate: 30   Site: RIGHT RADIAL   Device[de-identified] ADULT VENT   Mode: PRESSURE CONTROL      MEDS    hygroton 25mg po qday   Precedex drip titrate iv   Valium 5mg po q8   Colace 100mg qday per ng tube   Fentanyl drip titrate iv   Versed drip titrate iv   Diprivan drip titrate iv   Pepcid 20mg iv q12   Roxicodone 15mg po q8   SSI x3   NOVOLIN INSULIN 5 UNITS SC Q12   Solu-medrol 20mg iv q6         ATTENDING NOTE       Plan:       Neuro: versed, fentanyl, precedex, propofol; add oxy/valium enterally   Pulm:    - Mechanical ventilation, goal is for protective ventilatory strategy.  Tidal volume of 6 to 8mL/kg of ideal body weight [450cc goal]. - ABGs improving.    - Shalini off   - Nimbex off   - Continue methylpred taper   - Diuresis as needed   - DVT scan NEG & has received Toci   - CXR tomorrow   Cardiac: Remains HDS   Renal: Hyperkalemia - resolved. Creatinine plateaued, good urine output. Appreciate Neph   GI: Nepro TFs given prior hyperkalemia. Senna, colace.  Triglycerides better as of 3/31   ID: Antibiotics off. Procal nL. Heme: Lovenox   Endo: Cont methylpred taper   MSK:  Nimbex - wean today       F - Feeding:  Yes    A - Analgesia: Fentanyl   S - Sedation: Propofol, Precedex and Versed   T - DVT Prophylaxis: SCD's or Sequential Compression Device and Lovenox    C - Code Status: Full Code   H - Head of Bed: > 30 Degrees   U - Ulcer Prophylaxis: Pepcid (famotidine)    G - Glycemic Control: Insulin   S - Spontaneous Breathing Trial: No   B - Bowel Regimen: Senna   I - Indwelling Catheter:               Tubes: ETT and Orogastric Tube   Lines: Peripheral IV and Central Line   Drains: Brownlee Catheter   D - De-escalation of Antibiotics: cefepime       Subjective:   Overnight Events:    9/1: off paralytic   Ventilator Settings:   Mode Rate Tidal Volume Pressure FiO2 PEEP   Pressure control 400 ml 100 % 16 cm H20       Peak airway pressure: 45 cm H2O    Minute ventilation: 13 l/min     Physical Exam:   General: Sedated intubated on mechanical ventilation   Eyes: Sclera anicteric. Pupils equally round and reactive to light. Mouth/Throat: Mucous membranes normal, oral pharynx clear, ET tube, OG tube   Neck: Supple   Lungs:   Coarse crepitation bilaterally on auscultation   CV: Regular rate and rhythm   Abdomen:   Soft, non-tender. bowel sounds normal. non-distended   Lines/Devices: Intact, no erythema, drainage or tenderness   Psych: Deeply sedated, withdraw to painful stimuli         NEPHROLOGY NOTE   CC:  Follow up for NABEEL       Assessment and Plan    NABEEL :   - presumed to be 2/2 COVID illness.   - Cr stable and has good UOP. - continue chlorthalidone    - avoid hypotension and all potential nephrotoxins    - labs daily        Hyperkalemia :   - resolved and K  stable        COVID 19 PNA    Acute resp failure: on vent (8/25)   - Off Shalini    - on Cefepime, steroids     - received Tocilizumab and remdesivir    - per CCM                Interval History:   Seen from outside the room through glass dors. Stable from renal stand point. Good UOP.     Stable off Avani   WBC increased    Afebrile              Viral Illness, Acute - Care Day 18 (8/31/2021) by Adry Ghosh RN       Review Status Review Entered   Completed 9/2/2021 11:20      Criteria Review      Care Day: 18 Care Date: 8/31/2021 Level of Care: Intermediate Care    Guideline Day 2    Level Of Care    ( ) Floor    9/2/2021 11:20:43 EDT by Marla Melgoza      IP ICU    Clinical Status    (X) * Hypotension absent    9/2/2021 11:20:43 EDT by Rhianna Hussein      VS 98.4 63 142/83 30 98% vent    ( ) * No requirement for mechanical ventilation    9/2/2021 11:20:43 EDT by Rhianna Hussein      REMAINS ON VENT AVANI WEANED OFF    ( ) * Oxygenation at baseline or improved    9/2/2021 11:20:43 EDT by Rhianna Hussein      REMAINS ON VENT    ( ) * Mental status at baseline    9/2/2021 11:20:43 EDT by Marla Melgoza      Psych: Deeply sedated, withdraw to painful stimuli    Activity    ( ) Advance activity as tolerated    9/2/2021 11:20:43 EDT by Rhianna Hussein      SEDATED ON VENT    Routes    ( ) * Oral hydration    9/2/2021 11:20:43 EDT by Rhianna Hussein      TUBE FEEDING RENAL    (X) Oral or IV medications    Interventions    (X) Possible isolation    9/2/2021 11:20:43 EDT by Rhianna Hussein      DROPLET PLUS    (X) Pulse oximetry    9/2/2021 11:20:43 EDT by Rhianna Hussein      CONTINUOUS    (X) Possible oxygen    9/2/2021 11:20:43 EDT by Rhianna Hussein      VENT    Medications    (X) Possible DVT prophylaxis    9/2/2021 11:20:43 EDT by Rhianna Hussein      Lovenox 40mg sc q12    * Milestone   Additional Notes   8/31/2021   LOC IP ICU   VS 98.4 63 142/83 30 98% vent   LABS WBC 15.4   Chloride: 111 (H)   CO2: 25   Anion gap: 6   Glucose: 189 (H)   BUN: 89 (H)   Creatinine: 2.16 (H)   BUN/Creatinine ratio: 41 (H)   Calcium: 8.4 (L)   Phosphorus: 3.2   Magnesium: 2.8 (H)   GFR est non-AA: 33 (L) GFR est AA: 40 (L)   Bilirubin, total: 0.5   Protein, total: 5.7 (L)   Albumin: 2.4 (L)   Globulin: 3.3   A-G Ratio: 0.7 (L)   ALT: 75   AST: 23   Alk. phosphatase: 70   Lactic acid: 2.7 (HH)   pH: 7.44   PCO2: 33 (L)   PO2: 103 (H)   BICARBONATE: 22   O2 SAT: 98 (H)   BASE DEFICIT: 1.5      MEDS    hygroton 25mg po qday   Nimbex drip titrate iv   Precedex drip titrate iv   Colace 100mg qday per ng tube   Lovenox 40mg sc q12   Fentanyl drip titrate iv   Versed drip titrate iv   Diprivan drip titrate iv   Pepcid 20mg iv q12   SSI x3   NOVOLIN INSULIN 5 UNITS SC Q12   Solu-medrol 20mg iv q6         ATTENDING NOTE   Plan:       Neuro: versed, fentanyl, precedex, propofol, nimbex   Pulm:    - Mechanical ventilation, goal is for protective ventilatory strategy.  Tidal volume of 6 to 8mL/kg of ideal body weight [450cc goal]. Continue PSV   - ABGs improving.    - Shalini off   - Continue methylpred taper   - Diuresis as needed   - DVT scan NEG & has received Toci   - CXR tomorrow   Cardiac: Remains HDS   Renal: Hyperkalemia - resolved. Creatinine plateaued, good urine output. GI: Nepro TFs given prior hyperkalemia. Senna, colace. Triglycerides better as of 3/31   ID: Antibiotics off. Procal nL. Heme: Lovenox   Endo: Cont methylpred taper   MSK:  Nimbex - wean today       F - Feeding:  Yes    A - Analgesia: Fentanyl   S - Sedation: Propofol, Precedex and Versed   T - DVT Prophylaxis: SCD's or Sequential Compression Device and Lovenox    C - Code Status: Full Code   H - Head of Bed: > 30 Degrees   U - Ulcer Prophylaxis: Pepcid (famotidine)    G - Glycemic Control: Insulin   S - Spontaneous Breathing Trial: No   B - Bowel Regimen: Senna   I - Indwelling Catheter:               Tubes: ETT and Orogastric Tube   Lines: Peripheral IV, Arterial Line and Central Line   Drains: Brownlee Catheter   D - De-escalation of Antibiotics: cefepime       Subjective:   Overnight Events:    8/31/2021 - Shalini weaned off. HDS.                 Ventilator Settings:   Mode Rate Tidal Volume Pressure FiO2 PEEP   Assist control, Pressure control 400 ml 100 % 16 cm H20       Peak airway pressure: 45 cm H2O    Minute ventilation: 17.7 l/min    Physical Exam:   General: Sedated intubated on mechanical ventilation   Eyes: Sclera anicteric. Pupils equally round and reactive to light. Mouth/Throat: Mucous membranes normal, oral pharynx clear, ET tube, OG tube   Neck: Supple   Lungs:   Coarse crepitation bilaterally on auscultation   CV: Regular rate and rhythm   Abdomen:   Soft, non-tender. bowel sounds normal. non-distended   Lines/Devices: Intact, no erythema, drainage or tenderness   Psych: Deeply sedated, withdraw to painful stimuli         NEPHROLOGY NOTE   Assessment and Plan    NABEEL :   - presumed to be 2/2 COVID illness.   - Cr stable and has good UOP. - continue chlorthalidone and may stop tomorrow    - avoid hypotension and all potential nephrotoxins    - labs daily        Hyperkalemia :   - resolved        COVID 19 PNA    Acute resp failure: on vent (8/25)   - On Shalini    - on Cefepime    - received Tocilizumab and remdesivir    - per [facility]                Interval History:   Seen from outside the room through glass dors. Discussed w/RN    Off Shalini. Paralyzed and oxygenating well. Cr stable.  3+L UOP
